# Patient Record
Sex: FEMALE | Race: WHITE | Employment: PART TIME | URBAN - METROPOLITAN AREA
[De-identification: names, ages, dates, MRNs, and addresses within clinical notes are randomized per-mention and may not be internally consistent; named-entity substitution may affect disease eponyms.]

---

## 2017-02-07 ENCOUNTER — OFFICE VISIT (OUTPATIENT)
Dept: OBGYN | Facility: CLINIC | Age: 52
End: 2017-02-07
Payer: COMMERCIAL

## 2017-02-07 VITALS
HEART RATE: 77 BPM | SYSTOLIC BLOOD PRESSURE: 140 MMHG | OXYGEN SATURATION: 98 % | WEIGHT: 158.2 LBS | BODY MASS INDEX: 23.98 KG/M2 | HEIGHT: 68 IN | DIASTOLIC BLOOD PRESSURE: 80 MMHG | TEMPERATURE: 98.3 F

## 2017-02-07 DIAGNOSIS — R87.810 CERVICAL HIGH RISK HPV (HUMAN PAPILLOMAVIRUS) TEST POSITIVE: Primary | ICD-10-CM

## 2017-02-07 DIAGNOSIS — Z98.890 STATUS POST COLPOSCOPY: ICD-10-CM

## 2017-02-07 PROCEDURE — 57454 BX/CURETT OF CERVIX W/SCOPE: CPT | Performed by: FAMILY MEDICINE

## 2017-02-07 PROCEDURE — 88305 TISSUE EXAM BY PATHOLOGIST: CPT | Performed by: FAMILY MEDICINE

## 2017-02-07 NOTE — NURSING NOTE
"Chief Complaint   Patient presents with     Colposcopy       Initial /90 mmHg  Pulse 77  Temp(Src) 98.3  F (36.8  C) (Oral)  Ht 5' 7.5\" (1.715 m)  Wt 158 lb 3.2 oz (71.759 kg)  BMI 24.40 kg/m2  SpO2 98%  LMP 05/01/2015 (Approximate)  Breastfeeding? No Estimated body mass index is 24.4 kg/(m^2) as calculated from the following:    Height as of this encounter: 5' 7.5\" (1.715 m).    Weight as of this encounter: 158 lb 3.2 oz (71.759 kg).  Medication Reconciliation: unable or not appropriate to perform   KRISTY Elizabeth      "

## 2017-02-07 NOTE — PROGRESS NOTES
Katharina Rodríguez is a 51 year old  woman who presents for colposcopy for other HR HPV.  She has a prior history of other HR HPV.    GYNECOLOGIC HISTORY   Menarche: 14  Contraception Use: none  Pap: NIL    This document serves as a record of the services and decisions personally performed and made by Samantha Medrano DO. It was created on his/her behalf by Nisa Azul, a trained medical scribe. The creation of this document is based the provider's statements to the medical scribe.  Scribe Nisa Azul 2:52 PM, 2017      Risks and benefits were explained in detail prior to procedure including bleeding, infection and possible need for further treatment.  Informed consent was obtained to proceed.    Procedure/Findings:  Patient was placed in dorsal lithotomy position. Speculum was inserted. Gross examination of the cervix revealed no path.  Acetic acid and Lugol's solution applied.    1. Colposcopy adequate:  Yes     2. Squamocolumnar junction visibility: completely visible    3. Normal colposcopic findings:      Original squamous epithelium:  mature    Other:  other: none    4. Abnormal colposcopic findings:  yes    Location of lesion:  inside transformation zone at 12, 4 and 9 o'clock    Size of lesion:  3 quadrants,  2% of cervix    Grade 1 (minor):  yes     Details:   fine mosaicism, fine punctation and thin acetowhite epithelium    Grade 2 (major):   no     Details:    none    Non-specific:         Leukoplakia: no        Erosion: no    Lugol's staining:  Non-stained    Suspicious for invasion: no     Details:  none    Miscellaneous findings: none    5.  3 biopsies were obtained at 12, 4, and 9 o'clock.     ECC was performed.     Hemostasis was achieved with monsels.        Procedure course: Patient tolerated procedure well  Assessment:  Normal exam without visible pathology, DEVYN 1, DEVYN 2 and DEVYN 3  Plan:   if no dysplasia, repeat pap in 6/12 months, if CIN1, recommend    Repeat pap in  6/12 months or HPV in 1 year and if CIN2-3     recommend LEEP, Cryo or Repeat pap/colposcopy in 6/12 months    The information in this document, created by the medical scribe for me, accurately reflects the services I personally performed and the decisions made by me. I have reviewed and approved this document for accuracy prior to leaving the patient care area.  Samantha Medrano DO  2:52 PM, 02/07/2017      Dr. Samantha Medrano, DO    OB/GYN   [facility]      2011 Colposcopic Terminology of the International Federation for Cervical Pathology and Colposcopy  Obstetrics and Gynecology, VOL. 120, NO. 1, JULY 2012

## 2017-02-07 NOTE — PATIENT INSTRUCTIONS
We will contact you with results.     Dr. Samantha Medrano, DO    Obstetrics and Gynecology  [facility] [place]

## 2017-02-09 LAB — COPATH REPORT: NORMAL

## 2017-06-13 ENCOUNTER — TELEPHONE (OUTPATIENT)
Dept: FAMILY MEDICINE | Facility: CLINIC | Age: 52
End: 2017-06-13

## 2017-06-13 DIAGNOSIS — N64.4 BREAST PAIN, LEFT: Primary | ICD-10-CM

## 2017-06-13 NOTE — TELEPHONE ENCOUNTER
Patient is calling for an order for a mammogram, she reports left breast pain as well.  This order is placed as a diagnostic mammogram, and patient is given phone number  To R radiology scheduling.   Marta Ramirez RN  Triage Nurse

## 2017-06-14 ENCOUNTER — HOSPITAL ENCOUNTER (OUTPATIENT)
Dept: ULTRASOUND IMAGING | Facility: CLINIC | Age: 52
End: 2017-06-14
Attending: INTERNAL MEDICINE
Payer: COMMERCIAL

## 2017-06-14 ENCOUNTER — HOSPITAL ENCOUNTER (OUTPATIENT)
Dept: MAMMOGRAPHY | Facility: CLINIC | Age: 52
Discharge: HOME | End: 2017-06-14
Attending: INTERNAL MEDICINE | Admitting: INTERNAL MEDICINE
Payer: COMMERCIAL

## 2017-06-14 DIAGNOSIS — N64.4 BREAST PAIN, LEFT: ICD-10-CM

## 2017-06-14 DIAGNOSIS — I10 ESSENTIAL HYPERTENSION WITH GOAL BLOOD PRESSURE LESS THAN 140/90: ICD-10-CM

## 2017-06-14 PROCEDURE — 76642 ULTRASOUND BREAST LIMITED: CPT | Mod: LT

## 2017-06-14 PROCEDURE — G0204 DX MAMMO INCL CAD BI: HCPCS

## 2017-06-15 NOTE — TELEPHONE ENCOUNTER
atenolol (TENORMIN) 25 MG      Last Written Prescription Date: 5/19/16  Last Fill Quantity: 90, # refills: 3    Last Office Visit with [facility], [facility] or [facility] prescribing provider:  11/9/16   Future Office Visit:        BP Readings from Last 3 Encounters:   02/07/17 140/80   11/09/16 128/70   09/12/16 118/72

## 2017-06-16 RX ORDER — ATENOLOL 25 MG/1
TABLET ORAL
Qty: 30 TABLET | Refills: 0 | Status: SHIPPED | OUTPATIENT
Start: 2017-06-16 | End: 2017-07-17

## 2017-06-16 NOTE — TELEPHONE ENCOUNTER
Medication is being filled for 1 time refill only due to:  Patient needs to be seen because it has been more than one year since last visit. LOV for this medication 5/19/2016    Esmer Harding RN

## 2017-07-17 DIAGNOSIS — I10 ESSENTIAL HYPERTENSION WITH GOAL BLOOD PRESSURE LESS THAN 140/90: ICD-10-CM

## 2017-07-18 NOTE — TELEPHONE ENCOUNTER
Routing refill request to provider for review/approval because:  Maria Fernanda given x1 and patient did not follow up, please advise    Giselle RODRIGUEZ RN, BSN, PHN  Lowry Flex RN

## 2017-07-19 RX ORDER — ATENOLOL 25 MG/1
25 TABLET ORAL DAILY
Qty: 14 TABLET | Refills: 0 | Status: SHIPPED | OUTPATIENT
Start: 2017-07-19 | End: 2017-08-03

## 2017-07-19 NOTE — TELEPHONE ENCOUNTER
Last addressed at 5/19/2016 appt. (OVER A YEAR AGO) and Maria Fernanda period meds provided without recommended follow up .  Please assist with scheduling pt.  Can provide #14 (2 weeks of medications) until appt.

## 2017-08-03 ENCOUNTER — OFFICE VISIT (OUTPATIENT)
Dept: FAMILY MEDICINE | Facility: CLINIC | Age: 52
End: 2017-08-03
Payer: COMMERCIAL

## 2017-08-03 VITALS
BODY MASS INDEX: 22.97 KG/M2 | OXYGEN SATURATION: 97 % | HEART RATE: 62 BPM | TEMPERATURE: 98 F | HEIGHT: 68 IN | WEIGHT: 151.6 LBS | DIASTOLIC BLOOD PRESSURE: 64 MMHG | RESPIRATION RATE: 16 BRPM | SYSTOLIC BLOOD PRESSURE: 128 MMHG

## 2017-08-03 DIAGNOSIS — I10 ESSENTIAL HYPERTENSION WITH GOAL BLOOD PRESSURE LESS THAN 140/90: ICD-10-CM

## 2017-08-03 DIAGNOSIS — Z11.59 ENCOUNTER FOR HCV SCREENING TEST FOR LOW RISK PATIENT: Primary | ICD-10-CM

## 2017-08-03 DIAGNOSIS — Z13.6 CARDIOVASCULAR SCREENING; LDL GOAL LESS THAN 130: ICD-10-CM

## 2017-08-03 PROCEDURE — 99214 OFFICE O/P EST MOD 30 MIN: CPT | Performed by: INTERNAL MEDICINE

## 2017-08-03 RX ORDER — ATENOLOL 25 MG/1
25 TABLET ORAL DAILY
Qty: 90 TABLET | Refills: 3 | Status: SHIPPED | OUTPATIENT
Start: 2017-08-03 | End: 2017-08-21

## 2017-08-03 NOTE — PROGRESS NOTES
SUBJECTIVE:                                                    Katharina Rodríguez is a 51 year old female who presents to clinic today for the following health issues:    Hypertension Follow-up    Outpatient blood pressures are not being checked.    Low Salt Diet: not monitoring salt    Patient is currently taking atenolol (Tenormin) 25 mg daily   BP Readings from Last 3 Encounters:   08/03/17 128/64   02/07/17 140/80   11/09/16 128/70     Cardiac risk assessment   The 10-year ASCVD risk score ([place] BURNS Jr, et al., 2013) is: 2.1%    Values used to calculate the score:      Age: 51 years      Sex: Female      Is Non- : No      Diabetic: No      Tobacco smoker: No      Systolic Blood Pressure: 128 mmHg      Is BP treated: Yes      HDL Cholesterol: 60 mg/dL      Total Cholesterol: 204 mg/dL   Lab Results   Component Value Date     04/09/2015       Amount of exercise or physical activity: 2-3 days/week for an average of 30-45 minutes    Problems taking medications regularly: No    Medication side effects: none    Diet: decreased dairy    Problem list and histories reviewed & adjusted, as indicated.  Additional history: as documented    BP Readings from Last 3 Encounters:   08/03/17 128/64   02/07/17 140/80   11/09/16 128/70    Wt Readings from Last 3 Encounters:   08/03/17 151 lb 9.6 oz (68.8 kg)   02/07/17 158 lb 3.2 oz (71.8 kg)   11/09/16 151 lb (68.5 kg)        Labs reviewed in EPIC    Reviewed and updated as needed this visit by clinical staffTobacco  Allergies  Meds  Problems  Med Hx  Surg Hx  Fam Hx  Soc Hx        Reviewed and updated as needed this visit by Provider  Allergies  Meds  Problems       ROS:  CONSTITUTIONAL: NEGATIVE for fever, chills, change in weight  RESP: NEGATIVE for significant cough or SOB  CV: HTN - taking atenolol; POSITIVE for intermittent PVCs; NEGATIVE for chest pain, palpitations or peripheral edema  GI: POSITIVE for dairy sensitivity;  "NEGATIVE for nausea, abdominal pain, heartburn, or change in bowel habits  : GYN exam up to date; no urinary concerns  MUSCULOSKELETAL: NEGATIVE for significant arthralgias or myalgia  Neuro: negative  ENDOCRINE: NEGATIVE for temperature intolerance, skin/hair changes    This document serves as a record of the services and decisions personally performed and made by Elenita Keane MD. It was created on her behalf by Nery Carlos, a trained medical scribe. The creation of this document is based on the provider's statements to the medical scribe.   Nery Carlos, 9:46 AM, August 3, 2017    OBJECTIVE:   /64  Pulse 62  Temp 98  F (36.7  C) (Oral)  Resp 16  Ht 5' 7.5\" (1.715 m)  Wt 151 lb 9.6 oz (68.8 kg)  LMP 05/01/2015 (Approximate)  SpO2 97%  BMI 23.39 kg/m2  Body mass index is 23.39 kg/(m^2).  GENERAL: healthy, alert and no distress  NECK: no carotid bruits  RESP: lungs clear to auscultation - no rales, rhonchi or wheezes  CV: regular rates and rhythm, normal S1 S2, peripheral pulses strong and no peripheral edema  ABDOMEN: soft, nontender, and bowel sounds normal  MS: extremities normal- no gross deformities noted  PSYCH: mentation appears normal and affect normal/bright      ASSESSMENT/PLAN:             (I10) Essential hypertension with goal blood pressure less than 140/90 (primary encounter diagnosis)  Comment: BP reviewed and well-controlled with low dose BB; will continue to monitor; no change in med/dosage   Plan: atenolol (TENORMIN) 25 MG tablet, Comprehensive        metabolic panel, Lipid panel reflex to direct         LDL          (Z13.6) CARDIOVASCULAR SCREENING; LDL GOAL LESS THAN 130  Comment: LDL at goal in the past, due for fasting labs; regular activity - walking, , massage therapist, \"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\"   Lab Results   Component Value Date     04/09/2015     07/18/2012   Plan: Lipid panel reflex to direct LDL          (Z11.59) Encounter for HCV screening " test for low risk patient   Comment: Hepatitis C screening recommended for adults born between 1945 and 1965  Plan: Hepatitis C antibody    MEDICATIONS:   Orders Placed This Encounter   Medications     atenolol (TENORMIN) 25 MG tablet     Sig: Take 1 tablet (25 mg) by mouth daily Overdue for appt to assess blood pressure     Dispense:  90 tablet     Refill:  3     Medications Discontinued During This Encounter   Medication Reason     atenolol (TENORMIN) 25 MG tablet Reorder     FUTURE LABS:       - Schedule a fasting blood draw next week; orders placed  FUTURE APPOINTMENTS:       - Follow-up visit in 1 year    Elenita Keane MD  Internal Medicine  [facility]    The information in this document, created by a medical scribe for me, accurately reflects the services I personally performed and the decisions made by me. I have reviewed and approved this document for accuracy.  Dr. Elenita Keane, 9:55 AM, August 3, 2017

## 2017-08-03 NOTE — MR AVS SNAPSHOT
After Visit Summary   8/3/2017    Katharina Rodríguez    MRN: [id number]           Patient Information     Date Of Birth          1965        Visit Information        Provider Department      8/3/2017 9:30 AM Elenita Keane MD [facility]        Today's Diagnoses     Encounter for HCV screening test for low risk patient    -  1    Essential hypertension with goal blood pressure less than 140/90        CARDIOVASCULAR SCREENING; LDL GOAL LESS THAN 130           Follow-ups after your visit        Your next 10 appointments already scheduled     Aug 07, 2017  9:00 AM CDT   LAB with  LAB   [facility]    [address] [facility] [address] [phone]           Patient must bring picture ID. Patient should be prepared to give a urine specimen  Please do not eat 10-12 hours before your appointment if you are coming in fasting for labs on lipids, cholesterol, or glucose (sugar). Pregnant women should follow their Care Team instructions. Water with medications is okay. Do not drink coffee or other fluids. If you have concerns about taking  your medications, please ask at office or if scheduling via [place], send a message by clicking on Secure Messaging, Message Your Care Team.              Future tests that were ordered for you today     Open Future Orders        Priority Expected Expires Ordered    Comprehensive metabolic panel Routine  10/3/2017 8/3/2017    Lipid panel reflex to direct LDL Routine  10/3/2017 8/3/2017    Hepatitis C antibody Routine  10/3/2017 8/3/2017            Who to contact     If you have questions or need follow up information about today's clinic visit or your schedule please contact [facility] directly at [phone].  Normal or non-critical lab and imaging results will be communicated to you by MyChart, letter or phone within 4 business days after the clinic has received the  "results. If you do not hear from us within 7 days, please contact the clinic through [employer] or phone. If you have a critical or abnormal lab result, we will notify you by phone as soon as possible.  Submit refill requests through [employer] or call your pharmacy and they will forward the refill request to us. Please allow 3 business days for your refill to be completed.          Additional Information About Your Visit        [employer] Information     [employer] gives you secure access to your electronic health record. If you see a primary care provider, you can also send messages to your care team and make appointments. If you have questions, please call your primary care clinic.  If you do not have a primary care provider, please call [phone] and they will assist you.        Care EveryWhere ID     This is your Care EveryWhere ID. This could be used by other organizations to access your [facility] medical records  [id number]        Your Vitals Were     Pulse Temperature Respirations Height Last Period Pulse Oximetry    62 98  F (36.7  C) (Oral) 16 5' 7.5\" (1.715 m) 05/01/2015 (Approximate) 97%    BMI (Body Mass Index)                   23.39 kg/m2            Blood Pressure from Last 3 Encounters:   08/03/17 128/64   02/07/17 140/80   11/09/16 128/70    Weight from Last 3 Encounters:   08/03/17 151 lb 9.6 oz (68.8 kg)   02/07/17 158 lb 3.2 oz (71.8 kg)   11/09/16 151 lb (68.5 kg)                 Where to get your medicines      These medications were sent to [address] [address] [facility]  [address] [facility] [address]    Hours:  Tech issues with their phone system Phone:  [phone]     atenolol 25 MG tablet          Primary Care Provider Office Phone # Fax #    Elenita Keane [phone]       [facility] [address] [facility] [address]        Equal Access to Services     NADIRA CHAMBERLAIN AH: Hadii pau [place], " harsha gagetahirjersey poecat mays shiraana parra. So [facility] [phone].    ATENCIÓN: Si habla ayush, tiene a angel disposición servicios gratuitos de asistencia lingüística. Regan al [phone].    We comply with applicable federal civil rights laws and [place] laws. We do not discriminate on the basis of race, color, national origin, age, disability sex, sexual orientation or gender identity.            Thank you!     Thank you for choosing [facility]  for your care. Our goal is always to provide you with excellent care. Hearing back from our patients is one way we can continue to improve our services. Please take a few minutes to complete the written survey that you may receive in the mail after your visit with us. Thank you!             Your Updated Medication List - Protect others around you: Learn how to safely use, store and throw away your medicines at www.disposemymeds.org.          This list is accurate as of: 8/3/17  9:55 AM.  Always use your most recent med list.                   Brand Name Dispense Instructions for use Diagnosis    atenolol 25 MG tablet    TENORMIN    90 tablet    Take 1 tablet (25 mg) by mouth daily Overdue for appt to assess blood pressure    Essential hypertension with goal blood pressure less than 140/90       MULTIVITAMIN PO      Take 1 capsule by mouth daily.

## 2017-08-03 NOTE — NURSING NOTE
"Chief Complaint   Patient presents with     Hypertension       Initial /64  Pulse 62  Temp 98  F (36.7  C) (Oral)  Resp 16  Ht 5' 7.5\" (1.715 m)  Wt 151 lb 9.6 oz (68.8 kg)  LMP 05/01/2015 (Approximate)  SpO2 97%  BMI 23.39 kg/m2 Estimated body mass index is 23.39 kg/(m^2) as calculated from the following:    Height as of this encounter: 5' 7.5\" (1.715 m).    Weight as of this encounter: 151 lb 9.6 oz (68.8 kg).  Medication Reconciliation: complete    "

## 2017-08-17 DIAGNOSIS — Z13.6 CARDIOVASCULAR SCREENING; LDL GOAL LESS THAN 130: ICD-10-CM

## 2017-08-17 DIAGNOSIS — Z11.59 ENCOUNTER FOR HCV SCREENING TEST FOR LOW RISK PATIENT: ICD-10-CM

## 2017-08-17 DIAGNOSIS — I10 ESSENTIAL HYPERTENSION WITH GOAL BLOOD PRESSURE LESS THAN 140/90: ICD-10-CM

## 2017-08-17 LAB
ALBUMIN SERPL-MCNC: 3.7 G/DL (ref 3.4–5)
ALP SERPL-CCNC: 77 U/L (ref 40–150)
ALT SERPL W P-5'-P-CCNC: 27 U/L (ref 0–50)
ANION GAP SERPL CALCULATED.3IONS-SCNC: 8 MMOL/L (ref 3–14)
AST SERPL W P-5'-P-CCNC: 20 U/L (ref 0–45)
BILIRUB SERPL-MCNC: 0.4 MG/DL (ref 0.2–1.3)
BUN SERPL-MCNC: 18 MG/DL (ref 7–30)
CALCIUM SERPL-MCNC: 9.1 MG/DL (ref 8.5–10.1)
CHLORIDE SERPL-SCNC: 106 MMOL/L (ref 94–109)
CHOLEST SERPL-MCNC: 195 MG/DL
CO2 SERPL-SCNC: 27 MMOL/L (ref 20–32)
CREAT SERPL-MCNC: 0.92 MG/DL (ref 0.52–1.04)
GFR SERPL CREATININE-BSD FRML MDRD: 64 ML/MIN/1.7M2
GLUCOSE SERPL-MCNC: 80 MG/DL (ref 70–99)
HDLC SERPL-MCNC: 71 MG/DL
LDLC SERPL CALC-MCNC: 108 MG/DL
NONHDLC SERPL-MCNC: 124 MG/DL
POTASSIUM SERPL-SCNC: 4.1 MMOL/L (ref 3.4–5.3)
PROT SERPL-MCNC: 7 G/DL (ref 6.8–8.8)
SODIUM SERPL-SCNC: 141 MMOL/L (ref 133–144)
TRIGL SERPL-MCNC: 80 MG/DL

## 2017-08-17 PROCEDURE — 36415 COLL VENOUS BLD VENIPUNCTURE: CPT | Performed by: INTERNAL MEDICINE

## 2017-08-17 PROCEDURE — 80061 LIPID PANEL: CPT | Performed by: INTERNAL MEDICINE

## 2017-08-17 PROCEDURE — 80053 COMPREHEN METABOLIC PANEL: CPT | Performed by: INTERNAL MEDICINE

## 2017-08-17 PROCEDURE — 86803 HEPATITIS C AB TEST: CPT | Performed by: INTERNAL MEDICINE

## 2017-08-18 LAB — HCV AB SERPL QL IA: NONREACTIVE

## 2017-08-21 ENCOUNTER — TELEPHONE (OUTPATIENT)
Dept: FAMILY MEDICINE | Facility: CLINIC | Age: 52
End: 2017-08-21

## 2017-08-21 DIAGNOSIS — I10 ESSENTIAL HYPERTENSION WITH GOAL BLOOD PRESSURE LESS THAN 140/90: ICD-10-CM

## 2017-08-21 RX ORDER — ATENOLOL 25 MG/1
25 TABLET ORAL DAILY
Qty: 14 TABLET | Refills: 0 | Status: SHIPPED | OUTPATIENT
Start: 2017-08-21 | End: 2018-01-10

## 2017-08-24 RX ORDER — ATENOLOL 25 MG/1
TABLET ORAL
Qty: 30 TABLET | Refills: 5 | Status: SHIPPED | OUTPATIENT
Start: 2017-08-24 | End: 2017-10-23

## 2017-08-24 NOTE — TELEPHONE ENCOUNTER
Atenolol:  Prescription approved per [facility] Refill Protocol.  Not sure if they will have supply due to backorder/shortage.  Marta Ramirez, RN  Triage Nurse

## 2017-10-23 DIAGNOSIS — I10 ESSENTIAL HYPERTENSION WITH GOAL BLOOD PRESSURE LESS THAN 140/90: ICD-10-CM

## 2017-10-23 NOTE — TELEPHONE ENCOUNTER
atenolol (TENORMIN) 25 MG tablet      Last Written Prescription Date: 8/24/17  Last Fill Quantity: 30, # refills: 5    Last Office Visit with [facility], [facility] or [facility] prescribing provider:  8/3/17   Future Office Visit:        BP Readings from Last 3 Encounters:   08/03/17 128/64   02/07/17 140/80   11/09/16 128/70

## 2017-10-25 RX ORDER — ATENOLOL 25 MG/1
25 TABLET ORAL DAILY
Qty: 90 TABLET | Refills: 1 | Status: SHIPPED | OUTPATIENT
Start: 2017-10-25 | End: 2018-01-10

## 2017-10-25 NOTE — TELEPHONE ENCOUNTER
This request is from a mail order pharmacy.   Sent 90 day refills.  Marta Ramirez, RN  Triage Nurse

## 2017-12-16 ENCOUNTER — HEALTH MAINTENANCE LETTER (OUTPATIENT)
Age: 52
End: 2017-12-16

## 2018-01-10 ENCOUNTER — OFFICE VISIT (OUTPATIENT)
Dept: FAMILY MEDICINE | Facility: CLINIC | Age: 53
End: 2018-01-10
Payer: COMMERCIAL

## 2018-01-10 VITALS
RESPIRATION RATE: 14 BRPM | SYSTOLIC BLOOD PRESSURE: 120 MMHG | HEIGHT: 68 IN | TEMPERATURE: 98.3 F | WEIGHT: 160 LBS | BODY MASS INDEX: 24.25 KG/M2 | DIASTOLIC BLOOD PRESSURE: 70 MMHG | HEART RATE: 86 BPM

## 2018-01-10 DIAGNOSIS — Z23 NEED FOR PROPHYLACTIC VACCINATION AND INOCULATION AGAINST INFLUENZA: ICD-10-CM

## 2018-01-10 DIAGNOSIS — Z00.00 ENCOUNTER FOR ROUTINE ADULT HEALTH EXAMINATION WITHOUT ABNORMAL FINDINGS: Primary | ICD-10-CM

## 2018-01-10 DIAGNOSIS — I10 BENIGN HYPERTENSION: ICD-10-CM

## 2018-01-10 DIAGNOSIS — M21.611 BUNION, RIGHT: ICD-10-CM

## 2018-01-10 DIAGNOSIS — R87.810 CERVICAL HIGH RISK HPV (HUMAN PAPILLOMAVIRUS) TEST POSITIVE: ICD-10-CM

## 2018-01-10 DIAGNOSIS — L98.9 SKIN LESION: ICD-10-CM

## 2018-01-10 PROCEDURE — 90686 IIV4 VACC NO PRSV 0.5 ML IM: CPT | Performed by: FAMILY MEDICINE

## 2018-01-10 PROCEDURE — 99396 PREV VISIT EST AGE 40-64: CPT | Mod: 25 | Performed by: FAMILY MEDICINE

## 2018-01-10 PROCEDURE — 90471 IMMUNIZATION ADMIN: CPT | Performed by: FAMILY MEDICINE

## 2018-01-10 PROCEDURE — 87624 HPV HI-RISK TYP POOLED RSLT: CPT | Performed by: FAMILY MEDICINE

## 2018-01-10 PROCEDURE — 88175 CYTOPATH C/V AUTO FLUID REDO: CPT | Performed by: FAMILY MEDICINE

## 2018-01-10 RX ORDER — ATENOLOL 25 MG/1
25 TABLET ORAL DAILY
Qty: 90 TABLET | Refills: 3 | Status: SHIPPED | OUTPATIENT
Start: 2018-01-10 | End: 2019-01-30

## 2018-01-10 NOTE — PROGRESS NOTES
SUBJECTIVE:   CC: Katharina Rodríguez is an 52 year old woman who presents for preventive health visit.     Physical   Annual:     Getting at least 3 servings of Calcium per day::  Yes    Bi-annual eye exam::  NO    Dental care twice a year::  Yes    Sleep apnea or symptoms of sleep apnea::  Excessive snoring    Diet::  Regular (no restrictions)    Taking medications regularly::  Yes    Medication side effects::  None    Additional concerns today::  No          Taking atenolol 25 mg for HTN, no side effects. Sometimes she feels that her BP may be up but doesn't check it at home.     Needs repeat co-test. [place] last year showed LSIL.      Today's PHQ-2 Score:   PHQ-2 ( 1999 Pfizer) 1/10/2018   Q1: Little interest or pleasure in doing things 0   Q2: Feeling down, depressed or hopeless 0   PHQ-2 Score 0   Q1: Little interest or pleasure in doing things Not at all   Q2: Feeling down, depressed or hopeless Not at all   PHQ-2 Score 0       Abuse: Current or Past(Physical, Sexual or Emotional)- No  Do you feel safe in your environment - Yes    Social History   Substance Use Topics     Smoking status: Never Smoker     Smokeless tobacco: Never Used     Alcohol use Yes      Comment: social      Alcohol Use 1/10/2018   If you drink alcohol, do you typically have greater than 3 drinks per day OR greater than 7 drinks per week?   No   No flowsheet data found.      Reviewed orders with patient.  Reviewed health maintenance and updated orders accordingly - Yes  Patient Active Problem List   Diagnosis     Benign hypertension     Osteoarthritis of thumb     Ganglion cyst     Cervical high risk HPV (human papillomavirus) test positive     Bunion, right     Past Surgical History:   Procedure Laterality Date     COLONOSCOPY N/A 11/18/2015    Procedure: COLONOSCOPY;  Surgeon: Kole Owen MD;  Location:  GI     NONE OF THE ABOVE CORE MEASURE DIAGNOSES         Social History   Substance Use Topics     Smoking status: Never  "Smoker     Smokeless tobacco: Never Used     Alcohol use Yes      Comment: social      Family History   Problem Relation Age of Onset     Hypertension Father      C.A.D. Father      HEART DISEASE Father      Psychotic Disorder Sister      bi polar     Neurologic Disorder Daughter      parkinsons             Patient under age 50, mutual decision reflected in health maintenance.        Pertinent mammograms are reviewed under the imaging tab.  History of abnormal Pap smear: YES - updated in Problem List and Health Maintenance accordingly    Reviewed and updated as needed this visit by clinical staffTobacco  Allergies  Meds  Med Hx  Surg Hx  Fam Hx  Soc Hx        Reviewed and updated as needed this visit by Provider          Review of Systems  C: NEGATIVE for fever, chills, change in weight  I: NEGATIVE for worrisome rashes, moles or lesions  E: NEGATIVE for vision changes or irritation  ENT: NEGATIVE for ear, mouth and throat problems  R: NEGATIVE for significant cough or SOB  B: NEGATIVE for masses, tenderness or discharge  CV: NEGATIVE for chest pain, palpitations or peripheral edema  GI: NEGATIVE for nausea, abdominal pain, heartburn, or change in bowel habits  : NEGATIVE for unusual urinary or vaginal symptoms. No vaginal bleeding.  M: NEGATIVE for significant arthralgias or myalgia  N: NEGATIVE for weakness, dizziness or paresthesias  P: NEGATIVE for changes in mood or affect      OBJECTIVE:   /70 (BP Location: Right arm, Patient Position: Chair, Cuff Size: Adult Regular)  Pulse 86  Temp 98.3  F (36.8  C) (Oral)  Resp 14  Ht 5' 7.5\" (1.715 m)  Wt 160 lb (72.6 kg)  LMP 05/01/2015 (Approximate)  BMI 24.69 kg/m2  Physical Exam  GENERAL APPEARANCE: healthy, alert and no distress  EYES: Eyes grossly normal to inspection, PERRL and conjunctivae and sclerae normal  HENT: ear canals and TM's normal, nose and mouth without ulcers or lesions, oropharynx clear and oral mucous membranes moist  NECK: no " adenopathy, no asymmetry, masses, or scars and thyroid normal to palpation  RESP: lungs clear to auscultation - no rales, rhonchi or wheezes  BREAST: normal without masses, tenderness or nipple discharge and no palpable axillary masses or adenopathy  CV: regular rate and rhythm, normal S1 S2, no S3 or S4, no murmur, click or rub, no peripheral edema and peripheral pulses strong  ABDOMEN: soft, nontender, no hepatosplenomegaly, no masses and bowel sounds normal   (female): normal female external genitalia, normal urethral meatus, vaginal mucosal atrophy noted, normal cervix, adnexae, and uterus without masses or abnormal discharge  MS: bunion bilateral, worse on right compared to left, no musculoskeletal defects are noted and gait is age appropriate without ataxia  SKIN: no suspicious lesions or rashes  NEURO: Normal strength and tone, sensory exam grossly normal, mentation intact and speech normal  PSYCH: mentation appears normal and affect normal/bright    ASSESSMENT/PLAN:     1. Encounter for routine adult health examination without abnormal findings  - *MA Screening Digital Bilateral; Future    2. Need for prophylactic vaccination and inoculation against influenza  - FLU VAC, SPLIT VIRUS IM > 3 YO (QUADRIVALENT) [03780]  - Vaccine Administration, Initial [05146]    3. Cervical high risk HPV (human papillomavirus) test positive - cotest today  - Pap imaged thin layer diagnostic with HPV (select HPV order below)  - HPV High Risk Types DNA Cervical    4. Benign hypertension - stable, refills, although advised she check BP when she feels it may be high to further assess.   - atenolol (TENORMIN) 25 MG tablet; Take 1 tablet (25 mg) by mouth daily  Dispense: 90 tablet; Refill: 3    5. Skin lesion - suggested derm consult for skin check  - DERMATOLOGY REFERRAL    6. Bunion, right - discussed men's shoes with larger toe box to help with bunion, at this point, she would not consider surgery      COUNSELING:  Reviewed  "preventive health counseling, as reflected in patient instructions     reports that she has never smoked. She has never used smokeless tobacco.    Estimated body mass index is 24.69 kg/(m^2) as calculated from the following:    Height as of this encounter: 5' 7.5\" (1.715 m).    Weight as of this encounter: 160 lb (72.6 kg).     Muna Martinez MD  [facility]    Answers for HPI/ROS submitted by the patient on 1/10/2018   PHQ-2 Score: 0    Injectable Influenza Immunization Documentation    1.  Is the person to be vaccinated sick today?   No    2. Does the person to be vaccinated have an allergy to a component   of the vaccine?   No  Egg Allergy Algorithm Link    3. Has the person to be vaccinated ever had a serious reaction   to influenza vaccine in the past?   No    4. Has the person to be vaccinated ever had Guillain-Barré syndrome?   No    Form completed by pt         "

## 2018-01-10 NOTE — NURSING NOTE
"Chief Complaint   Patient presents with     Physical     Flu Shot       Initial /70 (BP Location: Right arm, Patient Position: Chair, Cuff Size: Adult Regular)  Pulse 86  Temp 98.3  F (36.8  C) (Oral)  Resp 14  Ht 5' 7.5\" (1.715 m)  Wt 160 lb (72.6 kg)  LMP 05/01/2015 (Approximate)  BMI 24.69 kg/m2 Estimated body mass index is 24.69 kg/(m^2) as calculated from the following:    Height as of this encounter: 5' 7.5\" (1.715 m).    Weight as of this encounter: 160 lb (72.6 kg).  Medication Reconciliation: complete      Health Maintenance addressed:  Pap ,flu    Will complete both today  .Albertina MORALES MA  .      "

## 2018-01-10 NOTE — MR AVS SNAPSHOT
After Visit Summary   1/10/2018    Katharina Rodríguez    MRN: [id number]           Patient Information     Date Of Birth          1965        Visit Information        Provider Department      1/10/2018 1:00 PM Muna Martinez MD [facility]        Today's Diagnoses     Encounter for routine adult health examination without abnormal findings    -  1    Need for prophylactic vaccination and inoculation against influenza        Essential hypertension with goal blood pressure less than 140/90        Cervical high risk HPV (human papillomavirus) test positive        Skin lesion          Care Instructions      Preventive Health Recommendations  Female Ages 50 - 64    Yearly exam: See your health care provider every year in order to  o Review health changes.   o Discuss preventive care.    o Review your medicines if your doctor has prescribed any.      Get a Pap test every three years (unless you have an abnormal result and your provider advises testing more often).    If you get Pap tests with HPV test, you only need to test every 5 years, unless you have an abnormal result.     You do not need a Pap test if your uterus was removed (hysterectomy) and you have not had cancer.    You should be tested each year for STDs (sexually transmitted diseases) if you're at risk.     Have a mammogram every 1 to 2 years.    Have a colonoscopy at age 50, or have a yearly FIT test (stool test). These exams screen for colon cancer.      Have a cholesterol test every 5 years, or more often if advised.    Have a diabetes test (fasting glucose) every three years. If you are at risk for diabetes, you should have this test more often.     If you are at risk for osteoporosis (brittle bone disease), think about having a bone density scan (DEXA).    Shots: Get a flu shot each year. Get a tetanus shot every 10 years.    Nutrition:     Eat at least 5 servings of fruits and vegetables each day.    Eat  whole-grain bread, whole-wheat pasta and brown rice instead of white grains and rice.    Talk to your provider about Calcium and Vitamin D.     Lifestyle    Exercise at least 150 minutes a week (30 minutes a day, 5 days a week). This will help you control your weight and prevent disease.    Limit alcohol to one drink per day.    No smoking.     Wear sunscreen to prevent skin cancer.     See your dentist every six months for an exam and cleaning.    See your eye doctor every 1 to 2 years.            Follow-ups after your visit        Additional Services     DERMATOLOGY REFERRAL       Your provider has referred you to: [facility] [phone]   [email or web address]    Please be aware that coverage of these services is subject to the terms and limitations of your health insurance plan.  Call member services at your health plan with any benefit or coverage questions.      Please bring the following with you to your appointment:    (1) Any X-Rays, CTs or MRIs which have been performed.  Contact the facility where they were done to arrange for  prior to your scheduled appointment.    (2) List of current medications  (3) This referral request   (4) Any documents/labs given to you for this referral                  Who to contact     If you have questions or need follow up information about today's clinic visit or your schedule please contact [facility] directly at [phone].  Normal or non-critical lab and imaging results will be communicated to you by MyChart, letter or phone within 4 business days after the clinic has received the results. If you do not hear from us within 7 days, please contact the clinic through MyChart or phone. If you have a critical or abnormal lab result, we will notify you by phone as soon as possible.  Submit refill requests through [employer] or call your pharmacy and they will forward the refill request to us. Please allow 3  "business days for your refill to be completed.          Additional Information About Your Visit        MyChart Information     [employer] gives you secure access to your electronic health record. If you see a primary care provider, you can also send messages to your care team and make appointments. If you have questions, please call your primary care clinic.  If you do not have a primary care provider, please call [phone] and they will assist you.        Care EveryWhere ID     This is your Care EveryWhere ID. This could be used by other organizations to access your [place] medical records  [id number]        Your Vitals Were     Pulse Temperature Respirations Height Last Period BMI (Body Mass Index)    86 98.3  F (36.8  C) (Oral) 14 5' 7.5\" (1.715 m) 05/01/2015 (Approximate) 24.69 kg/m2       Blood Pressure from Last 3 Encounters:   01/10/18 120/70   08/03/17 128/64   02/07/17 140/80    Weight from Last 3 Encounters:   01/10/18 160 lb (72.6 kg)   08/03/17 151 lb 9.6 oz (68.8 kg)   02/07/17 158 lb 3.2 oz (71.8 kg)              We Performed the Following     DERMATOLOGY REFERRAL     FLU VAC, SPLIT VIRUS IM > 3 YO (QUADRIVALENT) [94179]     HPV High Risk Types DNA Cervical     Pap imaged thin layer diagnostic with HPV (select HPV order below)     Vaccine Administration, Initial [03649]          Where to get your medicines      [facility] [place] [place] - [address] AT Portal to Registered [facility] Sites  [address], [facility] [address]     Phone:  [phone]     atenolol 25 MG tablet          Primary Care Provider Office Phone # Fax #    Elenita Keane [phone]       [address]  [facility] [address]        Equal Access to Services     JOEL CHAMBERLAIN : Elizabeth Issa, pau luqfrank, qaybta kacat wheat. [facility] [phone].    ATENCIÓN: Si katia " español, tiene a angel disposición servicios gratuitos de asistencia lingüística. Regan de anda [phone].    We comply with applicable federal civil rights laws and [place] laws. We do not discriminate on the basis of race, color, national origin, age, disability, sex, sexual orientation, or gender identity.            Thank you!     Thank you for choosing [facility]  for your care. Our goal is always to provide you with excellent care. Hearing back from our patients is one way we can continue to improve our services. Please take a few minutes to complete the written survey that you may receive in the mail after your visit with us. Thank you!             Your Updated Medication List - Protect others around you: Learn how to safely use, store and throw away your medicines at www.disposemymeds.org.          This list is accurate as of: 1/10/18  1:32 PM.  Always use your most recent med list.                   Brand Name Dispense Instructions for use Diagnosis    atenolol 25 MG tablet    TENORMIN    90 tablet    Take 1 tablet (25 mg) by mouth daily    Essential hypertension with goal blood pressure less than 140/90       MULTIVITAMIN PO      Take 1 capsule by mouth daily.

## 2018-01-15 LAB
COPATH REPORT: NORMAL
PAP: NORMAL

## 2018-01-17 LAB
FINAL DIAGNOSIS: NORMAL
HPV HR 12 DNA CVX QL NAA+PROBE: NEGATIVE
HPV16 DNA SPEC QL NAA+PROBE: NEGATIVE
HPV18 DNA SPEC QL NAA+PROBE: NEGATIVE
SPECIMEN DESCRIPTION: NORMAL
SPECIMEN SOURCE CVX/VAG CYTO: NORMAL

## 2018-08-15 NOTE — TELEPHONE ENCOUNTER
After Visit Summary   8/15/2018    Eden Yanez    MRN: [id number]           Patient Information     Date Of Birth          1991        Visit Information        Provider Department      8/15/2018 11:45 AM Miryam Perez MD [facility]        Today's Diagnoses     Preop general physical exam    -  1      Care Instructions      Before Your Surgery      Call your surgeon if there is any change in your health. This includes signs of a cold or flu (such as a sore throat, runny nose, cough, rash or fever).    Do not smoke, drink alcohol or take over the counter medicine (unless your surgeon or primary care doctor tells you to) for the 24 hours before and after surgery.    If you take prescribed drugs: Follow your doctor s orders about which medicines to take and which to stop until after surgery.    Eating and drinking prior to surgery: follow the instructions from your surgeon    Take a shower or bath the night before surgery. Use the soap your surgeon gave you to gently clean your skin. If you do not have soap from your surgeon, use your regular soap. Do not shave or scrub the surgery site.  Wear clean pajamas and have clean sheets on your bed.           Follow-ups after your visit        Your next 10 appointments already scheduled     Aug 19, 2018 11:00 AM CDT   LAB with NL LAB [facility] ([facility])    [address] [place] [address] [phone]           Please do not eat 10-12 hours before your appointment if you are coming in fasting for labs on lipids, cholesterol, or glucose (sugar). This does not apply to pregnant women. Water, hot tea and black coffee (with nothing added) are okay. Do not drink other fluids, diet soda or chew gum.            Aug 20, 2018   Procedure with Raheem Batista MD   [facility]    [address] [place] [address]  [place]  atenolol (TENORMIN) 25 MG      Last Written Prescription Date: 6/16/17  Last Fill Quantity: 30, # refills: 0    Last Office Visit with [facility], [facility] or [facility] prescribing provider:  11/9/16   Future Office Visit:        BP Readings from Last 3 Encounters:   02/07/17 140/80   11/09/16 128/70   09/12/16 118/72        [id number]   [phone]           From [address]: Exit at [address] on south side of [place]. Turn right on [address]. Turn left at stoplight on [address]. [facility] will be in view two blocks ahead              Who to contact     If you have questions or need follow up information about today's clinic visit or your schedule please contact [facility] directly at [phone].  Normal or non-critical lab and imaging results will be communicated to you by [employer], letter or phone within 4 business days after the clinic has received the results. If you do not hear from us within 7 days, please contact the clinic through [facility] or phone. If you have a critical or abnormal lab result, we will notify you by phone as soon as possible.  Submit refill requests through [facility] or call your pharmacy and they will forward the refill request to us. Please allow 3 business days for your refill to be completed.          Additional Information About Your Visit        [facility] Information     [facility] gives you secure access to your electronic health record. If you see a primary care provider, you can also send messages to your care team and make appointments. If you have questions, please call your primary care clinic.  If you do not have a primary care provider, please call [phone] and they will assist you.        Care EveryWhere ID     This is your Care EveryWhere ID. This could be used by other organizations to access your [place] medical records  [id number]        Your Vitals Were     Pulse Temperature Last Period Pulse Oximetry BMI (Body Mass Index)       115 97.2  F (36.2  C) (Temporal) 11/18/2017 (Approximate) 98% 28.7 kg/m2        Blood Pressure from Last 3 Encounters:   08/15/18 (!) 88/52   08/15/18 (!) 88/52   08/10/18 102/64    Weight from Last 3 Encounters:   08/15/18 167 lb 3.2 oz (75.8 kg)   08/15/18 167 lb 3.2 oz (75.8 kg)   08/10/18 168 lb (76.2 kg)               Today, you had the following     No orders found for display       Primary Care Provider Office Phone # Fax #    Miryam Anay Perez [phone]       [address] [place] [address]        Equal Access to Services     YADI PATEL : Hadmichelle monsalve hadrosalvao Soomaali, waaxda luqadaha, qaybta kaalmada adeegyada, jocelynn pedroin hayaan dennysdavide esteves shilpa fernández. So [facility] [phone].    ATENCIÓN: Si habla español, tiene a funez disposición servicios gratuitos de asistencia lingüística. Llame al [phone].    We comply with applicable federal civil rights laws and [place] laws. We do not discriminate on the basis of race, color, national origin, age, disability, sex, sexual orientation, or gender identity.            Thank you!     Thank you for choosing [facility]  for your care. Our goal is always to provide you with excellent care. Hearing back from our patients is one way we can continue to improve our services. Please take a few minutes to complete the written survey that you may receive in the mail after your visit with us. Thank you!             Your Updated Medication List - Protect others around you: Learn how to safely use, store and throw away your medicines at www.disposemymeds.org.          This list is accurate as of 8/15/18 12:36 PM.  Always use your most recent med list.                   Brand Name Dispense Instructions for use Diagnosis    ferrous sulfate 325 (65 Fe) MG tablet    IRON    90 tablet    Take 1 tablet (325 mg) by mouth daily (with breakfast)    Iron deficiency anemia, unspecified iron deficiency anemia type       loratadine 10 MG tablet    CLARITIN     Take 10 mg by mouth daily        OMEGA DHA PO           prenatal multivitamin plus iron 27-0.8 MG Tabs per tablet      Take 1 tablet by mouth daily

## 2019-01-29 DIAGNOSIS — I10 BENIGN HYPERTENSION: ICD-10-CM

## 2019-01-29 NOTE — TELEPHONE ENCOUNTER
"Requested Prescriptions   Pending Prescriptions Disp Refills     atenolol (TENORMIN) 25 MG tablet    Last Written Prescription Date:  1/10/2018  Last Fill Quantity: 90,  # refills: 3   Last office visit: 1/10/2018 with prescribing provider:  Elenita Keane     Future Office Visit:   Next 5 appointments (look out 90 days)    Feb 25, 2019  8:30 AM CST  Office Visit with Elenita Keane MD  [address] [address] [phone]          90 tablet 3     Sig: Take 1 tablet (25 mg) by mouth daily    Beta-Blockers Protocol Failed - 1/29/2019  2:44 PM       Failed - Blood pressure under 140/90 in past 12 months    BP Readings from Last 3 Encounters:   01/10/18 120/70   08/03/17 128/64   02/07/17 140/80                Passed - Patient is age 6 or older       Passed - Recent (12 mo) or future (30 days) visit within the authorizing provider's specialty    Patient had office visit in the last 12 months or has a visit in the next 30 days with authorizing provider or within the authorizing provider's specialty.  See \"Patient Info\" tab in inbasket, or \"Choose Columns\" in Meds & Orders section of the refill encounter.             Passed - Medication is active on med list              "

## 2019-01-30 RX ORDER — ATENOLOL 25 MG/1
25 TABLET ORAL DAILY
Qty: 30 TABLET | Refills: 0 | Status: SHIPPED | OUTPATIENT
Start: 2019-01-30 | End: 2019-02-25

## 2019-01-30 NOTE — TELEPHONE ENCOUNTER
30 day refill given with scheduled appointment  Joanna MCNEILL RN - Triage  [facility]

## 2019-02-15 NOTE — TELEPHONE ENCOUNTER
Called in prescription atenolol 25 mg to [facility] pharmacy.    Attempted to leave message, but mailbox full.    Esmer Harding RN

## 2019-02-25 ENCOUNTER — OFFICE VISIT (OUTPATIENT)
Dept: FAMILY MEDICINE | Facility: CLINIC | Age: 54
End: 2019-02-25
Payer: COMMERCIAL

## 2019-02-25 VITALS
SYSTOLIC BLOOD PRESSURE: 130 MMHG | BODY MASS INDEX: 26.32 KG/M2 | OXYGEN SATURATION: 100 % | WEIGHT: 167.7 LBS | RESPIRATION RATE: 16 BRPM | HEART RATE: 65 BPM | HEIGHT: 67 IN | TEMPERATURE: 97.7 F | DIASTOLIC BLOOD PRESSURE: 74 MMHG

## 2019-02-25 DIAGNOSIS — I10 BENIGN HYPERTENSION: ICD-10-CM

## 2019-02-25 DIAGNOSIS — R06.83 SNORING: Primary | ICD-10-CM

## 2019-02-25 DIAGNOSIS — M21.611 BUNION, RIGHT: ICD-10-CM

## 2019-02-25 PROCEDURE — 99214 OFFICE O/P EST MOD 30 MIN: CPT | Performed by: INTERNAL MEDICINE

## 2019-02-25 RX ORDER — ATENOLOL 25 MG/1
25 TABLET ORAL DAILY
Qty: 90 TABLET | Refills: 3 | Status: SHIPPED | OUTPATIENT
Start: 2019-02-25 | End: 2020-02-13

## 2019-02-25 ASSESSMENT — MIFFLIN-ST. JEOR: SCORE: 1391.48

## 2019-02-25 NOTE — PROGRESS NOTES
Last saw this provider 8/3/2017  SUBJECTIVE:   Katharina Rodríguez is a 53 year old female who presents to clinic today for the following health issues:      Hypertension Follow-up      Outpatient blood pressures are not being checked.    Low Salt Diet: no added salt      Amount of exercise or physical activity: is active but no specific exercise program    Problems taking medications regularly: No    Medication side effects: none    Diet: regular (no restrictions)      Musculoskeletal problem/pain  (would like referral to podiatry has bunions and wonders if that may be affecting her gait)       Duration: about 6 months    Description  Location: right ankle    Intensity:  moderate    Accompanying signs and symptoms: none    History  Previous similar problem: no   Previous evaluation:  none    Precipitating or alleviating factors:  Trauma or overuse: no   Aggravating factors include: standing, walking and exercise    Therapies tried and outcome: rest/inactivity, heat, ice and stretching    Snoring and sleep concerns   (would like referral for sleep apnea testing)      Duration: about a year or more    Description (location/character/radiation): wakeful and wakes herself at times when snoring, startles awake, mouth breathing    Intensity:  moderate    Accompanying signs and symptoms: dry mouth,     History (similar episodes/previous evaluation): None    Precipitating or alleviating factors: None    Therapies tried and outcome: None       Problem list and histories reviewed & adjusted, as indicated.  Additional history: as documented    Patient Active Problem List   Diagnosis     Benign hypertension     Osteoarthritis of thumb     Ganglion cyst     Cervical high risk HPV (human papillomavirus) test positive     Bunion, right     Past Surgical History:   Procedure Laterality Date     COLONOSCOPY N/A 11/18/2015    Procedure: COLONOSCOPY;  Surgeon: Kole Oewn MD;  Location:  GI     NONE OF THE ABOVE CORE  MEASURE DIAGNOSES         Social History     Tobacco Use     Smoking status: Never Smoker     Smokeless tobacco: Never Used   Substance Use Topics     Alcohol use: Yes     Comment: social      Family History   Problem Relation Age of Onset     Hypertension Father      C.A.D. Father      Heart Disease Father      Psychotic Disorder Sister         bi polar     Neurologic Disorder Daughter         parkinsons         Current Outpatient Medications   Medication Sig Dispense Refill     atenolol (TENORMIN) 25 MG tablet Take 1 tablet (25 mg) by mouth daily 90 tablet 3     Multiple Vitamin (MULTIVITAMIN OR) Take 1 capsule by mouth daily.       dexamethasone (DECADRON) 4 MG/ML injection Apply 1 mL (4 mg) topically once for 1 dose For physical therapy, iontophoresis 30 mL 0     order for DME Equipment being ordered: tall aircast boot 1 Device 0     No Known Allergies  BP Readings from Last 3 Encounters:   02/27/19 118/72   02/25/19 130/74   01/10/18 120/70    Wt Readings from Last 3 Encounters:   02/27/19 75.8 kg (167 lb)   02/25/19 76.1 kg (167 lb 11.2 oz)   01/10/18 72.6 kg (160 lb)                  Labs reviewed in EPIC    Reviewed and updated as needed this visit by clinical staff  Tobacco  Allergies  Meds  Problems  Med Hx  Surg Hx  Fam Hx  Soc Hx        Reviewed and updated as needed this visit by Provider  Tobacco  Allergies  Meds  Problems  Med Hx  Surg Hx  Fam Hx         ROS:  CONSTITUTIONAL: NEGATIVE for fever, chills, change in weight  ENT/MOUTH: NEGATIVE for ear, mouth and throat problems; snoring reproted  RESP: NEGATIVE for significant cough or SOB  CV: NEGATIVE for chest pain, palpitations or peripheral edema  MUSCULOSKELETAL: right bunion bothersome;  NEURO: NEGATIVE for weakness, dizziness or paresthesias  ENDOCRINE: HTN, cardiac risk assessment - low risk <3%  PSYCHIATRIC: NEGATIVE for changes in mood or affect    OBJECTIVE:     /74   Pulse 65   Temp 97.7  F (36.5  C) (Oral)   Resp 16   " Ht 1.707 m (5' 7.2\")   Wt 76.1 kg (167 lb 11.2 oz)   LMP 05/01/2015 (Approximate)   SpO2 100%   BMI 26.11 kg/m    Body mass index is 26.11 kg/m .  GENERAL: healthy, alert and no distress  HENT: ear canals and TM's normal, nose and mouth without ulcers or lesions  NECK: no adenopathy, no asymmetry, masses, or scars and thyroid normal to palpation  RESP: lungs clear to auscultation - no rales, rhonchi or wheezes  CV: regular rates and rhythm, normal S1 S2, no S3 or S4, peripheral pulses strong and no peripheral edema  ABDOMEN: soft, nontender, no hepatosplenomegaly, no masses and bowel sounds normal  MS: right  Bunion noted; ambulatory  NEURO: Normal strength and tone, mentation intact and speech normal  PSYCH: mentation appears normal, affect normal/bright      ASSESSMENT/PLAN:     (R06.83) Snoring  (primary encounter diagnosis)  Comment: concerned about sleep apnea; BMI 26.11; reviewed healthy weight- OK  Snoring can be from nasal congestion ,   Plan: SLEEP EVALUATION & MANAGEMENT REFERRAL - ADULT         -[facility]         [phone]  (Age 18 and up)            (I10) Benign hypertension  Comment: bp well controlled; mediation well tolerated.   Plan: atenolol (TENORMIN) 25 MG tablet          (M21.611) Bunion, right  Comment: skin intact; reviewed importance of supportive shoes; arch support; she desires podiatry evaluation  Plan: PODIATRY/FOOT & ANKLE SURGERY REFERRAL            Elenita Keane MD  Internal Medicine   [facility]  "

## 2019-02-27 ENCOUNTER — OFFICE VISIT (OUTPATIENT)
Dept: PODIATRY | Facility: CLINIC | Age: 54
End: 2019-02-27
Payer: COMMERCIAL

## 2019-02-27 VITALS
SYSTOLIC BLOOD PRESSURE: 118 MMHG | BODY MASS INDEX: 26.21 KG/M2 | WEIGHT: 167 LBS | HEIGHT: 67 IN | DIASTOLIC BLOOD PRESSURE: 72 MMHG

## 2019-02-27 DIAGNOSIS — M76.821 POSTERIOR TIBIAL TENDONITIS, RIGHT: ICD-10-CM

## 2019-02-27 DIAGNOSIS — R29.898 WEAKNESS OF RIGHT FOOT: ICD-10-CM

## 2019-02-27 DIAGNOSIS — M79.671 RIGHT FOOT PAIN: Primary | ICD-10-CM

## 2019-02-27 PROCEDURE — 99243 OFF/OP CNSLTJ NEW/EST LOW 30: CPT | Performed by: PODIATRIST

## 2019-02-27 RX ORDER — DEXAMETHASONE SODIUM PHOSPHATE 4 MG/ML
4 INJECTION, SOLUTION INTRA-ARTICULAR; INTRALESIONAL; INTRAMUSCULAR; INTRAVENOUS; SOFT TISSUE ONCE
Qty: 30 ML | Refills: 0 | Status: SHIPPED | OUTPATIENT
Start: 2019-02-27 | End: 2020-02-12

## 2019-02-27 ASSESSMENT — MIFFLIN-ST. JEOR: SCORE: 1388.31

## 2019-02-27 NOTE — PROGRESS NOTES
PATIENT HISTORY:  Dr. Keane requested I see this patient for their foot issue.  Katharina Rodríguez is a 53 year old female who presents to clinic for right ankle pain. Notes that it has been going on for a few months. Started after she had a big move and was carrying a lot of heavy boxes. Has gotten a little better but pain is still 6/10. Has tried icing and new shoes. Has worn inserts for years. She is on her feet a lot at her job. More pain the longer she is on her foot.     Review of Systems:  Patient denies fever, chills, rash, wound, stiffness, numbness, weakness, heart burn, blood in stool, chest pain with activity, calf pain when walking, shortness of breath with activity, chronic cough, easy bleeding/bruising, swelling of ankles, excessive thirst, fatigue, depression, anxiety.  Patient admits to limping at times.     PAST MEDICAL HISTORY:   Past Medical History:   Diagnosis Date     Cervical high risk HPV (human papillomavirus) test positive 11/2015,11/09/16    + HPV (not 16 or 18)     History of colposcopy with cervical biopsy 02/07/2017 02/07/17: [place] Bx's LSIL DEVYN 1.      Hypertension         PAST SURGICAL HISTORY:   Past Surgical History:   Procedure Laterality Date     COLONOSCOPY N/A 11/18/2015    Procedure: COLONOSCOPY;  Surgeon: Kole Owen MD;  Location: [facility]     NONE OF THE ABOVE CORE MEASURE DIAGNOSES          MEDICATIONS:   Current Outpatient Medications:      atenolol (TENORMIN) 25 MG tablet, Take 1 tablet (25 mg) by mouth daily, Disp: 90 tablet, Rfl: 3     Multiple Vitamin (MULTIVITAMIN OR), Take 1 capsule by mouth daily., Disp: , Rfl:      ALLERGIES:  No Known Allergies     SOCIAL HISTORY:   Social History     Socioeconomic History     Marital status:      Spouse name: Not on file     Number of children: Not on file     Years of education: Not on file     Highest education level: Not on file   Occupational History     Not on file   Social Needs     Financial resource  "strain: Not on file     Food insecurity:     Worry: Not on file     Inability: Not on file     Transportation needs:     Medical: Not on file     Non-medical: Not on file   Tobacco Use     Smoking status: Never Smoker     Smokeless tobacco: Never Used   Substance and Sexual Activity     Alcohol use: Yes     Comment: social      Drug use: No     Sexual activity: Yes     Partners: Male   Lifestyle     Physical activity:     Days per week: Not on file     Minutes per session: Not on file     Stress: Not on file   Relationships     Social connections:     Talks on phone: Not on file     Gets together: Not on file     Attends Restoration service: Not on file     Active member of club or organization: Not on file     Attends meetings of clubs or organizations: Not on file     Relationship status: Not on file     Intimate partner violence:     Fear of current or ex partner: Not on file     Emotionally abused: Not on file     Physically abused: Not on file     Forced sexual activity: Not on file   Other Topics Concern     Parent/sibling w/ CABG, MI or angioplasty before 65F 55M? No   Social History Narrative     Not on file        FAMILY HISTORY:   Family History   Problem Relation Age of Onset     Hypertension Father      C.A.D. Father      Heart Disease Father      Psychotic Disorder Sister         bi polar     Neurologic Disorder Daughter         parkinsons        EXAM:Vitals: /72   Ht 1.707 m (5' 7.2\")   Wt 75.8 kg (167 lb)   LMP 05/01/2015 (Approximate)   BMI 26.00 kg/m    BMI= Body mass index is 26 kg/m .    General appearance: Patient is alert and fully cooperative with history & exam.  No sign of distress is noted during the visit.     Psychiatric: Affect is pleasant & appropriate.  Patient appears motivated to improve health.     Respiratory: Breathing is regular & unlabored while sitting.     HEENT: Hearing is intact to spoken word.  Speech is clear.  No gross evidence of visual impairment that would " impact ambulation.     Dermatologic: Skin is intact to both lower extremities without significant lesions, rash or abrasion.  No paronychia or evidence of soft tissue infection is noted.     Vascular: DP & PT pulses are intact & regular bilaterally.  No significant edema or varicosities noted.  CFT and skin temperature is normal to both lower extremities.     Neurologic: Lower extremity sensation is intact to light touch.  No evidence of weakness or contracture in the lower extremities.  No evidence of neuropathy.     Musculoskeletal: Patient is ambulatory without assistive device or brace.  Pain on palpation of the right posterior tibial tendon insertion. No pain with eversion or inversion on exam but some weakness on inversion noted.      ASSESSMENT:    Right foot pain  Posterior tibial tendonitis, right  Weakness of right foot     PLAN:  Reviewed patient's chart in [facility]. Reviewed and discussed causes of tendonitis.  We discussed treatments such as immobiliation, icing, stretching, heel lifts, orthotics, physical therapy, MRI.     At this time, recommend immobilization in boot for next month. Recommend compression, warm foot soaks. She will follow up in 1 month. If better, transition to shoes and inserts. If not, recommend MRI.        Kamilla Rios DPM, Podiatry/Foot and Ankle Surgery    Weight management plan: Patient was referred to their PCP to discuss a diet and exercise plan.

## 2019-02-27 NOTE — PATIENT INSTRUCTIONS
Thank you for choosing [facility]!    DR. BENNETT'S CLINIC SCHEDULE  MONDAY AM - [place] TUESDAY - [place]   [address] [address] [place] [address]   [phone] / [phone] / [phone]       WEDNESDAY - [place] FRIDAY AM - [facility]   [address]   [place] [address] [address] [address] [address] [address]   [phone] / [phone]       FRIDAY PM - [place] SCHEDULE SURGERY: [phone]   [address] BILLING QUESTIONS: [phone]   [address] [address] AFTER HOURS: [phone]   [phone] / [phone] APPOINTMENTS: [phone]     Consumer Price Line (CPL) [phone]       One month follow up      Body Mass Index (BMI)  Many things can cause foot and ankle problems. Foot structure, activity level, foot mechanics and injuries are common causes of pain.  One very important issue that often goes unmentioned, is body weight. Extra weight can cause increased stress on muscles, ligaments, bones and tendons.  Sometimes just a few extra pounds is all it takes to put one over her/his threshold. Without reducing that stress, it can be difficult to alleviate pain. Some people are uncomfortable addressing this issue, but we feel it is important for you to think about it. As Foot &  Ankle specialists, our job is addressing the lower extremity problem and possible causes. Regarding extra body weight, we encourage patients to discuss diet and weight management plans with their primary care doctors. It is this team approach that gives you the best opportunity for pain relief and getting you back on your feet.            TENDONITIS   Tendons are the strong fibrous portions ofmuscles that attach to bones and allow the muscle to move a joint when it contracts. Tendons are very strong because they have a lot of force exerted on them. Sometimes tendons can become painful because  they have suffered an acute injury, in which too much force was exerted at one time, or an overuse injury, in which a normal force was exerted too frequently or over a prolonged period of time. As a result, there is damage to the tendon and its surrounding soft tissue structures and they become inflammed. Because tendons do not have a great blood supply, they do not heal rapidly and the inflammation can become chronic.   Conservative treatment for tendinitis involves rest and anti-inflammatory measures. Ice is applied 15 minutes 2-3 times daily. Anti-inflammatory medications called NSAIDs (ibuprofen, example) can be taken provided they are used with caution, as they can lead to internal bleeding and increase the risk ofstroke and heart attack. Sometimes topical nitroglycerin is prescribed to help with pain. Often your doctor will use a special shoe or removable walking cast to immobilize the tendon, allowing it to heal without further damage from use. These devices are very useful in helping tendons heal, but they may slow you down or make you feel like your hip, knee, or back are out ofalignment. This is temporary and should go away once you are out ofthe immobilization. You should not use a walking cast when showering or driving. Another option is Platelet Rich Plasma injections. (Normally done with a Sports and Orthorapedic doctor.   If conservative measures fail, your physician may need to surgically repair the tendon by removing any chronic inflammatory tissue and sewing it back together. Sometimes it is sewn to an adjacent tendon with similar function for support and sometimes it is lengthened. . Sometimes the bones around the tendon need to be realigned or reshaped to better support the tendon or prevent further damage. Your foot and ankle surgeon will discuss the specifics of your surgery with you, should you need it.    Towel stretch: Sit on a hard surface with your injured leg stretched out in front of  you. Loop a towel around your toes and the ball of your foot and pull the towel toward your body keeping your leg straight. Hold this position for 15 to 30 seconds and then relax. Repeat 3 times. Then push the towel away with the ball of your foot. Repeat 3 times.  When you don't feel much of a stretch using the towel, you can start the standing calf stretch and the following exercises.  Standing calf stretch: Stand facing a wall with your hands on the wall at about eye level. Keep your injured leg back with your heel on the floor. Keep the other leg forward with the knee bent. Turn your back foot slightly inward (as if you were pigeon-toed). Slowly lean into the wall until you feel a stretch in the back of your calf. Hold the stretch for 15 to 30 seconds. Return to the starting position. Repeat 3 times. Do this exercise several times each day.   Standing soleus stretch: Stand facing a wall with your hands on the wall at about chest height. Keep your injured leg back with your heel on the floor. Keep the other leg forward with the knee bent. Turn your back foot slightly inward (as if you were pigeon-toed). Bend your back knee slightly and gently lean into the wall until you feel a stretch in the lower calf of your injured leg. Hold the stretch for 15 to 30 seconds. Return to the starting position. Repeat 3 times.   Achilles stretch: Stand with the ball of one foot on a stair. Reach for the step below with your heel until you feel a stretch in the arch of your foot. Hold this position for 15 to 30 seconds and then relax. Repeat 3 times.   Heel raise: Balance yourself while standing behind a chair or counter. Using the chair or counter as a support to help you, raise your body up onto your toes and hold for 5 seconds. Then slowly lower yourself down without holding onto the support. (It's OK to keep holding onto the support if you need to.) When this exercise becomes less painful, try lowering yourself down on the  injured leg only. Repeat 15 times. Do 2 sets of 15. Rest 30 seconds between sets.   Step-up: Stand with the foot of your injured leg on a support 3 to 5 inches high (like a small step or block of wood). Keep your other foot flat on the floor. Shift your weight onto the injured leg on the support. Straighten your injured leg as the other leg comes off the floor. Return to the starting position by bending your injured leg and slowly lowering your uninjured leg back to the floor. Do 2 sets of 15.   Resisted ankle eversion: Sit with both legs stretched out in front of you, with your feet about a shoulder's width apart. Tie a loop in one end of elastic tubing. Put the foot of your injured leg through the loop so that the tubing goes around the arch of that foot and wraps around the outside of the other foot. Hold onto the other end of the tubing with your hand to provide tension. Turn the foot of your injured leg up and out. Make sure you keep your other foot still so that it will allow the tubing to stretch as you move the foot of your injured leg. Return to the starting position. Do 2 sets of 15.   Balance and reach exercises: Stand next to a chair with your injured leg farther from the chair. The chair will provide support if you need it. Stand on the foot of your injured leg and bend your knee slightly. Try to raise the arch of this foot while keeping your big toe on the floor. Keep your foot in this position. With the hand that is farther away from the chair, reach forward in front of you by bending at the waist. Avoid bending your knee any more as you do this. Repeat this 10 times. To make the exercise more challenging, reach farther in front of you. Do 2 sets of 10.  the same position as above. While keeping your arch height, reach the hand that is farther away from the chair across your body toward the chair. The farther you reach, the more challenging the exercise. Do 2 sets of 10.     Resisted ankle  eversion: Sit with both legs stretched out in front of you, with your feet about a shoulder's width apart. Tie a loop in one end of elastic tubing. Put the foot of your injured leg through the loop so that the tubing goes around the arch of that foot and wraps around the outside of the other foot. Hold onto the other end of the tubing with your hand to provide tension. Turn the foot of your injured leg up and out. Make sure you keep your other foot still so that it will allow the tubing to stretch as you move the foot of your injured leg. Return to the starting position. Do 2 sets of 15.   If you have access to a wobble board, do the following exercises:  Wobble board exercises:   Stand on a wobble board with your feet shoulder width apart. Rock the board forwards and backwards 30 times, then side to side 30 times. Hold on to a chair if you need support.   Rotate the wobble board around so that the edge of the board is in contact with the floor at all times. Do this 30 times in a clockwise and then a counterclockwise direction.   Balance on the wobble board for as long as you can without letting the edges touch the floor. Try to do this for 2 minutes without touching the floor.   Rotate the wobble board in clockwise and counterclockwise circles, but do not let the edge of the board touch the floor.   When you have mastered exercises A through D, try repeating them while standing on just your injured leg.   After you are able to do these exercises on one leg, try to do them with your eyes closed. Make sure you have something nearby to support you in case you lose your balance.

## 2019-02-27 NOTE — LETTER
2/27/2019         RE: Katharina Rodríguez  [address] [facility] [place] [address]        Dear Colleague,    Thank you for referring your patient, Katharina Rodríguez, to the [facility]. Please see a copy of my visit note below.    PATIENT HISTORY:  Dr. Keane requested I see this patient for their foot issue.  Katharina Rodríguez is a 53 year old female who presents to clinic for right ankle pain. Notes that it has been going on for a few months. Started after she had a big move and was carrying a lot of heavy boxes. Has gotten a little better but pain is still 6/10. Has tried icing and new shoes. Has worn inserts for years. She is on her feet a lot at her job. More pain the longer she is on her foot.     Review of Systems:  Patient denies fever, chills, rash, wound, stiffness, numbness, weakness, heart burn, blood in stool, chest pain with activity, calf pain when walking, shortness of breath with activity, chronic cough, easy bleeding/bruising, swelling of ankles, excessive thirst, fatigue, depression, anxiety.  Patient admits to limping at times.     PAST MEDICAL HISTORY:   Past Medical History:   Diagnosis Date     Cervical high risk HPV (human papillomavirus) test positive 11/2015,11/09/16    + HPV (not 16 or 18)     History of colposcopy with cervical biopsy 02/07/2017 02/07/17: [place] Bx's LSIL DEVYN 1.      Hypertension         PAST SURGICAL HISTORY:   Past Surgical History:   Procedure Laterality Date     COLONOSCOPY N/A 11/18/2015    Procedure: COLONOSCOPY;  Surgeon: Kole Owen MD;  Location:  GI     NONE OF THE ABOVE CORE MEASURE DIAGNOSES          MEDICATIONS:   Current Outpatient Medications:      atenolol (TENORMIN) 25 MG tablet, Take 1 tablet (25 mg) by mouth daily, Disp: 90 tablet, Rfl: 3     Multiple Vitamin (MULTIVITAMIN OR), Take 1 capsule by mouth daily., Disp: , Rfl:      ALLERGIES:  No Known Allergies     SOCIAL HISTORY:   Social History     Socioeconomic  "History     Marital status:      Spouse name: Not on file     Number of children: Not on file     Years of education: Not on file     Highest education level: Not on file   Occupational History     Not on file   Social Needs     Financial resource strain: Not on file     Food insecurity:     Worry: Not on file     Inability: Not on file     Transportation needs:     Medical: Not on file     Non-medical: Not on file   Tobacco Use     Smoking status: Never Smoker     Smokeless tobacco: Never Used   Substance and Sexual Activity     Alcohol use: Yes     Comment: social      Drug use: No     Sexual activity: Yes     Partners: Male   Lifestyle     Physical activity:     Days per week: Not on file     Minutes per session: Not on file     Stress: Not on file   Relationships     Social connections:     Talks on phone: Not on file     Gets together: Not on file     Attends Sikh service: Not on file     Active member of club or organization: Not on file     Attends meetings of clubs or organizations: Not on file     Relationship status: Not on file     Intimate partner violence:     Fear of current or ex partner: Not on file     Emotionally abused: Not on file     Physically abused: Not on file     Forced sexual activity: Not on file   Other Topics Concern     Parent/sibling w/ CABG, MI or angioplasty before 65F 55M? No   Social History Narrative     Not on file        FAMILY HISTORY:   Family History   Problem Relation Age of Onset     Hypertension Father      C.A.D. Father      Heart Disease Father      Psychotic Disorder Sister         bi polar     Neurologic Disorder Daughter         parkinsons        EXAM:Vitals: /72   Ht 1.707 m (5' 7.2\")   Wt 75.8 kg (167 lb)   LMP 05/01/2015 (Approximate)   BMI 26.00 kg/m     BMI= Body mass index is 26 kg/m .    General appearance: Patient is alert and fully cooperative with history & exam.  No sign of distress is noted during the visit.     Psychiatric: Affect " is pleasant & appropriate.  Patient appears motivated to improve health.     Respiratory: Breathing is regular & unlabored while sitting.     HEENT: Hearing is intact to spoken word.  Speech is clear.  No gross evidence of visual impairment that would impact ambulation.     Dermatologic: Skin is intact to both lower extremities without significant lesions, rash or abrasion.  No paronychia or evidence of soft tissue infection is noted.     Vascular: DP & PT pulses are intact & regular bilaterally.  No significant edema or varicosities noted.  CFT and skin temperature is normal to both lower extremities.     Neurologic: Lower extremity sensation is intact to light touch.  No evidence of weakness or contracture in the lower extremities.  No evidence of neuropathy.     Musculoskeletal: Patient is ambulatory without assistive device or brace.  Pain on palpation of the right posterior tibial tendon insertion. No pain with eversion or inversion on exam but some weakness on inversion noted.      ASSESSMENT:    Right foot pain  Posterior tibial tendonitis, right  Weakness of right foot     PLAN:  Reviewed patient's chart in [facility]. Reviewed and discussed causes of tendonitis.  We discussed treatments such as immobiliation, icing, stretching, heel lifts, orthotics, physical therapy, MRI.     At this time, recommend immobilization in boot for next month. Recommend compression, warm foot soaks. She will follow up in 1 month. If better, transition to shoes and inserts. If not, recommend MRI.        Kamilla Rios DPM, Podiatry/Foot and Ankle Surgery    Weight management plan: Patient was referred to their PCP to discuss a diet and exercise plan.      Again, thank you for allowing me to participate in the care of your patient.        Sincerely,        Kamilla Rios DPM, Podiatry/Foot and Ankle Surgery

## 2019-02-28 ENCOUNTER — TELEPHONE (OUTPATIENT)
Dept: PODIATRY | Facility: CLINIC | Age: 54
End: 2019-02-28

## 2019-02-28 NOTE — TELEPHONE ENCOUNTER
Printer order from pt chart and faxed to number provided. Pt informed, no other concerns.        Avila Galeano on 2/28/2019 at 2:52 PM

## 2019-02-28 NOTE — TELEPHONE ENCOUNTER
Reason for call:  Order   Order or referral being requested: PT  Reason for request: Patient was seen 2/27 and received a referral for patient but wants one so she can go to [facility].  Please fax to [phone].   Date needed: as soon as possible  Has the patient been seen by the PCP for this problem? YES    Additional comments:     Phone number to reach patient:  Home number on file [phone] (home)    Best Time:  any    Can we leave a detailed message on this number?  YES

## 2019-03-04 ENCOUNTER — TRANSFERRED RECORDS (OUTPATIENT)
Dept: HEALTH INFORMATION MANAGEMENT | Facility: CLINIC | Age: 54
End: 2019-03-04

## 2019-10-16 ENCOUNTER — OFFICE VISIT (OUTPATIENT)
Dept: SLEEP MEDICINE | Facility: CLINIC | Age: 54
End: 2019-10-16
Payer: COMMERCIAL

## 2019-10-16 VITALS
BODY MASS INDEX: 25.93 KG/M2 | WEIGHT: 165.2 LBS | HEART RATE: 75 BPM | OXYGEN SATURATION: 97 % | HEIGHT: 67 IN | DIASTOLIC BLOOD PRESSURE: 75 MMHG | SYSTOLIC BLOOD PRESSURE: 119 MMHG

## 2019-10-16 DIAGNOSIS — G47.9 SLEEP DISTURBANCE: Primary | ICD-10-CM

## 2019-10-16 PROCEDURE — 99204 OFFICE O/P NEW MOD 45 MIN: CPT | Performed by: INTERNAL MEDICINE

## 2019-10-16 RX ORDER — LACTOBACILLUS RHAMNOSUS GG 10B CELL
1 CAPSULE ORAL DAILY
COMMUNITY

## 2019-10-16 RX ORDER — FAMOTIDINE 20 MG
1 TABLET ORAL DAILY
COMMUNITY

## 2019-10-16 ASSESSMENT — MIFFLIN-ST. JEOR: SCORE: 1375.22

## 2019-10-16 NOTE — PATIENT INSTRUCTIONS
Your blood pressure was checked while you were in clinic today.  Please read the guidelines below about what these numbers mean and what you should do about them.  Your systolic blood pressure is the top number.  This is the pressure when the heart is pumping.  Your diastolic blood pressure is the bottom number.  This is the pressure in between beats.  If your systolic blood pressure is less than 120 and your diastolic blood pressure is less than 80, then your blood pressure is normal. There is nothing more that you need to do about it  If your systolic blood pressure is 120-139 or your diastolic blood pressure is 80-89, your blood pressure may be higher than it should be.  You should have your blood pressure re-checked within a year by a primary care provider.  If your systolic blood pressure is 140 or greater or your diastolic blood pressure is 90 or greater, you may have high blood pressure.  High blood pressure is treatable, but if left untreated over time it can put you at risk for heart attack, stroke, or kidney failure.  You should have your blood pressure re-checked by a primary care provider within the next four weeks.  Your BMI is There is no height or weight on file to calculate BMI.  Weight management is a personal decision.  If you are interested in exploring weight loss strategies, the following discussion covers the approaches that may be successful. Body mass index (BMI) is one way to tell whether you are at a healthy weight, overweight, or obese. It measures your weight in relation to your height.  A BMI of 18.5 to 24.9 is in the healthy range. A person with a BMI of 25 to 29.9 is considered overweight, and someone with a BMI of 30 or greater is considered obese. More than two-thirds of American adults are considered overweight or obese.  Being overweight or obese increases the risk for further weight gain. Excess weight may lead to heart disease and diabetes.  Creating and following plans for  healthy eating and physical activity may help you improve your health.  Weight control is part of healthy lifestyle and includes exercise, emotional health, and healthy eating habits. Careful eating habits lifelong are the mainstay of weight control. Though there are significant health benefits from weight loss, long-term weight loss with diet alone may be very difficult to achieve- studies show long-term success with dietary management in less than 10% of people. Attaining a healthy weight may be especially difficult to achieve in those with severe obesity. In some cases, medications, devices and surgical management might be considered.  What can you do?  If you are overweight or obese and are interested in methods for weight loss, you should discuss this with your provider.     Consider reducing daily calorie intake by 500 calories.     Keep a food journal.     Avoiding skipping meals, consider cutting portions instead.    Diet combined with exercise helps maintain muscle while optimizing fat loss. Strength training is particularly important for building and maintaining muscle mass. Exercise helps reduce stress, increase energy, and improves fitness. Increasing exercise without diet control, however, may not burn enough calories to loose weight.       Start walking three days a week 10-20 minutes at a time    Work towards walking thirty minutes five days a week     Eventually, increase the speed of your walking for 1-2 minutes at time    In addition, we recommend that you review healthy lifestyles and methods for weight loss available through the National Institutes of Health patient information sites:  http://win.niddk.nih.gov/publications/index.htm    And look into health and wellness programs that may be available through your health insurance provider, employer, local community center, or olive club.    MY TREATMENT INFORMATION FOR SLEEP APNEA-  Katharina Rodríguez    DOCTOR : Jefferson Torres  "[facility] :      MY CONTACT NUMBER:     Am I having a sleep study at a sleep center?  Make sure you have an appointment for the study before you leave!    Am I having a home sleep study?  Watch this video:  https://[email or web address] Messaging.com/watch?v=CteI_[email or web address]&[email or web address]  Please verify your insurance coverage with your insurance carrier    Frequently asked questions:  1. What is Obstructive Sleep Apnea (NANY)? NANY is the most common type of sleep apnea. Apnea means, \"without breath.\"  Apnea is most often caused by narrowing or collapse of the upper airway as muscles relax during sleep.   Almost everyone has occasional apneas. Most people with sleep apnea have had brief interruptions at night frequently for many years.  The severity of sleep apnea is related to how frequent and severe the events are.   2. What are the consequences of NANY? Symptoms include: feeling sleepy during the day, snoring loudly, gasping or stopping of breathing, trouble sleeping, and occasionally morning headaches or heartburn at night.  Sleepiness can be serious and even increase the risk of falling asleep while driving. Other health consequences may include development of high blood pressure and other cardiovascular disease in persons who are susceptible. Untreated NANY  can contribute to heart disease, stroke and diabetes.   3. What are the treatment options? In most situations, sleep apnea is a lifelong disease that must be managed with daily therapy. Medications are not effective for sleep apnea and surgery is generally not considered until other therapies have been tried. Your treatment is your choice . Continuous Positive Airway (CPAP) works right away and is the therapy that is effective in nearly everyone. An oral device to hold your jaw forward is usually the next most reliable option. Other options include postioning devices (to keep you off your back), weight loss, and surgery including a " tongue pacing device. There is more detail about some of these options below.    Important tips for using CPAP and similar devices   Know your equipment:  CPAP is continuous positive airway pressure that prevents obstructive sleep apnea by keeping the throat from collapsing while you are sleeping. In most cases, the device is  smart  and can slowly self-adjusts if your throat collapses and keeps a record every day of how well you are treated-this information is available to you and your care team.  BPAP is bilevel positive airway pressure that keeps your throat open and also assists each breath with a pressure boost to maintain adequate breathing.  Special kinds of BPAP are used in patients who have inadequate breathing from lung or heart disease. In most cases, the device is  smart  and can slowly self-adjusts to assist breathing. Like CPAP, the device keeps a record of how well you are treated.  Your mask is your connection to the device. You get to choose what feels most comfortable and the staff will help to make sure if fits. Here: are some examples of the different masks that are available:       Key points to remember on your journey with sleep apnea:  1. Sleep study.  PAP devices often need to be adjusted during a sleep study to show that they are effective and adjusted right.  2. Good tips to remember: Try wearing just the mask during a quiet time during the day so your body adapts to wearing it. A humidifier is recommended for comfort in most cases to prevent drying of your nose and throat. Allergy medication from your provider may help you if you are having nasal congestion.  3. Getting settled-in. It takes more than one night for most of us to get used to wearing a mask. Try wearing just the mask during a quiet time during the day so your body adapts to wearing it. A humidifier is recommended for comfort in most cases. Our team will work with you carefully on the first day and will be in contact within 4  days and again at 2 and 4 weeks for advice and remote device adjustments. Your therapy is evaluated by the device each day.   4. Use it every night. The more you are able to sleep naturally for 7-8 hours, the more likely you will have good sleep and to prevent health risks or symptoms from sleep apnea. Even if you use it 4 hours it helps. Occasionally all of us are unable to use a medical therapy, in sleep apnea, it is not dangerous to miss one night.   5. Communicate. Call our skilled team on the number provided on the first day if your visit for problems that make it difficult to wear the device. Over 2 out of 3 patients can learn to wear the device long-term with help from our team. Remember to call our team or your sleep providers if you are unable to wear the device as we may have other solutions for those who cannot adapt to mask CPAP therapy. It is recommended that you sleep your sleep provider within the first 3 months and yearly after that if you are not having problems.   6. Use it for your health. We encourage use of CPAP masks during daytime quiet periods to allow your face and brain to adapt to the sensation of CPAP so that it will be a more natural sensation to awaken to at night or during naps. This can be very useful during the first few weeks or months of adapting to CPAP though it does not help medically to wear CPAP during wakefulness and  should not be used as a strategy just to meet guidelines.  7. Take care of your equipment. Make sure you clean your mask and tubing using directions every day and that your filter and mask are replaced as recommended or if they are not working.     BESIDES CPAP, WHAT OTHER THERAPIES ARE THERE?    Positioning Device  Positioning devices are generally used when sleep apnea is mild and only occurs on your back.This example shows a pillow that straps around the waist. It may be appropriate for those whose sleep study shows milder sleep apnea that occurs primarily  when lying flat on one's back. Preliminary studies have shown benefit but effectiveness at home may need to be verified by a home sleep test. These devices are generally not covered by medical insurance.  Examples of devices that maintain sleeping on the back to prevent snoring and mild sleep apnea.    Belt type body positioner  Http://Stonewedge.mobiManage/    Electronic reminder  Http://nightshifttherapy.com/  Http://www.Bruxie.mobiManage.au/      Oral Appliance  What is oral appliance therapy?  An oral appliance device fits on your teeth at night like a retainer used after having braces. The device is made by a specialized dentist and requires several visits over 1-2 months before a manufactured device is made to fit your teeth and is adjusted to prevent your sleep apnea. Once an oral device is working properly, snoring should be improved. A home sleep test may be recommended at that time if to determine whether the sleep apnea is adequately treated.       Some things to remember:  -Oral devices are often, but not always, covered by your medical insurance. Be sure to check with your insurance provider.   -If you are referred for oral therapy, you will be given a list of specialized dentists to consider or you may choose to visit the Web site of the American Academy of Dental Sleep Medicine  -Oral devices are less likely to work if you have severe sleep apnea or are extremely overweight.     More detailed information  An oral appliance is a small acrylic device that fits over the upper and lower teeth  (similar to a retainer or a mouth guard). This device slightly moves jaw forward, which moves the base of the tongue forward, opens the airway, improves breathing for effective treat snoring and obstructive sleep apnea in perhaps 7 out of 10 people .  The best working devices are custom-made by a dental device  after a mold is made of the teeth 1, 2, 3.  When is an oral appliance indicated?  Oral appliance therapy is  recommended as a first-line treatment for patients with primary snoring, mild sleep apnea, and for patients with moderate sleep apnea who prefer appliance therapy to use of CPAP4, 5. Severity of sleep apnea is determined by sleep testing and is based on the number of respiratory events per hour of sleep.   How successful is oral appliance therapy?  The success rate of oral appliance therapy in patients with mild sleep apnea is 75-80% while in patients with moderate sleep apnea it is 50-70%. The chance of success in patients with severe sleep apnea is 40-50%. The research also shows that oral appliances have a beneficial effect on the cardiovascular health of NANY patients at the same magnitude as CPAP therapy7.  Oral appliances should be a second-line treatment in cases of severe sleep apnea, but if not completely successful then a combination therapy utilizing CPAP plus oral appliance therapy may be effective. Oral appliances tend to be effective in a broad range of patients although studies show that the patients who have the highest success are females, younger patients, those with milder disease, and less severe obesity. 3, 6.   Finding a dentist that practices dental sleep medicine  Specific training is available through the American Academy of Dental Sleep Medicine for dentists interested in working in the field of sleep. To find a dentist who is educated in the field of sleep and the use of oral appliances, near you, visit the Web site of the American Academy of Dental Sleep Medicine.    References  1. Hipolito et al. Objectively measured vs self-reported compliance during oral appliance therapy for sleep-disordered breathing. Chest 2013; 144(5): 0297-8900.  2. Mahamed et al. Objective measurement of compliance during oral appliance therapy for sleep-disordered breathing. Thorax 2013; 68(1): 91-96.  3. Jero et al. Mandibular advancement devices in 620 men and women with NANY and snoring:  tolerability and predictors of treatment success. Chest 2004; 125: 9821-7084.  4. Alley et al. Oral appliances for snoring and NANY: a review. Sleep 2006; 29: 244-262.  5. Estuardo et al. Oral appliance treatment for NANY: an update. J Clin Sleep Med 2014; 10(2): 215-227.  6. Rocco et al. Predictors of OSAH treatment outcome. J Dent Res 2007; 86: 7736-3970.      Weight Loss:    Weight loss is a long-term strategy that may improve sleep apnea in some patients.    Weight management is a personal decision and the decision should be based on your interest and the potential benefits.  If you are interested in exploring weight loss strategies, the following discussion covers the impact on weight loss on sleep apnea and the approaches that may be successful.    Being overweight does not necessarily mean you will have health consequences.  Those who have BMI over 35 or over 27 with existing medical conditions carries greater risk.   Weight loss decreases severity of sleep apnea in most people with obesity. For those with mild obesity who have developed snoring with weight gain, even 15-30 pound weight loss can improve and occasionally eliminate sleep apnea.  Structured and life-long dietary and health habits are necessary to lose weight and keep healthier weight levels.     Though there may be significant health benefits from weight loss, long-term weight loss is very difficult to achieve- studies show success with dietary management in less than 10% of people. In addition, substantial weight loss may require years of dietary control and may be difficult if patients have severe obesity. In these cases, surgical management may be considered.  Finally, older individuals who have tolerated obesity without health complications may be less likely to benefit from weight loss strategies.        Your BMI is Body mass index is 25.72 kg/m .  Weight management is a personal decision.  If you are interested in exploring weight  loss strategies, the following discussion covers the approaches that may be successful. Body mass index (BMI) is one way to tell whether you are at a healthy weight, overweight, or obese. It measures your weight in relation to your height.  A BMI of 18.5 to 24.9 is in the healthy range. A person with a BMI of 25 to 29.9 is considered overweight, and someone with a BMI of 30 or greater is considered obese. More than two-thirds of American adults are considered overweight or obese.  Being overweight or obese increases the risk for further weight gain. Excess weight may lead to heart disease and diabetes.  Creating and following plans for healthy eating and physical activity may help you improve your health.  Weight control is part of healthy lifestyle and includes exercise, emotional health, and healthy eating habits. Careful eating habits lifelong are the mainstay of weight control. Though there are significant health benefits from weight loss, long-term weight loss with diet alone may be very difficult to achieve- studies show long-term success with dietary management in less than 10% of people. Attaining a healthy weight may be especially difficult to achieve in those with severe obesity. In some cases, medications, devices and surgical management might be considered.  What can you do?  If you are overweight or obese and are interested in methods for weight loss, you should discuss this with your provider.     Consider reducing daily calorie intake by 500 calories.     Keep a food journal.     Avoiding skipping meals, consider cutting portions instead.    Diet combined with exercise helps maintain muscle while optimizing fat loss. Strength training is particularly important for building and maintaining muscle mass. Exercise helps reduce stress, increase energy, and improves fitness. Increasing exercise without diet control, however, may not burn enough calories to loose weight.       Start walking three days a week  10-20 minutes at a time    Work towards walking thirty minutes five days a week     Eventually, increase the speed of your walking for 1-2 minutes at time    In addition, we recommend that you review healthy lifestyles and methods for weight loss available through the National Institutes of Health patient information sites:  http://win.niddk.nih.gov/publications/index.htm    And look into health and wellness programs that may be available through your health insurance provider, employer, local community center, or olive club.          Surgery:    Surgery for obstructive sleep apnea is considered generally only when other therapies fail to work. Surgery may be discussed with you if you are having a difficult time tolerating CPAP and or when there is an abnormal structure that requires surgical correction.  Nose and throat surgeries often enlarge the airway to prevent collapse.  Most of these surgeries create pain for 1-2 weeks and up to half of the most common surgeries are not effective throughout life.  You should carefully discuss the benefits and drawbacks to surgery with your sleep provider and surgeon to determine if it is the best solution for you.   More information  Surgery for NANY is directed at areas that are responsible for narrowing or complete obstruction of the airway during sleep.  There are a wide range of procedures available to enlarge and/or stabilize the airway to prevent blockage of breathing in the three major areas where it can occur: the palate, tongue, and nasal regions.  Successful surgical treatment depends on the accurate identification of the factors responsible for obstructive sleep apnea in each person.  A personalized approach is required because there is no single treatment that works well for everyone.  Because of anatomic variation, consultation with an examination by a sleep surgeon is a critical first step in determining what surgical options are best for each patient.  In some  cases, examination during sedation may be recommended in order to guide the selection of procedures.  Patients will be counseled about risks and benefits as well as the typical recovery course after surgery. Surgery is typically not a cure for a person s NANY.  However, surgery will often significantly improve one s NANY severity (termed  success rate ).  Even in the absence of a cure, surgery will decrease the cardiovascular risk associated with OSA7; improve overall quality of life8 (sleepiness, functionality, sleep quality, etc).      Palate Procedures:  Patients with NANY often have narrowing of their airway in the region of their tonsils and uvula.  The goals of palate procedures are to widen the airway in this region as well as to help the tissues resist collapse.  Modern palate procedure techniques focus on tissue conservation and soft tissue rearrangement, rather than tissue removal.  Often the uvula is preserved in this procedure. Residual sleep apnea is common in patient after pharyngoplasty with an average reduction in sleep apnea events of 33%2.      Tongue Procedures:  ExamWhile patients are awake, the muscles that surround the throat are active and keep this region open for breathing. These muscles relax during sleep, allowing the tongue and other structures to collapse and block breathing.  There are several different tongue procedures available.  Selection of a tongue base procedure depends on characteristics seen on physical exam.  Generally, procedures are aimed at removing bulky tissues in this area or preventing the back of the tongue from falling back during sleep.  Success rates for tongue surgery range from 50-62%3.    Hypoglossal Nerve Stimulation:  Hypoglossal nerve stimulation has recently received approval from the [place] Food and Drug Administration for the treatment of obstructive sleep apnea.  This is based on research showing that the system was safe and effective in treating sleep  apnea6.  Results showed that the median AHI score decreased 68%, from 29.3 to 9.0. This therapy uses an implant system that senses breathing patterns and delivers mild stimulation to airway muscles, which keeps the airway open during sleep.  The system consists of three fully implanted components: a small generator (similar in size to a pacemaker), a breathing sensor, and a stimulation lead.  Using a small handheld remote, a patient turns the therapy on before bed and off upon awakening.    Candidates for this device must be greater than 22 years of age, have moderate to severe NANY (AHI between 20-65), BMI less than 32, have tried CPAP/oral appliance without success, and have appropriate upper airway anatomy (determined by a sleep endoscopy performed by Dr. Cooper).    Hypoglossal Nerve Stimulation Pathway:    The sleep surgeon s office will work with the patient through the insurance prior-authorization process (including communications and appeals).    Nasal Procedures:  Nasal obstruction can interfere with nasal breathing during the day and night.  Studies have shown that relief of nasal obstruction can improve the ability of some patients to tolerate positive airway pressure therapy for obstructive sleep apnea1.  Treatment options include medications such as nasal saline, topical corticosteroid and antihistamine sprays, and oral medications such as antihistamines or decongestants. Non-surgical treatments can include external nasal dilators for selected patients. If these are not successful by themselves, surgery can improve the nasal airway either alone or in combination with these other options.      Combination Procedures:  Combination of surgical procedures and other treatments may be recommended, particularly if patients have more than one area of narrowing or persistent positional disease.  The success rate of combination surgery ranges from 66-80%2,3.    References  1. Manny FROST. The Role of the Nose in  Snoring and Obstructive Sleep Apnoea: An Update.  Eur Arch Otorhinolaryngol. 2011; 268: 1365-73.  2.  Calixto SM; Charissa JA; Farrah JR; Pallanch JF; Juan MB; Jean Pierre SG; Susan BEY. Surgical modifications of the upper airway for obstructive sleep apnea in adults: a systematic review and meta-analysis. SLEEP 2010;33(10):0973-3933. Elvi PARK. Hypopharyngeal surgery in obstructive sleep apnea: an evidence-based medicine review.  Arch Otolaryngol Head Neck Surg. 2006 Feb;132(2):206-13.  3. Chi YH1, Shayla Y, Anthony JUDY. The efficacy of anatomically based multilevel surgery for obstructive sleep apnea. Otolaryngol Head Neck Surg. 2003 Oct;129(4):327-35.  4. Elvi PARK, Goldberg A. Hypopharyngeal Surgery in Obstructive Sleep Apnea: An Evidence-Based Medicine Review. Arch Otolaryngol Head Neck Surg. 2006 Feb;132(2):206-13.  5. Macario SANTOS et al. Upper-Airway Stimulation for Obstructive Sleep Apnea.  N Engl J Med. 2014 Jan 9;370(2):139-49.  6. Emilie Y et al. Increased Incidence of Cardiovascular Disease in Middle-aged Men with Obstructive Sleep Apnea. Am J Respir Crit Care Med; 2002 166: 159-165  7. Cristal BETANCOURT et al. Studying Life Effects and Effectiveness of Palatopharyngoplasty (SLEEP) study: Subjective Outcomes of Isolated Uvulopalatopharyngoplasty. Otolaryngol Head Neck Surg. 2011; 144: 623-631.

## 2019-10-17 NOTE — PROGRESS NOTES
Sleep Consultation Note:    Date on this visit: 10/16/2019    Katharina Rodríguez is sent by Elenita Keane for a sleep consultation regarding evaluation fort possible sleep apnea.    Primary Physician: Elenita Keane     CHIEF COMPLAINT AND PURPOSE OF VISIT:  Wakes up multiple times during the night, sometimes coughing and choking.  This has been going on for the past 4 years.      The patient reports snoring, occasional snort arousals and occasionally waking up gasping for air and choking.  There have not been any reports of observed apneas during sleep.  She reports morning headaches at a frequency of 2 days per week.  She reports dry mouth upon awakening.  She denies chronic nasal congestion.  She reports occasional symptoms of acid reflux.  She sleeps on her back and on her sides.      Her sleep-wake schedule are variable because of her work hours, but on average her bedtime is around 9:30 p.m. and she wakes up between 4:30-5:00 a.m. She actually wakes up spontaneously before the alarm goes off.  She works 7 days a week.  She does not feel rested upon awakening from sleep.  It takes approximately 5 minutes for her to fall asleep.  Her main concern is frequent nocturnal awakenings, sometimes for snoring and sometimes for no apparent reason, though she is able to resume sleep in less than 10 minutes following the nocturnal awakening.  She looks at the clock and sometimes uses the bathroom when she wakes up in the middle of the night.  On average she reports getting 7-8 hours of sleep per night.  She naps every day for  20 minutes when she gets a chance and she feels better after she wakes up from the nap.      She uses electronics in bed.      She reports that a few times a month when she stays up too late and tired, her legs start feeling uncomfortable, it is something deep in her legs.  Once in a while the legs may twitch and for her  that is an indication for her that she has to go to bed and  when she goes to bed her symptoms resolve and she does not report any urge to move; hence, her symptoms do not really suggest RLS.      She denies  nightmares, sleepwalking or sleep eating or dream enactment behavior.  She does report clenching and possibly grinding teeth.  She has a mouthguard but does not really like it because it is not comfortable.      She has occasionally felt drowsy while driving, but denies any accidents due to drowsy driving.      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      She denies cataplexy or sleep paralysis or hallucinations.     CURRENT MEDS:  Current Outpatient Medications   Medication Sig Dispense Refill     atenolol (TENORMIN) 25 MG tablet Take 1 tablet (25 mg) by mouth daily 90 tablet 3     lactobacillus rhamnosus, GG, (CULTURELL) capsule Take 1 capsule by mouth 2 times daily       Vitamin D, Cholecalciferol, 1000 units CAPS Take 1 capsule by mouth       dexamethasone (DECADRON) 4 MG/ML injection Apply 1 mL (4 mg) topically once for 1 dose For physical therapy, iontophoresis 30 mL 0     Multiple Vitamin (MULTIVITAMIN OR) Take 1 capsule by mouth daily.       order for DME Equipment being ordered: tall aircast boot 1 Device 0     ALLERGIES:No Known Allergies    FAMILY HISTORY:  FAMILY HISTORY PERTINENT TO SLEEP DISORDERS:  Father snores.  Her sister used to sleepwalk during her childhood.   Family History   Problem Relation Age of Onset     Hypertension Father      C.A.D. Father      Heart Disease Father      Psychotic Disorder Sister         bi polar     Neurologic Disorder Daughter         parkinsons        SOCIAL HISTORY:  She is .  She lives with her daughter.  She is a , massage therapist.  She drinks 1 cup of coffee per day in the morning.  Never smoked cigarettes.  Occasionally drinks alcohol, not used as a sleep aid.  No illicit drugs.      PAST MEDICAL HISTORY:   Patient Active Problem List   Diagnosis     Benign  hypertension     Osteoarthritis of thumb     Ganglion cyst     Cervical high risk HPV (human papillomavirus) test positive     Carey right     Past Medical History:   Diagnosis Date     Cervical high risk HPV (human papillomavirus) test positive 11/2015,11/09/16    + HPV (not 16 or 18)     History of colposcopy with cervical biopsy 02/07/2017 02/07/17: [place] Bx's LSIL DEVYN 1.      Hypertension      PAST SURGICAL HISTORY:   Past Surgical History:   Procedure Laterality Date     COLONOSCOPY N/A 11/18/2015    Procedure: COLONOSCOPY;  Surgeon: Kole Owen MD;  Location:  GI     NONE OF THE ABOVE CORE MEASURE DIAGNOSES          REVIEW OF SYSTEMS: The 14 point ROS was completed. In addition to symptoms listed under HPI, and the symptoms listed below, the rest of the ROS is negative:   Weight gain, sore throat, irregular heartbeat.  He was told that she had PVCs in the past and had workup.  Hypertension, shortness of breath with activity, dry cough sometimes, and anxiety.       Social History:  Social History     Socioeconomic History     Marital status:      Spouse name: Not on file     Number of children: Not on file     Years of education: Not on file     Highest education level: Not on file   Occupational History     Not on file   Social Needs     Financial resource strain: Not on file     Food insecurity:     Worry: Not on file     Inability: Not on file     Transportation needs:     Medical: Not on file     Non-medical: Not on file   Tobacco Use     Smoking status: Never Smoker     Smokeless tobacco: Never Used   Substance and Sexual Activity     Alcohol use: Yes     Comment: social      Drug use: No     Sexual activity: Yes     Partners: Male   Lifestyle     Physical activity:     Days per week: Not on file     Minutes per session: Not on file     Stress: Not on file   Relationships     Social connections:     Talks on phone: Not on file     Gets together: Not on file     Attends [employer]  "service: Not on file     Active member of club or organization: Not on file     Attends meetings of clubs or organizations: Not on file     Relationship status: Not on file     Intimate partner violence:     Fear of current or ex partner: Not on file     Emotionally abused: Not on file     Physically abused: Not on file     Forced sexual activity: Not on file   Other Topics Concern     Parent/sibling w/ CABG, MI or angioplasty before 65F 55M? No   Social History Narrative     Not on file         Physical Examination:  Vitals: /75   Pulse 75   Ht 1.707 m (5' 7.21\")   Wt 74.9 kg (165 lb 3.2 oz)   LMP 05/01/2015 (Approximate)   SpO2 97%   BMI 25.72 kg/m    BMI= Body mass index is 25.72 kg/m .    Neck Cir (cm): 34 cm    [place] Total Score 10/16/2019   Total score - [place] 4       General: No apparent distress, appropriately groomed  Head: Normocephalic, atraumatic  Eyes: no icterus, PERRL  Nose: Nares patent. No exudate/erythema. No septal deviation noted.  Mouth: op pink and moist  Orophraynx: Opening is narrowed, uvula:Thick   Mallampati Class: IV  Neck: Supple, Circumference: 13.25 inches  Cardiac: Regular rate and rhythm  Chest: Symmetric air movement, lungs clear to auscultation bilaterally  Musculoskeletal: no edema noted  Skin: Warm, dry, intact  Psych: Mood pleasant, affect congruent  Neuro:  Mental status: Awake, alert, attentive, oriented.  Motor: Tone within normal limit  Gait: Normal width, stride length   No focal deficits    Impression/Plan:    Snoring, occasionally waking up gasping for air and choking, in the setting of HTN. Possible Obstructive sleep apnea  STOP BANG score is 4/8.  Recommend HST as patient is high risk for NANY without any comorbid conditions. If HST is negative will consider obtaining PSG.  Diagnosis and treatment for NANY have been discussed. Complications of untreated NANY have also been discussed.    HTN: Her blood pressure was noted to be normal. we discussed the " "association of NANY and HTN and  CVD.    Encouraged to follow good sleep hygiene/behavioral techniques.     Patient is a life long non-smoker and has been encouraged to continue to not smoke.    Encouraged healthy diet, and exercise.    Patient was strongly advised to avoid driving, operating any heavy machinery or other hazardous situations while drowsy or sleepy.  Patient was counseled on the importance of driving while alert, to pull over if drowsy, or nap before getting into the vehicle if sleepy.        Plan to communicate the HST results via MY CHART.        CC: Elenita Keane    The above note was dictated using voice recognition software. Although reviewed after completion, some word and grammatical error may remain . Please contact the author for any clarifications.    \"I spent a total of 45 minutes face to face with Nigel Rodríguez during today's office visit. Over 50% of this time was spent counseling the patient and  coordinating care regarding sleep apnea, HST/PSG, NANY and HTN.\"       Jefferson Wall MD   of Medicine,  Division of Pulmonary/Sleep Medicine  [facility].                           D: 10/16/2019   T: 10/16/2019   MT:       Name:     NIGEL RODRÍGUEZ   MRN:      [id number]        Account:      [id number]   :      1965           Visit Date:   10/16/2019      Document: [id number]      "

## 2019-10-22 ENCOUNTER — OFFICE VISIT (OUTPATIENT)
Dept: SLEEP MEDICINE | Facility: CLINIC | Age: 54
End: 2019-10-22
Payer: COMMERCIAL

## 2019-10-22 DIAGNOSIS — G47.9 SLEEP DISTURBANCE: ICD-10-CM

## 2019-10-23 ENCOUNTER — DOCUMENTATION ONLY (OUTPATIENT)
Dept: SLEEP MEDICINE | Facility: CLINIC | Age: 54
End: 2019-10-23

## 2019-11-04 ENCOUNTER — TELEPHONE (OUTPATIENT)
Dept: SLEEP MEDICINE | Facility: CLINIC | Age: 54
End: 2019-11-04

## 2019-11-04 NOTE — TELEPHONE ENCOUNTER
JANESSA with Katharina with [facility], and Jeni BLAND number to call to reschedule pickup and use of HST at [facility].

## 2019-11-19 ENCOUNTER — DOCUMENTATION ONLY (OUTPATIENT)
Dept: SLEEP MEDICINE | Facility: CLINIC | Age: 54
End: 2019-11-19

## 2019-11-19 ENCOUNTER — OFFICE VISIT (OUTPATIENT)
Dept: SLEEP MEDICINE | Facility: CLINIC | Age: 54
End: 2019-11-19

## 2019-11-19 DIAGNOSIS — G47.9 SLEEP DISTURBANCE: ICD-10-CM

## 2019-11-19 DIAGNOSIS — G47.33 OSA (OBSTRUCTIVE SLEEP APNEA): Primary | ICD-10-CM

## 2019-11-19 PROCEDURE — G0399 HOME SLEEP TEST/TYPE 3 PORTA: HCPCS | Performed by: INTERNAL MEDICINE

## 2019-11-19 NOTE — Clinical Note
Herbert Solis, kindly check with the patient if she would like sleep study with all-night titration or treatment with auto CPAP.  I have generated the prescription for an auto CPAP if she is interested. Kindly communicate this and coordinate with the [facility] for her to get the device . She will follow-up at the sleep clinic in 6 to 8 weeks after she obtains the device.I will be generating the orders for oximetry asap.Thank you,Gissell

## 2019-11-20 ENCOUNTER — DOCUMENTATION ONLY (OUTPATIENT)
Dept: SLEEP MEDICINE | Facility: CLINIC | Age: 54
End: 2019-11-20

## 2019-11-20 NOTE — PROGRESS NOTES
This HSAT was performed using a Noxturnal T3 device which recorded snore, sound, movement activity, body position, nasal pressure, oronasal thermal airflow, pulse, oximetry and both chest and abdominal respiratory effort. HSAT data was restricted to the time patient states they were in bed.     HSAT was scored using 1B 4% hypopnea rule.     AHI: 41.9. Snoring was reported as loud.  Time with SpO2 below 89% was 33.3 minutes.   Overall signal quality was good     Pt will follow up with sleep provider to determine appropriate therapy.     Ordering [facility] MORRELL Snigdhasmrithi Sagar, MD Charles O. BA, RPS, RST System Clinical Specialist 11/20/2019

## 2019-11-22 NOTE — PROCEDURES
"HOME SLEEP STUDY INTERPRETATION    Patient: Katharina Rodríguez  MRN: [id number]  YOB: 1965  Study Date: 2019  Referring Provider: Elenita Keane  Ordering Provider:Jefferson Wall MD     Indications for Home Study: Katharina Rodríguez is a 54 year old female with a history of hypertension who reports snoring, occasional snort arousals and occasionally waking up gasping for air and choking.  There have not been any reports of observed apneas during sleep.  Home sleep study was obtained to evaluate for possible obstructive sleep apnea.    Estimated body mass index is 25.72 kg/m  as calculated from the following:    Height as of 10/16/19: 1.707 m (5' 7.21\").    Weight as of 10/16/19: 74.9 kg (165 lb 3.2 oz).    Mobile Sleepiness Scale:   STOP-BAN/8    Data: A full night home sleep study was performed recording the standard physiologic parameters including body position, movement, sound, nasal pressure, thermal oral airflow, chest and abdominal movements with respiratory inductance plethysmography, and oxygen saturation by pulse oximetry. Pulse rate was estimated by oximetry recording. This study was considered adequate  based on > 4 hours of quality oximetry and respiratory recording. As specified by the AASM Manual for the Scoring of Sleep and Associated events, version 2.3, Rule VIII.D 1B, 4% oxygen desaturation scoring for hypopneas is used as a standard of care on all home sleep apnea testing.    Analysis Time: 456.4 minutes    Respiration:   Sleep Associated Hypoxemia: sustained hypoxemia was present. Baseline oxygen saturation was 93.5 %.  Time with saturation less than or equal to 88% was 33.3 minutes. The lowest oxygen saturation was 82 %.   Snoring: Snoring was present.  Respiratory events: The home study revealed a presence of 176 obstructive apneas and 33 mixed and central apneas. There were 110 hypopneas resulting in a combined apnea/hypopnea " index [AHI] of 41.9 events per hour.  AHI was 47.1 per hour supine, - per hour prone, 40.0 per hour on left side, and 21.8 per hour on right side.   Pattern: Excluding events noted above, respiratory rate and pattern was Normal.    Position: Percent of time spent: supine -53.9 %, prone -0 %, on left -35.9 %, on right -10.3 %.    Heart Rate: By pulse oximetry, the average rate was normal at 72 bpm. The maximum rate was 100 bpm and the minimum rate was 55 bpm.    Assessment:   Severe obstructive sleep apnea, during supine and nonsupine sleep positions.  Sleep associated hypoxemia was present.    Recommendations:  Consider A) polysomnography with all-night titration using positive airway pressure device to establish the optimum treatment to control the sleep-related breathing disorder                   or                   B) empiric treatment with auto titrating CPAP with pressures 5-15 cmH2O with clinical follow-up and review of compliance measures.  Suggest obtaining an overnight oximetry with the auto CPAP to check for resolution of the hypoxemia                   or                                    C) oral appliance through referral to dentistry  Suggest optimizing sleep hygiene and avoiding sleep deprivation.  Weight management.    Diagnosis Code(s): Obstructive Sleep Apnea G47.33, Hypoxemia G47.36, Snoring R06.83    Jefferson Wall MD, November 22, 2019   Diplomate, American Board of Internal Medicine, Sleep Medicine

## 2019-12-02 ENCOUNTER — THERAPY VISIT (OUTPATIENT)
Dept: SLEEP MEDICINE | Facility: CLINIC | Age: 54
End: 2019-12-02
Payer: COMMERCIAL

## 2019-12-02 ENCOUNTER — DOCUMENTATION ONLY (OUTPATIENT)
Dept: SLEEP MEDICINE | Facility: CLINIC | Age: 54
End: 2019-12-02

## 2019-12-02 DIAGNOSIS — G47.33 OSA (OBSTRUCTIVE SLEEP APNEA): ICD-10-CM

## 2019-12-02 DIAGNOSIS — G47.33 OSA (OBSTRUCTIVE SLEEP APNEA): Primary | ICD-10-CM

## 2019-12-02 PROCEDURE — 95811 POLYSOM 6/>YRS CPAP 4/> PARM: CPT | Performed by: INTERNAL MEDICINE

## 2019-12-02 NOTE — Clinical Note
Herbert Solis,Please call pt to schedule DME visit to obtain CPAP set up. DME orders in epic. F/u in 7-8 weeks after starting therapy.Thanks,Gissell

## 2019-12-03 NOTE — PROGRESS NOTES
Completed a all night titration PSG per provider order.    .  A final therapeutic PAP pressure was achieved.    Supine REM was seen on therapeutic pressure.    Patient reports feeling refreshed in AM.

## 2019-12-08 NOTE — PROCEDURES
" SLEEP STUDY INTERPRETATION  TITRATION STUDY      Patient: NIGEL BUTLER  YOB: 1965  Study Date: 12/2/2019  MRN: [id number]  Referring Provider: -  Ordering Provider: Gissell Wall MD    Indications for Polysomnography: The patient is a 54 y old Female who is 5' 7\" and weighs 165.0 lbs. Her BMI is 25.9, [place] sleepiness scale 4.0 and neck circumference is 34.0 cm. Relevant medical history includes history of hypertension . Patient  reports snoring, occasional snort arousals and occasionally waking up gasping for air and choking. Home sleep study obtained on 11/19/19 showed severe obstructive sleep apnea with apnea/hypopnea index [AHI] of 41.9 events per hour and sleep associated hypoxemia. A polysomnogram with PAP titration was performed to correct for the sleep related breathing disorder.    Polysomnogram Data: A full night polysomnogram recorded the standard physiologic parameters including EEG, EOG, EMG, ECG, nasal and oral airflow. Respiratory parameters of chest and abdominal movements were recorded with respiratory inductance plethysmography. Oxygen saturation was recorded by pulse oximetry. Hypopnea scoring rule used: 1B 4%.    Treatment PSG  Sleep Architecture: All sleep stages seen with  increased stages N3 and REM sleep.  The total recording time of the polysomnogram was 473.5 minutes. The total sleep time was 401.5 minutes. Sleep latency was normal at 11.0 minutes without the use of a sleep aid. REM latency was 46.5 minutes. Arousal index was normal at 10.0 arousals per hour. Sleep efficiency was 84.8%. Wake after sleep onset was 34.0 minutes. The patient spent 4.9% of total sleep time in Stage N1, 33.9% in Stage N2, 34.6% in Stage N3, and 26.7% in REM. Time in REM supine was 66.5 minutes.    Respiration: Optimum CPAP titration was achieved    The patient was titrated at pressures ranging from CPAP 5 cmH2O up to CPAP 6 cmH2O. The final pressure achieved was CPAP 6 cmH2O " with a residual AHI of 2.1 events per hour. Time in REM supine on final pressure was 66.5 minutes.     This titration was considered Optimal (residual AHI < 5 events per hour and including REM supine sleep at final pressure).     Snoring - was reported as absent on treatment.    Respiratory rate and pattern - was notable for normal respiratory rate and pattern.    Sustained Sleep Associated Hypoventilation - Transcutaneous carbon dioxide monitoring was not used, however significant hypoventilation was not suggested by oximetry.    Sleep Associated Hypoxemia - (Greater than 5 minutes O2 sat at or below 88%) was not present. Baseline oxygen saturation was 96.1%. Lowest oxygen saturation was 81.9%. Time spent less than or equal to 88% was 0 minutes. Time spent less than or equal to 89% was 0 minutes.    Movement Activity: There were no significant limb movements .  Periodic Limb Activity - There were 27 PLMs during the entire study. The PLM index was 4.0 movements per hour. The PLM Arousal Index was 0.4 per hour.    REM EMG Activity - Excessive transient/sustained muscle activity was not present.    Nocturnal Behavior - Abnormal sleep related behaviors were not noted during/arising out of NREM / REM sleep.    Bruxism - None apparent.    Cardiac Summary: Normal sinus rhythm was seen   The average pulse rate was 59.8 bpm. The minimum pulse rate was 50.7 bpm while the maximum pulse rate was 83.2 bpm. Arrhythmias were not noted.    Assessment:     Optimum CPAP titration was achieved with CPAP at 6 cm water.    All sleep stages seen with increased stages N3 and REM sleep.    There were no significant limb movements.     Normal sinus rhythm was seen.    Recommendations:    Treatment of NANY with CPAP at 6 cmH2O. Recommend clinical follow up with sleep management team, for coaching and review of effectiveness and compliance measures.    Advice regarding the risks of drowsy driving.    Suggest optimizing sleep schedule and  avoiding sleep deprivation.    Weight management (if BMI > 30).    Pharmacologic therapy should be used for management of restless legs syndrome only if present and clinically indicated and not based on the presence of periodic limb movements alone.    Diagnostic Codes:   Obstructive Sleep Apnea G47.33    12/2/2019 [place] Titration Sleep Study (165.0 lbs) - Treatment was titrated to a pressure of CPAP 6 with an AHI of 2.1. Time Spent in REM supine at this pressure was 66.5.      _____________________________________   Electronically Signed By: (Gissell Machado MD), 12/8/2019

## 2019-12-09 ENCOUNTER — HEALTH MAINTENANCE LETTER (OUTPATIENT)
Age: 54
End: 2019-12-09

## 2019-12-09 LAB — SLPCOMP: NORMAL

## 2019-12-16 ENCOUNTER — DOCUMENTATION ONLY (OUTPATIENT)
Dept: SLEEP MEDICINE | Facility: CLINIC | Age: 54
End: 2019-12-16

## 2019-12-16 NOTE — PROGRESS NOTES
Patient was offered choice of vendor and chose [facility] includes the [facility].  Patient Katharina Rodríguez was set up at [facility] on December 16, 2019. Patient received a Resmed AirSense 10 Auto. Pressures were set at 6 cm H2O.   Patient s ramp is 5 cm H2O for Auto and FLEX/EPR is EPR, 2.  Patient received a [employer] Mask name: Eson 2  Nasal mask size Small, heated tubing and heated humidifier.  Patient is enrolled in the STM Program and does need to meet compliance. Patient has a follow up on 2/12/20 with Dr. Wall.    INA REYES

## 2019-12-19 ENCOUNTER — DOCUMENTATION ONLY (OUTPATIENT)
Dept: SLEEP MEDICINE | Facility: CLINIC | Age: 54
End: 2019-12-19

## 2019-12-19 NOTE — PROGRESS NOTES
3 DAY STM VISIT    Diagnostic AHI:   41.9 HST    Patient contacted for 3 day STM visit  Message left for patient to return call    Replacement device: No  STM ordered by provider: Yes     Device type: Auto-CPAP  PAP settings from order::  CPAP min 6 cm  H20       CPAP max 6 cm  H20         Mask type:    Nasal Mask     Device settings from machine             CPAP fixed 6.0 cm  H20  Assessment: Nightly usage over four hours.  Action plan: Patient to have 14 day STM visit. Patient has a follow up visit scheduled:   yes within 31-90 days of set up.    Total time spent on accessing, reviewing and interpreting remote patient PAP therapy data:   10 minutes      Total time spent with direct patient communication :   1 minutes

## 2019-12-27 NOTE — PROGRESS NOTES
Patient called and left a message for virtual care coordinator.  Coordinator called back and left another message requesting a call back again from patient.       VM states issues with mask fit.  Pt was directed to contact.

## 2020-01-02 ENCOUNTER — DOCUMENTATION ONLY (OUTPATIENT)
Dept: SLEEP MEDICINE | Facility: CLINIC | Age: 55
End: 2020-01-02

## 2020-01-02 NOTE — PROGRESS NOTES
14  DAY STM VISIT    Diagnostic AHI:   41.9 HST    Message left for patient to return call     Assessment: Pt not meeting objective benchmarks for compliance       Action plan: waiting for patient to return call.  and pt to have 30 day STM visit.      Device type: Auto-CPAP    PAP settings:       CPAP fixed 6.0 cm  H20           Mask type:  Nasal Mask    Objective measures: 14 day rolling measures      Compliance  50 %      Leak  4.64 lpm  last  upload      AHI 2.61   last  upload      Average number of minutes 232      Objective measure goal  Compliance   Goal >70%  Leak   Goal < 24 lpm  AHI  Goal < 5  Usage  Goal >240      Total time spent on accessing, reviewing and interpreting remote patient PAP therapy data:   10 minutes      Total time spent with direct patient communication :   1 minutes

## 2020-01-17 ENCOUNTER — DOCUMENTATION ONLY (OUTPATIENT)
Dept: SLEEP MEDICINE | Facility: CLINIC | Age: 55
End: 2020-01-17

## 2020-01-17 NOTE — PROGRESS NOTES
30 DAY STM VISIT    Diagnostic AHI:   41.9 HST    Message left for patient to return call     Assessment: Pt meeting objective benchmarks.     Action plan: waiting for patient to return call.   Patient has scheduled a follow up visit with Dr. Wall.  Device type: Auto-CPAP  PAP settings:     CPAP fixed 6.0 cm  H20      Mask type:  Nasal Mask  Objective measures: 14 day rolling measures      Compliance  85 %      Leak  2.96 lpm  last  upload      AHI 2.07   last  upload      Average number of minutes 348      Objective measure goal  Compliance   Goal >70%  Leak   Goal < 24 lpm  AHI  Goal < 5  Usage  Goal >240      Total time spent on accessing, reviewing and interpreting remote patient PAP therapy data:   10 minutes      Total time spent with direct patient communication :   1 minutes    Total time spent on remote patient monitoring analysis for last 30 days:   30 min

## 2020-02-12 ENCOUNTER — OFFICE VISIT (OUTPATIENT)
Dept: SLEEP MEDICINE | Facility: CLINIC | Age: 55
End: 2020-02-12
Payer: COMMERCIAL

## 2020-02-12 VITALS
WEIGHT: 169 LBS | SYSTOLIC BLOOD PRESSURE: 141 MMHG | OXYGEN SATURATION: 96 % | RESPIRATION RATE: 14 BRPM | HEIGHT: 67 IN | HEART RATE: 66 BPM | BODY MASS INDEX: 26.53 KG/M2 | DIASTOLIC BLOOD PRESSURE: 86 MMHG

## 2020-02-12 DIAGNOSIS — G47.33 OSA ON CPAP: Primary | ICD-10-CM

## 2020-02-12 PROCEDURE — 99214 OFFICE O/P EST MOD 30 MIN: CPT | Performed by: INTERNAL MEDICINE

## 2020-02-12 ASSESSMENT — MIFFLIN-ST. JEOR: SCORE: 1392.46

## 2020-02-12 NOTE — NURSING NOTE
"Chief Complaint   Patient presents with     RECHECK     Follow up anamaria.       Initial BP (!) 141/86   Pulse 66   Resp 14   Ht 1.707 m (5' 7.21\")   Wt 76.7 kg (169 lb)   LMP 05/01/2015 (Approximate)   SpO2 96%   BMI 26.31 kg/m   Estimated body mass index is 26.31 kg/m  as calculated from the following:    Height as of this encounter: 1.707 m (5' 7.21\").    Weight as of this encounter: 76.7 kg (169 lb).    Medication Reconciliation: complete    ESS 6    Tarah Olson MA    "

## 2020-02-12 NOTE — PATIENT INSTRUCTIONS
Your BMI is Body mass index is 26.31 kg/m .  Weight management is a personal decision.  If you are interested in exploring weight loss strategies, the following discussion covers the approaches that may be successful. Body mass index (BMI) is one way to tell whether you are at a healthy weight, overweight, or obese. It measures your weight in relation to your height.  A BMI of 18.5 to 24.9 is in the healthy range. A person with a BMI of 25 to 29.9 is considered overweight, and someone with a BMI of 30 or greater is considered obese. More than two-thirds of American adults are considered overweight or obese.  Being overweight or obese increases the risk for further weight gain. Excess weight may lead to heart disease and diabetes.  Creating and following plans for healthy eating and physical activity may help you improve your health.  Weight control is part of healthy lifestyle and includes exercise, emotional health, and healthy eating habits. Careful eating habits lifelong are the mainstay of weight control. Though there are significant health benefits from weight loss, long-term weight loss with diet alone may be very difficult to achieve- studies show long-term success with dietary management in less than 10% of people. Attaining a healthy weight may be especially difficult to achieve in those with severe obesity. In some cases, medications, devices and surgical management might be considered.  What can you do?  If you are overweight or obese and are interested in methods for weight loss, you should discuss this with your provider.     Consider reducing daily calorie intake by 500 calories.     Keep a food journal.     Avoiding skipping meals, consider cutting portions instead.    Diet combined with exercise helps maintain muscle while optimizing fat loss. Strength training is particularly important for building and maintaining muscle mass. Exercise helps reduce stress, increase energy, and improves fitness.  Increasing exercise without diet control, however, may not burn enough calories to loose weight.       Start walking three days a week 10-20 minutes at a time    Work towards walking thirty minutes five days a week     Eventually, increase the speed of your walking for 1-2 minutes at time    In addition, we recommend that you review healthy lifestyles and methods for weight loss available through the National Institutes of Health patient information sites:  http://win.niddk.nih.gov/publications/index.htm    And look into health and wellness programs that may be available through your health insurance provider, employer, local community center, or olive club.    Weight management plan: Patient was referred to their PCP to discuss a diet and exercise plan.

## 2020-02-12 NOTE — PROGRESS NOTES
"SLEEP CLINIC FOLLOW UP VISIT:     Date of visit: February 12, 2020     Purpose of visit: Follow-up for NANY, review CPAP compliance measures     History of present illness: Katharina Rodríguez is a 54-year-old female who was recently diagnosed with severe obstructive sleep apnea and sleep associated hypoxemia during home sleep study obtained on 11/19/2019.  She subsequently underwent supervised polysomnography with CPAP titration on 12/2/2109 and optimal titration was achieved with CPAP at 6 cmH2O along with resolution of hypoxemia.    She  was set up at [facility] on December 16, 2019. Patient received a Resmed AirSense 10 Auto with pressures were set at 6 cm H2O.   Patient is  being followed through the [facility] and does need to meet compliance.     She is currently being treated with CPAP at fixed pressure setting at 6 cm water.  She has been using a nasal mask.  She reports occasional removal of the mask particularly when she has nasal congestion.  She has been using the heated humidity option with the CPAP.  She denies reports of snoring or awakenings due to gasping for air or choking with the CPAP.  She reports better sleep quality with the CPAP use and reports positive benefits.  She reports improvement in energy levels and denies excessive daytime sleepiness with CPAP use.  She endorses an [place] sleepiness score of 6 out of 24.  She denies any drowsiness while driving.    DOWNLOADABLE COMPLIANCE DATA:   From 12/28/2019 through 1/26/2020 reveals that she used the device for 30 out of 30 days with an averag use of 5.19 hours on the days used. 95th percentile on leak was 3.0 L/min  Residual AHI was 2.0/h.      Current meds, Past medical history, Past surgical history, Allergies, Social history, Family history: reviewed, per EMR    Exam:  BP (!) 141/86   Pulse 66   Resp 14   Ht 1.707 m (5' 7.21\")   Wt 76.7 kg (169 lb)   LMP 05/01/2015 (Approximate)   SpO2 96%   BMI 26.31 kg/m    General " "appearance:  in no apparent distress  Pt is dressed casually, cooperative with good eye contact.   NOSE: nasal turbinate hypertrophy was noted, but patent nares.  Speech is spontaneous with regular rate and volume.   Mood: euthymic; affect congruent with full range and intensity.   Sensorium: awake, alert and oriented to person, place, time, and situation.    Assessment/plan:   Severe obstructive sleep apnea: Patient reports adequate compliance with CPAP treatment and reports positive benefits with device usage.  Based on the compliance measures NANY is well controlled with CPAP at the current pressure setting.   For the symptoms of the nasal congestion I have encouraged her to continue using the heated humidity with the CPAP,  adjust the humidity settings as needed. Recommended Flonase nasal spray 1 spray in each nostril once daily for   6 weeks and  after 6 weeks if there is no improvement in the nasal congestion then she was instructed to discontinue the nasal spray and get back to me via My Chart, in which case I will consider referral to sleep ENT provider for further evaluation.     Recommended her to continue using the CPAP device regularly during sleep and use it during the entire sleep duration. She was  instructed her to get the supplies for the device replaced regularly.      Encouraged healthy diet and exercise.      She was instructed to avoid driving if drowsy or sleepy to prevent accidents.      She will follow-up with the sleep clinic in 1 to 2 years or sooner if any concerns.    The above note was dictated using voice recognition software. Although reviewed after completion, some word and grammatical error may remain . Please contact the author for any clarifications.    \"I spent a total of 25 minutes face to face with Katharina Rodríguez during today's office visit. Over 50% of this time was spent counseling the patient and  coordinating care regarding NANY, CPAP treatment, reviewing CPAP " "compliance download, nasal congestion, review of previous home sleep study and PSG.\"     Jefferson Wall MD   of Medicine,  Division of Pulmonary/Sleep Medicine  [facility].    "

## 2020-02-13 ENCOUNTER — OFFICE VISIT (OUTPATIENT)
Dept: FAMILY MEDICINE | Facility: CLINIC | Age: 55
End: 2020-02-13
Payer: COMMERCIAL

## 2020-02-13 VITALS
RESPIRATION RATE: 16 BRPM | DIASTOLIC BLOOD PRESSURE: 86 MMHG | WEIGHT: 169.1 LBS | TEMPERATURE: 98.1 F | HEART RATE: 77 BPM | SYSTOLIC BLOOD PRESSURE: 146 MMHG | BODY MASS INDEX: 26.54 KG/M2 | HEIGHT: 67 IN | OXYGEN SATURATION: 98 %

## 2020-02-13 DIAGNOSIS — I47.10 SVT (SUPRAVENTRICULAR TACHYCARDIA) (H): ICD-10-CM

## 2020-02-13 DIAGNOSIS — Z00.00 ROUTINE GENERAL MEDICAL EXAMINATION AT A HEALTH CARE FACILITY: Primary | ICD-10-CM

## 2020-02-13 DIAGNOSIS — I10 BENIGN HYPERTENSION: ICD-10-CM

## 2020-02-13 DIAGNOSIS — Z12.31 VISIT FOR SCREENING MAMMOGRAM: ICD-10-CM

## 2020-02-13 PROCEDURE — 99213 OFFICE O/P EST LOW 20 MIN: CPT | Mod: 25 | Performed by: INTERNAL MEDICINE

## 2020-02-13 PROCEDURE — 99396 PREV VISIT EST AGE 40-64: CPT | Performed by: INTERNAL MEDICINE

## 2020-02-13 RX ORDER — ATENOLOL 50 MG/1
50 TABLET ORAL DAILY
Qty: 90 TABLET | Refills: 3 | Status: SHIPPED | OUTPATIENT
Start: 2020-02-13 | End: 2021-02-16

## 2020-02-13 RX ORDER — ATENOLOL 25 MG/1
25 TABLET ORAL DAILY
Qty: 90 TABLET | Refills: 3 | Status: SHIPPED | OUTPATIENT
Start: 2020-02-13 | End: 2020-02-13 | Stop reason: DRUGHIGH

## 2020-02-13 ASSESSMENT — ENCOUNTER SYMPTOMS
FEVER: 0
CHILLS: 0
PALPITATIONS: 0
HEADACHES: 0
DIZZINESS: 0
NERVOUS/ANXIOUS: 0
HEARTBURN: 1
PARESTHESIAS: 0
DIARRHEA: 0
WEAKNESS: 0
EYE PAIN: 0
DYSURIA: 0
COUGH: 0
HEMATURIA: 0
ARTHRALGIAS: 1
SHORTNESS OF BREATH: 0
SORE THROAT: 0
MYALGIAS: 1
HEMATOCHEZIA: 0
NAUSEA: 0
CONSTIPATION: 0
FREQUENCY: 0
JOINT SWELLING: 0
BREAST MASS: 0
ABDOMINAL PAIN: 0

## 2020-02-13 ASSESSMENT — MIFFLIN-ST. JEOR: SCORE: 1393.62

## 2020-02-13 NOTE — PROGRESS NOTES
Chief Complaint   Patient presents with     Physical        SUBJECTIVE:   CC: Katharina Rodríguez is an 54 year old woman who presents for preventive health visit.     Healthy Habits:     Getting at least 3 servings of Calcium per day:  Yes    Bi-annual eye exam:  NO    Dental care twice a year:  Yes    Sleep apnea or symptoms of sleep apnea:  Sleep apnea    Diet:  Regular (no restrictions)    Frequency of exercise:  2-3 days/week    Duration of exercise:  15-30 minutes    Taking medications regularly:  Yes    Medication side effects:  None    PHQ-2 Total Score: 0    Additional concerns today:  No        Hypertension Follow-up      Do you check your blood pressure regularly outside of the clinic? No  But has felt like her BLOOD PRESSURE has been running high    Are you following a low salt diet? Yes    Are your blood pressures ever more than 140 on the top number (systolic) OR more   than 90 on the bottom number (diastolic), for example 140/90? N/A      Today's PHQ-2 Score:   PHQ-2 ( 1999 Pfizer) 2/13/2020   Q1: Little interest or pleasure in doing things 0   Q2: Feeling down, depressed or hopeless 0   PHQ-2 Score 0   Q1: Little interest or pleasure in doing things Not at all   Q2: Feeling down, depressed or hopeless Not at all   PHQ-2 Score 0       Abuse: Current or Past(Physical, Sexual or Emotional)- No  Do you feel safe in your environment? Yes        Social History     Tobacco Use     Smoking status: Never Smoker     Smokeless tobacco: Never Used   Substance Use Topics     Alcohol use: Yes     Comment: social      If you drink alcohol do you typically have >3 drinks per day or >7 drinks per week? No    Alcohol Use 2/13/2020   Prescreen: >3 drinks/day or >7 drinks/week? No   Prescreen: >3 drinks/day or >7 drinks/week? -       Reviewed orders with patient.  Reviewed health maintenance and updated orders accordingly - Yes  Labs reviewed in EPIC  BP Readings from Last 3 Encounters:   02/13/20 (!) 146/86    02/12/20 (!) 141/86   10/16/19 119/75    Wt Readings from Last 3 Encounters:   02/13/20 76.7 kg (169 lb 1.6 oz)   02/12/20 76.7 kg (169 lb)   10/16/19 74.9 kg (165 lb 3.2 oz)                  Patient Active Problem List   Diagnosis     Benign hypertension     Osteoarthritis of thumb     Ganglion cyst     Cervical high risk HPV (human papillomavirus) test positive     Bunion, right     Past Surgical History:   Procedure Laterality Date     COLONOSCOPY N/A 11/18/2015    Procedure: COLONOSCOPY;  Surgeon: Kole Owen MD;  Location:  GI     NONE OF THE ABOVE CORE MEASURE DIAGNOSES         Social History     Tobacco Use     Smoking status: Never Smoker     Smokeless tobacco: Never Used   Substance Use Topics     Alcohol use: Yes     Comment: social      Family History   Problem Relation Age of Onset     Hypertension Father      C.A.D. Father      Heart Disease Father      Psychotic Disorder Sister         bi polar     Neurologic Disorder Daughter         parkinsons         Current Outpatient Medications   Medication Sig Dispense Refill     atenolol (TENORMIN) 25 MG tablet Take 1 tablet (25 mg) by mouth daily 90 tablet 3     lactobacillus rhamnosus, GG, (CULTURELL) capsule Take 1 capsule by mouth 2 times daily       Multiple Vitamin (MULTIVITAMIN OR) Take 1 capsule by mouth daily.       Vitamin D, Cholecalciferol, 1000 units CAPS Take 1 capsule by mouth       No Known Allergies  Recent Labs   Lab Test 08/17/17  0756 04/09/15  0754 07/18/12  1217   * 122 121   HDL 71 60 54   TRIG 80 111 119   ALT 27 18 13   CR 0.92 0.94 0.81   GFRESTIMATED 64 63 75   GFRESTBLACK 77 76 >90   POTASSIUM 4.1 4.5 4.5   TSH  --   --  0.72        Mammogram Screening: Patient over age 50, mutual decision to screen reflected in health maintenance.    Pertinent mammograms are reviewed under the imaging tab.  History of abnormal Pap smear: NO - age 30- 65 PAP every 3 years recommended  PAP / HPV Latest Ref Rng & Units 1/10/2018  "11/9/2016 11/4/2015   PAP - NIL NIL -   HPV 16 DNA NEG:Negative Negative Negative Negative   HPV 18 DNA NEG:Negative Negative Negative Negative   OTHER HR HPV NEG:Negative Negative Positive(A) Positive(A)     Reviewed and updated as needed this visit by clinical staff  Tobacco  Allergies  Meds  Med Hx  Surg Hx  Fam Hx  Soc Hx        Reviewed and updated as needed this visit by Provider        Past Medical History:   Diagnosis Date     Cervical high risk HPV (human papillomavirus) test positive 11/2015,11/09/16    + HPV (not 16 or 18)     History of colposcopy with cervical biopsy 02/07/2017 02/07/17: [facility] Bx's LSIL DEVYN 1.      Hypertension       Past Surgical History:   Procedure Laterality Date     COLONOSCOPY N/A 11/18/2015    Procedure: COLONOSCOPY;  Surgeon: Kole Owen MD;  Location:  GI     NONE OF THE ABOVE CORE MEASURE DIAGNOSES         Review of Systems   Constitutional: Negative for chills and fever.   HENT: Negative for congestion, ear pain, hearing loss and sore throat.    Eyes: Negative for pain and visual disturbance.   Respiratory: Negative for cough and shortness of breath.    Cardiovascular: Negative for chest pain, palpitations and peripheral edema.   Gastrointestinal: Positive for heartburn. Negative for abdominal pain, constipation, diarrhea, hematochezia and nausea.   Breasts:  Negative for tenderness, breast mass and discharge.   Genitourinary: Negative for dysuria, frequency, genital sores, hematuria, pelvic pain, urgency, vaginal bleeding and vaginal discharge.   Musculoskeletal: Positive for arthralgias and myalgias. Negative for joint swelling.   Skin: Negative for rash.   Neurological: Negative for dizziness, weakness, headaches and paresthesias.   Psychiatric/Behavioral: Negative for mood changes. The patient is not nervous/anxious.           OBJECTIVE:   BP (!) 144/80   Pulse 77   Temp 98.1  F (36.7  C) (Oral)   Resp 16   Ht 1.708 m (5' 7.25\")   Wt 76.7 kg (169 lb " 1.6 oz)   LMP 05/01/2015 (Approximate)   SpO2 98%   BMI 26.29 kg/m    Physical Exam  GENERAL: healthy, alert and no distress  EYES: Eyes grossly normal to inspection, PERRL and conjunctivae and sclerae normal  HENT: ear canals and TM's normal, nose and mouth without ulcers or lesions  NECK: no adenopathy, no asymmetry, masses, or scars and thyroid normal to palpation  RESP: lungs clear to auscultation - no rales, rhonchi or wheezes  Breasts: normal without suspicious masses, skin changes or axillary nodes, symmetric fibrous changes in both upper outer quadrants.    CV: regular rate and rhythm, normal S1 S2, no S3 or S4, no murmur, click or rub, no peripheral edema and peripheral pulses strong  ABDOMEN: soft, nontender, no hepatosplenomegaly, no masses and bowel sounds normal  MS: normal muscle tone, normal range of motion and spine exam shows ROM is normal but right shoulder appears slightly higher than the left.   NEURO: Normal strength and tone, sensory exam grossly normal, mentation intact, gait normal including heel/toe/tandem walking and Romberg normal  PSYCH: mentation appears normal, affect normal/bright    Diagnostic Test Results:  Labs reviewed in Epic    ASSESSMENT/PLAN:   (Z00.00) Routine general medical examination at a health care facility  (primary encounter diagnosis)  Comment: HEALTH CARE MAINTENANCE reviewed;  She continues to work on posture and body mechanics.   Plan:     (I10) Benign hypertension  Comment: du to elevated BLOOD PRESSURE and intermittent ectopic beats reported by pt, recommend increasing Atenolol from 25 mg to 50 mg; sent in new Rx and recommend nurse only BLOOD PRESSURE check in 2 weeks to reassess BLOOD PRESSURE on new dose.   Plan: atenolol (TENORMIN) 50 MG tablet, DISCONTINUED:        atenolol (TENORMIN) 25 MG tablet          (I47.1) SVT (supraventricular tachycardia) (H)  Comment: intermittent; anticipate increased dose of BB should help with lessening ectopy  Plan:  "atenolol (TENORMIN) 50 MG tablet, DISCONTINUED:        atenolol (TENORMIN) 25 MG tablet          (Z12.31) Visit for screening mammogram  Comment: Clinical Breast Exam   Plan: MA Screening Digital Bilateral            COUNSELING:  Reviewed preventive health counseling, as reflected in patient instructions       Regular exercise       Healthy diet/nutrition    Estimated body mass index is 26.29 kg/m  as calculated from the following:    Height as of this encounter: 1.708 m (5' 7.25\").    Weight as of this encounter: 76.7 kg (169 lb 1.6 oz).    Weight management plan: Discussed healthy diet and exercise guidelines     reports that she has never smoked. She has never used smokeless tobacco.      Counseling Resources:  ATP IV Guidelines  Pooled Cohorts Equation Calculator  Breast Cancer Risk Calculator  FRAX Risk Assessment  ICSI Preventive Guidelines  Dietary Guidelines for Americans, 2010  USDA's MyPlate  ASA Prophylaxis  Lung CA Screening    Elenita Keane MD  Internal Medicine   [facility] [place]  15 minutes in addition to HEALTH CARE MAINTENANCE are spent with patient, over 50% of that time spent providing counselling, discussing and reviewing medical conditions/concerns, meds and potential side effects.   "

## 2020-03-15 ENCOUNTER — HEALTH MAINTENANCE LETTER (OUTPATIENT)
Age: 55
End: 2020-03-15

## 2020-07-29 ENCOUNTER — ALLIED HEALTH/NURSE VISIT (OUTPATIENT)
Dept: NURSING | Facility: CLINIC | Age: 55
End: 2020-07-29
Payer: COMMERCIAL

## 2020-07-29 VITALS — DIASTOLIC BLOOD PRESSURE: 68 MMHG | SYSTOLIC BLOOD PRESSURE: 138 MMHG

## 2020-07-29 DIAGNOSIS — I10 BENIGN HYPERTENSION: Primary | ICD-10-CM

## 2020-07-29 PROCEDURE — 99207 ZZC NO CHARGE NURSE ONLY: CPT

## 2020-07-29 NOTE — PROGRESS NOTES
Katharina Rodríguez is a 54 year old patient who comes in today for a Blood Pressure check.  Initial BP:  /68 (BP Location: Right arm, Patient Position: Chair, Cuff Size: Adult Large)   LMP 05/01/2015 (Approximate)      Data Unavailable  Disposition: results routed to provider    Patient notes she has been feeling a bit stressed with COVID-19 and moving.  Says she has taken BP a couple times at home and sometimes results were around 150-160 which she thought was high (she wonders if her machine is working well).    Notes meds were increased in February (taking 50 mg atenolol daily).  No side effects or concerns.  BP not high today in clinic, advised to make another nurse only if she feels her BP is high again and to bring home BP cuff to compare.    Routing to PCP as ALFREDO Coley CMA ([facility])

## 2020-10-26 ENCOUNTER — OFFICE VISIT (OUTPATIENT)
Dept: FAMILY MEDICINE | Facility: CLINIC | Age: 55
End: 2020-10-26
Payer: COMMERCIAL

## 2020-10-26 VITALS
HEART RATE: 85 BPM | HEIGHT: 67 IN | DIASTOLIC BLOOD PRESSURE: 92 MMHG | OXYGEN SATURATION: 96 % | SYSTOLIC BLOOD PRESSURE: 158 MMHG | WEIGHT: 180.3 LBS | TEMPERATURE: 98 F | BODY MASS INDEX: 28.3 KG/M2 | RESPIRATION RATE: 16 BRPM

## 2020-10-26 DIAGNOSIS — M62.830 BACK MUSCLE SPASM: ICD-10-CM

## 2020-10-26 DIAGNOSIS — R35.0 URINARY FREQUENCY: Primary | ICD-10-CM

## 2020-10-26 DIAGNOSIS — R10.9 FLANK PAIN: ICD-10-CM

## 2020-10-26 DIAGNOSIS — N23 RENAL COLIC: ICD-10-CM

## 2020-10-26 LAB
ALBUMIN UR-MCNC: NEGATIVE MG/DL
APPEARANCE UR: CLEAR
BILIRUB UR QL STRIP: NEGATIVE
COLOR UR AUTO: YELLOW
GLUCOSE UR STRIP-MCNC: NEGATIVE MG/DL
HGB UR QL STRIP: NEGATIVE
KETONES UR STRIP-MCNC: NEGATIVE MG/DL
LEUKOCYTE ESTERASE UR QL STRIP: NEGATIVE
NITRATE UR QL: NEGATIVE
PH UR STRIP: 6.5 PH (ref 5–7)
SOURCE: NORMAL
SP GR UR STRIP: 1.02 (ref 1–1.03)
UROBILINOGEN UR STRIP-ACNC: 0.2 EU/DL (ref 0.2–1)

## 2020-10-26 PROCEDURE — 99214 OFFICE O/P EST MOD 30 MIN: CPT | Performed by: FAMILY MEDICINE

## 2020-10-26 PROCEDURE — 81003 URINALYSIS AUTO W/O SCOPE: CPT | Performed by: FAMILY MEDICINE

## 2020-10-26 RX ORDER — BACLOFEN 10 MG/1
5-10 TABLET ORAL 3 TIMES DAILY
Qty: 21 TABLET | Refills: 0 | Status: SHIPPED | OUTPATIENT
Start: 2020-10-26 | End: 2020-11-09

## 2020-10-26 RX ORDER — HYDROCODONE BITARTRATE AND ACETAMINOPHEN 5; 325 MG/1; MG/1
1 TABLET ORAL 4 TIMES DAILY PRN
Qty: 16 TABLET | Refills: 0 | Status: SHIPPED | OUTPATIENT
Start: 2020-10-26 | End: 2020-10-30

## 2020-10-26 ASSESSMENT — MIFFLIN-ST. JEOR: SCORE: 1439.43

## 2020-10-26 NOTE — PROGRESS NOTES
Subjective     Katharina Rodríguez is a 55 year old female who presents to clinic today for the following health issues:    HPI         Genitourinary - Female  Onset/Duration: x 1 day   Description:   Painful urination (Dysuria): no           Frequency: YES  Blood in urine (Hematuria): no  Delay in urine (Hesitency): no  Intensity: severe  Progression of Symptoms:  same  Accompanying Signs & Symptoms:  Fever/chills: no  Flank pain: YES  Nausea and vomiting: YES-nausea  Vaginal symptoms: none  Abdominal/Pelvic Pain: YES-pelvic area   History:   History of frequent UTI s: no  History of kidney stones: no  Sexually Active: no  Possibility of pregnancy: No  Precipitating or alleviating factors: None  Therapies tried and outcome: OTC advil or tylenol - not effective         Developed  Fairly sudden onset severe crampy right  cva tenderness and just below that area.  Comes in waves.  Disabling.   In between little pain.  Some urinary urgency/frequency but no dysuria  No fevers, no blood in urine  Slight nausea with pain but not in between.  Not associated with eating . No vaginal discharge, itching/burning or blood.     Has had back pain ion past but not like this.        Problem list, Medication list, Allergies, and Medical/Social/Surgical histories reviewed in [facility] and updated as appropriate.  Labs reviewed in EPIC  BP Readings from Last 3 Encounters:   10/26/20 (!) 158/92   07/29/20 138/68   02/13/20 (!) 146/86    Wt Readings from Last 3 Encounters:   10/26/20 81.8 kg (180 lb 4.8 oz)   02/13/20 76.7 kg (169 lb 1.6 oz)   02/12/20 76.7 kg (169 lb)                  Patient Active Problem List   Diagnosis     Benign hypertension     Osteoarthritis of thumb     Ganglion cyst     Cervical high risk HPV (human papillomavirus) test positive     Bunion, right     Past Surgical History:   Procedure Laterality Date     COLONOSCOPY N/A 11/18/2015    Procedure: COLONOSCOPY;  Surgeon: Kole Owen MD;  Location: [facility]      "NONE OF THE ABOVE CORE MEASURE DIAGNOSES         Social History     Tobacco Use     Smoking status: Never Smoker     Smokeless tobacco: Never Used   Substance Use Topics     Alcohol use: Yes     Comment: social      Family History   Problem Relation Age of Onset     Hypertension Father      C.A.D. Father      Heart Disease Father      Psychotic Disorder Sister         bi polar     Neurologic Disorder Daughter         parkinsons         Current Outpatient Medications   Medication Sig Dispense Refill     atenolol (TENORMIN) 50 MG tablet Take 1 tablet (50 mg) by mouth daily 90 tablet 3     baclofen (LIORESAL) 10 MG tablet Take 0.5-1 tablets (5-10 mg) by mouth 3 times daily 21 tablet 0     HYDROcodone-acetaminophen (NORCO) 5-325 MG tablet Take 1 tablet by mouth 4 times daily as needed for pain 16 tablet 0     lactobacillus rhamnosus, GG, (CULTURELL) capsule Take 1 capsule by mouth 2 times daily       Multiple Vitamin (MULTIVITAMIN OR) Take 1 capsule by mouth daily.       Vitamin D, Cholecalciferol, 1000 units CAPS Take 1 capsule by mouth       No Known Allergies  Recent Labs   Lab Test 08/17/17  0756 04/09/15  0754   * 122   HDL 71 60   TRIG 80 111   ALT 27 18   CR 0.92 0.94   GFRESTIMATED 64 63   GFRESTBLACK 77 76   POTASSIUM 4.1 4.5        ROS:  Constitutional, HEENT, cardiovascular, pulmonary, GI, , musculoskeletal, neuro, skin, endocrine and psych systems are negative, except as otherwise noted.        OBJECTIVE:  BP (!) 158/92 (BP Location: Right arm, Cuff Size: Adult Regular)   Pulse 85   Temp 98  F (36.7  C) (Oral)   Resp 16   Ht 1.708 m (5' 7.25\")   Wt 81.8 kg (180 lb 4.8 oz)   LMP 05/01/2015 (Approximate)   SpO2 96%   BMI 28.03 kg/m      EXAM:  GENERAL APPEARANCE: healthy, alert and no distress  RESP: lungs clear to auscultation - no rales, rhonchi or wheezes  CV: regular rates and rhythm, normal S1 S2, no S3 or S4 and no murmur, click or rub -  ABDOMEN:  soft, nontender, no HSM or masses and " bowel sounds normal    ua  Normal, no blood     No spinal pain with palpation.     No pain with palpation of bilateral paralumbar muscles.     Results for orders placed or performed in visit on 10/26/20   *UA reflex to Microscopic and Culture ([place] [facility] (except [place] and [place])     Status: None    Specimen: Midstream Urine   Result Value Ref Range    Color Urine Yellow     Appearance Urine Clear     Glucose Urine Negative NEG^Negative mg/dL    Bilirubin Urine Negative NEG^Negative    Ketones Urine Negative NEG^Negative mg/dL    Specific Gravity Urine 1.025 1.003 - 1.035    Blood Urine Negative NEG^Negative    pH Urine 6.5 5.0 - 7.0 pH    Protein Albumin Urine Negative NEG^Negative mg/dL    Urobilinogen Urine 0.2 0.2 - 1.0 EU/dL    Nitrite Urine Negative NEG^Negative    Leukocyte Esterase Urine Negative NEG^Negative    Source Midstream Urine        ASSESSMENT AND PLAN  Patient Instructions   Very likely renal colic based on symptoms, almost nothing else it could be.     Discussed possible mimicking conditions and what to look for (uti, kidney infection, appendicitis, ovarian cyst, gallstones).     Plan:    Use vicodin up to four times daily.     If this is not helpful then can try baclofen (be aware both can be sedating so spread out as much as possible.)     Obtain ct eval for kidney stone to help guide therapy/predict future.     Strainer given for urine to try to obtain stone for analysis.     Follow up for nurse blood pressue check in 2-3 weeks when better.     Work note given (works as massage therapist and ).       ASSESSMENT AND PLAN  1. Urinary frequency    - *UA reflex to Microscopic and Culture ([facility] (except [place] and [place])    2. Flank pain    - *UA reflex to Microscopic and Culture ([facility] (except [place] and [place])  - CT Chest Abdomen Pelvis w/o Contrast; Future    3. Renal colic    -  "HYDROcodone-acetaminophen (NORCO) 5-325 MG tablet; Take 1 tablet by mouth 4 times daily as needed for pain  Dispense: 16 tablet; Refill: 0  - CT Chest Abdomen Pelvis w/o Contrast; Future    4. Back muscle spasm    - HYDROcodone-acetaminophen (NORCO) 5-325 MG tablet; Take 1 tablet by mouth 4 times daily as needed for pain  Dispense: 16 tablet; Refill: 0  - baclofen (LIORESAL) 10 MG tablet; Take 0.5-1 tablets (5-10 mg) by mouth 3 times daily  Dispense: 21 tablet; Refill: 0        MYCHART SIGN UP: [email or web address] , 1-450.662.3494    E-VISITS CAN BE DONE FOR CARE/PRESCRIPTIONS WHICH MAY NOT NEED AN IN-PERSON ASSESSMENT - click \"on-line care, then request e-visit\".      ONCARE VISIT/PRESCRIPTIONS: [email or web address]  - we treat nearly 50 common conditions with one hour response time     RADIOLOGY SCHEDULING  [facility]:  [phone]   [facility]: [phone]  [facility]: [phone]    Mammogram and Colonoscopy [phone]    Smoking Cessation: www.quitplan.org, 6-395-504-PLAN (3659)    Pharmacy savings card application: www.[email or web address]    CONSUMER PRICE LINE for estimates of test costs:  [phone]             Joel Wegener, MD        "

## 2020-10-26 NOTE — LETTER
[facility]  [address] [facility] [id number] [phone]          October 26, 2020    RE:  Katharina Rodríguez                                                                                                                                                       [address] [place] [id number]            To whom it may concern:    Katharina Rodríguez is under my professional care for an acute illness.     Please excuse her from work today, tomorrow and Wednesday.  She may return to work on Thursday October 29. 2020.     Sincerely,        Joel Daniel Irwin Wegener MD

## 2020-10-26 NOTE — PATIENT INSTRUCTIONS
"Very likely renal colic based on symptoms, almost nothing else it could be.     Discussed possible mimicking conditions and what to look for (uti, kidney infection, appendicitis, ovarian cyst, gallstones).     Plan:    Use vicodin up to four times daily.     If this is not helpful then can try baclofen (be aware both can be sedating so spread out as much as possible.)     Obtain ct eval for kidney stone to help guide therapy/predict future.     Strainer given for urine to try to obtain stone for analysis.     Follow up for nurse blood pressue check in 2-3 weeks when better.     Work note given (works as massage therapist and ).         CITYBIZLIST SIGN UP: [email or web address] , 1-687.181.5689    E-VISITS CAN BE DONE FOR CARE/PRESCRIPTIONS WHICH MAY NOT NEED AN IN-PERSON ASSESSMENT - click \"on-line care, then request e-visit\".      ONCARE VISIT/PRESCRIPTIONS: [email or web address]://[email or web address].[email or web address]  - we treat nearly 50 common conditions with one hour response time     RADIOLOGY SCHEDULING  [facility]:  [phone]   [facility]: [phone]  [facility]: [phone]    Mammogram and Colonoscopy [phone]    Smoking Cessation: www.quitplan.org, 9-939-960-PLAN (5361)    Pharmacy savings card application: www.ConnectAndSell    CONSUMER PRICE LINE for estimates of test costs:  [phone]       "

## 2020-10-28 ENCOUNTER — TELEPHONE (OUTPATIENT)
Dept: FAMILY MEDICINE | Facility: CLINIC | Age: 55
End: 2020-10-28

## 2020-10-28 NOTE — TELEPHONE ENCOUNTER
Routing to Dr. Wegener, patient requesting work note to be off through tomorrow (10/29/20).  -Akila Torres

## 2020-10-28 NOTE — TELEPHONE ENCOUNTER
Reason for Call:  Other note for work    Detailed comments: Patient saw Dr Wegener on Oct 26 and has possible kidney stones. She got a note to be off work  Until today Oct 28.  She has a CT scan scheduled for this Friday.  She  Is requesting another work note to be off until this coming Friday.  She can pick it up.    Phone Number Patient can be reached at: Cell number on file:    Telephone Information:   Mobile [phone]       Best Time: any    Can we leave a detailed message on this number? YES    Call taken on 10/28/2020 at 9:50 AM by FRANCA CARUSO

## 2020-10-28 NOTE — LETTER
[facility]  [address] [facility] [address]  [phone]          October 30, 2020    RE:  Katharina Rodríguez                                                                                                                                                       [address] [place] [phone]            To whom it may concern:    I saw Ms. Katharina Rodríguez on 10/26/20 for an acute illness.      She should be off work until Nov 2, 2020.      She may return to work prior to that per her discretion.     Sincerely,   '        Joel Wegener,MD

## 2020-10-30 ENCOUNTER — HOSPITAL ENCOUNTER (OUTPATIENT)
Dept: CT IMAGING | Facility: CLINIC | Age: 55
Discharge: HOME | End: 2020-10-30
Attending: FAMILY MEDICINE | Admitting: FAMILY MEDICINE
Payer: COMMERCIAL

## 2020-10-30 DIAGNOSIS — N23 RENAL COLIC: ICD-10-CM

## 2020-10-30 DIAGNOSIS — R10.9 FLANK PAIN: ICD-10-CM

## 2020-10-30 PROCEDURE — 74176 CT ABD & PELVIS W/O CONTRAST: CPT

## 2020-10-30 NOTE — TELEPHONE ENCOUNTER
In nurse basket [facility], may need to be faxed since this is a rosemount pt.     Joel Wegener,MD

## 2020-10-30 NOTE — TELEPHONE ENCOUNTER
Faxed to Jose per Dr. Wegener's request.    Nohemi KERR  [facility]    Routing back to Akila Torres

## 2020-11-02 NOTE — TELEPHONE ENCOUNTER
LM advising that we have the letter for her, if she wants us to mail it we can or she can pick it up at the . Letter is at [facility] desk.  -Akila Torres

## 2020-11-09 ENCOUNTER — VIRTUAL VISIT (OUTPATIENT)
Dept: FAMILY MEDICINE | Facility: CLINIC | Age: 55
End: 2020-11-09
Payer: COMMERCIAL

## 2020-11-09 ENCOUNTER — ALLIED HEALTH/NURSE VISIT (OUTPATIENT)
Dept: NURSING | Facility: CLINIC | Age: 55
End: 2020-11-09
Payer: COMMERCIAL

## 2020-11-09 VITALS — HEART RATE: 66 BPM | DIASTOLIC BLOOD PRESSURE: 90 MMHG | SYSTOLIC BLOOD PRESSURE: 160 MMHG

## 2020-11-09 DIAGNOSIS — I10 BENIGN HYPERTENSION: Primary | ICD-10-CM

## 2020-11-09 DIAGNOSIS — I10 HTN (HYPERTENSION): Primary | ICD-10-CM

## 2020-11-09 PROCEDURE — 99207 PR NO CHARGE NURSE ONLY: CPT

## 2020-11-09 PROCEDURE — 99213 OFFICE O/P EST LOW 20 MIN: CPT | Mod: 95 | Performed by: INTERNAL MEDICINE

## 2020-11-09 RX ORDER — LISINOPRIL 10 MG/1
10 TABLET ORAL DAILY
Qty: 30 TABLET | Refills: 1 | Status: SHIPPED | OUTPATIENT
Start: 2020-11-09 | End: 2020-11-23

## 2020-11-09 NOTE — PROGRESS NOTES
Asked to triage pt for NURSE only BP.     Pt reports headaches at some times, but believes most of it is from shoulder/neck/upper back pain/tightness.     Looks like 2/2020 atenolol was increased. BP in July reading normal. Saw JW in Oct for poss UTI and he wanted her to recheck BP.     Pt is willing to make a med change and follow up. [facility] has huddled with Dr. Keane.     Evelyn VOSS RN       DN would like a phone visit- sent MC asking pt is she was available at 3:30pm.     Evelyn VOSS RN

## 2020-11-09 NOTE — PROGRESS NOTES
Katharina Rodríguez is a 55 year old patient who comes in today for a Blood Pressure check.  Initial BP:  BP (!) 154/88 (BP Location: Right arm, Cuff Size: Adult Regular)   Pulse 66   LMP 05/01/2015 (Approximate)      66  Disposition: BP elevated.  Triage RN notified, patient asked to wait

## 2020-11-09 NOTE — PROGRESS NOTES
"Chief Complaint   Patient presents with     Hypertension       Katharina Rodríguez is a 55 year old female who is being evaluated via a billable video visit.      [employer] [phone]      The patient has been notified of following:     \"This video visit will be conducted via a call between you and your physician/provider. We have found that certain health care needs can be provided without the need for an in-person physical exam.  This service lets us provide the care you need with a video conversation.  If a prescription is necessary we can send it directly to your pharmacy.  If lab work is needed we can place an order for that and you can then stop by our lab to have the test done at a later time.    Video visits are billed at different rates depending on your insurance coverage.  Please reach out to your insurance provider with any questions.    If during the course of the call the physician/provider feels a video visit is not appropriate, you will not be charged for this service.\"    Patient has given verbal consent for Video visit? Yes  How would you like to obtain your AVS? MyChart  If you are dropped from the video visit, the video invite should be resent to: Text to cell phone: [phone]  Will anyone else be joining your video visit? No    Subjective     Katharina Rodríguez is a 55 year old female who presents today via video visit for the following health issues:    HPI     Hypertension Follow-up  ( Blood pressure was elevated at nurse only visit)       Do you check your blood pressure regularly outside of the clinic? No     Are you following a low salt diet? Yes    Are your blood pressures ever more than 140 on the top number (systolic) OR more   than 90 on the bottom number (diastolic), for example 140/90? Not checking       How many servings of fruits and vegetables do you eat daily?  2-3    On average, how many sweetened beverages do you drink each day (Examples: soda, juice, sweet tea, etc.  " Do NOT count diet or artificially sweetened beverages)?   0    How many days per week do you exercise enough to make your heart beat faster? 5    How many minutes a day do you exercise enough to make your heart beat faster? 60 or more    How many days per week do you miss taking your medication? 0    Wt Readings from Last 3 Encounters:   10/26/20 81.8 kg (180 lb 4.8 oz)   02/13/20 76.7 kg (169 lb 1.6 oz)   02/12/20 76.7 kg (169 lb)      Hiking 2 miles per day 5-7 days per week      Review of Systems   CONSTITUTIONAL: NEGATIVE for fever, chills, change in weight  ENT/MOUTH: NEGATIVE for ear, mouth and throat problems  RESP: NEGATIVE for significant cough or SOB  CV: NEGATIVE for chest pain, palpitations or peripheral edema  GI: NEGATIVE for nausea, abdominal pain, heartburn, or change in bowel habits  MUSCULOSKELETAL: NEGATIVE for significant arthralgias or myalgia  NEURO: NEGATIVE for weakness, dizziness or paresthesias  PSYCHIATRIC: NEGATIVE for changes in mood or affect      Objective       Vitals:  No vitals were obtained today due to virtual visit.    Physical Exam     GENERAL: Healthy, alert and no distress  EYES: Eyes grossly normal to inspection.  No discharge or erythema, or obvious scleral/conjunctival abnormalities.  NECK: No asymmetry, visible masses or scars  RESP: No audible wheeze, cough, or visible cyanosis.  No visible retractions or increased work of breathing.    CV: no edema, regular carotid pulse  MS: No gross musculoskeletal defects noted.  Normal range of motion.  No visible edema.  SKIN: Visible skin clear. No significant rash, abnormal pigmentation or lesions.  NEURO: Cranial nerves grossly intact.  Mentation and speech appropriate for age.  PSYCH: Mentation appears normal, affect normal/bright, judgement and insight intact, normal speech and appearance well-groomed.          Assessment & Plan     (I10) Benign hypertension  (primary encounter diagnosis)  Comment: BLOOD PRESSURE elevated  "despite Atenolol  Nurse BLOOD PRESSURE check in 1-2 weeks. Once reach stable BLOOD PRESSURE, will then provide 90 day supply.   Plan:  lisinopril (ZESTRIL) 10 MG tablet; continue Atenolol  Cardiac risk assessment        BMI:   Estimated body mass index is 28.03 kg/m  as calculated from the following:    Height as of 10/26/20: 1.708 m (5' 7.25\").    Weight as of 10/26/20: 81.8 kg (180 lb 4.8 oz).   Weight management plan: Discussed healthy diet and exercise guidelines         Regular exercise  Decreased sodium intake    No follow-ups on file.    Elenita Keane MD  Internal Medicine   [facility]      Video-Visit Details    Type of service:  Video Visit    Video Start Time: 3:43 PM    Video End Time: 4:10 PM    Originating Location (pt. Location): Home    Distant Location (provider location):  [facility]     Platform used for Video Visit: Keya            "

## 2020-11-23 ENCOUNTER — ALLIED HEALTH/NURSE VISIT (OUTPATIENT)
Dept: NURSING | Facility: CLINIC | Age: 55
End: 2020-11-23
Payer: COMMERCIAL

## 2020-11-23 VITALS — DIASTOLIC BLOOD PRESSURE: 70 MMHG | SYSTOLIC BLOOD PRESSURE: 130 MMHG

## 2020-11-23 DIAGNOSIS — I10 BENIGN HYPERTENSION: ICD-10-CM

## 2020-11-23 DIAGNOSIS — Z01.30 BP CHECK: Primary | ICD-10-CM

## 2020-11-23 PROCEDURE — 99207 PR NO CHARGE NURSE ONLY: CPT

## 2020-11-23 RX ORDER — LISINOPRIL 10 MG/1
10 TABLET ORAL DAILY
Qty: 90 TABLET | Refills: 1 | Status: SHIPPED | OUTPATIENT
Start: 2020-11-23 | End: 2021-07-19

## 2020-11-23 NOTE — PROGRESS NOTES
Current Outpatient Medications   Medication Sig Dispense Refill     atenolol (TENORMIN) 50 MG tablet Take 1 tablet (50 mg) by mouth daily 90 tablet 3     lactobacillus rhamnosus, GG, (CULTURELL) capsule Take 1 capsule by mouth 2 times daily       lisinopril (ZESTRIL) 10 MG tablet Take 1 tablet (10 mg) by mouth daily Take daily along with Atenolol 30 tablet 1     Multiple Vitamin (MULTIVITAMIN OR) Take 1 capsule by mouth daily.       Vitamin D, Cholecalciferol, 1000 units CAPS Take 1 capsule by mouth          Doing well on current BLOOD PRESSURE   Will send 90 day supply of Lisinopril to pharmacy.     Elenita Keane MD  Internal Medicine  electronically signed

## 2020-11-23 NOTE — NURSING NOTE
Katharina Rodríguez is a 55 year old patient who comes in today for a Blood Pressure check.  Initial BP:  /70 (BP Location: Right arm, Patient Position: Chair, Cuff Size: Adult Large)   LMP 05/01/2015 (Approximate)      Data Unavailable  Disposition: follow-up as previously indicated by provider, provider notified while patient in the clinic and results routed to provider

## 2020-12-03 DIAGNOSIS — G47.33 OBSTRUCTIVE SLEEP APNEA (ADULT) (PEDIATRIC): Primary | ICD-10-CM

## 2021-01-18 DIAGNOSIS — I10 BENIGN HYPERTENSION: ICD-10-CM

## 2021-01-19 RX ORDER — LISINOPRIL 10 MG/1
10 TABLET ORAL DAILY
Qty: 90 TABLET | Refills: 1 | OUTPATIENT
Start: 2021-01-19

## 2021-01-27 DIAGNOSIS — I10 BENIGN HYPERTENSION: ICD-10-CM

## 2021-01-28 RX ORDER — LISINOPRIL 10 MG/1
TABLET ORAL
Qty: 30 TABLET | Refills: 1
Start: 2021-01-28

## 2021-01-28 NOTE — TELEPHONE ENCOUNTER
Prescription set to [facility] 11/23/2020 with refills.    Esmer Harding RN

## 2021-02-13 DIAGNOSIS — I10 BENIGN HYPERTENSION: ICD-10-CM

## 2021-02-13 DIAGNOSIS — I47.10 SVT (SUPRAVENTRICULAR TACHYCARDIA) (H): ICD-10-CM

## 2021-02-15 NOTE — TELEPHONE ENCOUNTER
"Requested Prescriptions   Pending Prescriptions Disp Refills     atenolol (TENORMIN) 50 MG tablet [Pharmacy Med Name: ATENOLOL 50 MG TABLET]    Last Written Prescription Date:  02/13/2020  Last Fill Quantity: 90 tablet,  # refills: 3   Last office visit: 02/13/2020 with prescribing provider: Diya  Future Office Visit:       90 tablet 3     Sig: TAKE 1 TABLET BY MOUTH EVERY DAY       Beta-Blockers Protocol Passed - 2/13/2021  1:06 PM        Passed - Blood pressure under 140/90 in past 12 months     BP Readings from Last 3 Encounters:   11/23/20 130/70   11/09/20 (!) 160/90   10/26/20 (!) 158/92                 Passed - Patient is age 6 or older        Passed - Recent (12 mo) or future (30 days) visit within the authorizing provider's specialty     Patient has had an office visit with the authorizing provider or a provider within the authorizing providers department within the previous 12 mos or has a future within next 30 days. See \"Patient Info\" tab in inbasket, or \"Choose Columns\" in Meds & Orders section of the refill encounter.              Passed - Medication is active on med list              "

## 2021-02-16 RX ORDER — ATENOLOL 50 MG/1
TABLET ORAL
Qty: 90 TABLET | Refills: 0 | Status: SHIPPED | OUTPATIENT
Start: 2021-02-16 | End: 2021-05-07

## 2021-03-14 ENCOUNTER — HEALTH MAINTENANCE LETTER (OUTPATIENT)
Age: 56
End: 2021-03-14

## 2021-04-12 DIAGNOSIS — I47.10 SVT (SUPRAVENTRICULAR TACHYCARDIA) (H): ICD-10-CM

## 2021-04-12 DIAGNOSIS — I10 BENIGN HYPERTENSION: ICD-10-CM

## 2021-04-12 RX ORDER — ATENOLOL 50 MG/1
50 TABLET ORAL DAILY
Qty: 90 TABLET | Refills: 0 | OUTPATIENT
Start: 2021-04-12

## 2021-05-07 DIAGNOSIS — I47.10 SVT (SUPRAVENTRICULAR TACHYCARDIA) (H): ICD-10-CM

## 2021-05-07 DIAGNOSIS — I10 BENIGN HYPERTENSION: ICD-10-CM

## 2021-05-07 RX ORDER — ATENOLOL 50 MG/1
50 TABLET ORAL DAILY
Qty: 90 TABLET | Refills: 0 | Status: SHIPPED | OUTPATIENT
Start: 2021-05-07 | End: 2021-08-18

## 2021-05-27 ENCOUNTER — TELEPHONE (OUTPATIENT)
Dept: FAMILY MEDICINE | Facility: CLINIC | Age: 56
End: 2021-05-27

## 2021-05-27 NOTE — TELEPHONE ENCOUNTER
Patient Quality Outreach      Summary:    Patient has the following on her problem list/HM:     Hypertension   Last three blood pressure readings:  BP Readings from Last 3 Encounters:   11/23/20 130/70   11/09/20 (!) 160/90   10/26/20 (!) 158/92     Blood pressure: Passed    HTN Guidelines:  ? 139/89     Patient is due/failing the following:   Breast Cancer Screening - Mammogram, Cervical Cancer Screening - PAP Needed and Annual wellness, date due: 01/10/21    Type of outreach:    Sent reKode Education message.    Questions for provider review:    None                                                                                                                                     Lisa Magill, CMA       Chart routed to Care Team.

## 2021-06-03 NOTE — TELEPHONE ENCOUNTER
Patient Quality Outreach 2nd Attempt      Summary:    Type of outreach:    NONE.  Patient has read the MY CHART message regarding the health maintenance that is due.      Next Steps:  Reach out within 90 days via not needed. .    Max number of attempts reached: No. Will try again in 90 days if patient still on fail list.    Questions for provider review:    None                                                                                                                    Lisa Magill, CMA       Chart routed to Provider.

## 2021-07-16 DIAGNOSIS — I10 BENIGN HYPERTENSION: ICD-10-CM

## 2021-07-19 NOTE — TELEPHONE ENCOUNTER
Routing refill request to provider for review/approval because:  Failed protocol for lisinopril - due for creatinine and potassium    Will forward to the station, please try to help the pt schedule an appt for a lab only appt.  Thanks!

## 2021-07-21 ENCOUNTER — MYC MEDICAL ADVICE (OUTPATIENT)
Dept: FAMILY MEDICINE | Facility: CLINIC | Age: 56
End: 2021-07-21

## 2021-07-22 RX ORDER — LISINOPRIL 10 MG/1
TABLET ORAL
Qty: 30 TABLET | Refills: 0 | Status: SHIPPED | OUTPATIENT
Start: 2021-07-22 | End: 2021-08-16

## 2021-07-22 NOTE — TELEPHONE ENCOUNTER
Patient has her lab appointment scheduled for 8/5, she has been out of this medication for 4 days now and would like a refill sent ASAP.  Georgiana Cho   [facility]  Central Scheduler

## 2021-07-29 ENCOUNTER — DOCUMENTATION ONLY (OUTPATIENT)
Dept: LAB | Facility: CLINIC | Age: 56
End: 2021-07-29

## 2021-07-29 DIAGNOSIS — I10 BENIGN HYPERTENSION: Primary | ICD-10-CM

## 2021-08-05 ENCOUNTER — ALLIED HEALTH/NURSE VISIT (OUTPATIENT)
Dept: NURSING | Facility: CLINIC | Age: 56
End: 2021-08-05
Payer: COMMERCIAL

## 2021-08-05 ENCOUNTER — LAB (OUTPATIENT)
Dept: LAB | Facility: CLINIC | Age: 56
End: 2021-08-05
Payer: COMMERCIAL

## 2021-08-05 VITALS — SYSTOLIC BLOOD PRESSURE: 112 MMHG | DIASTOLIC BLOOD PRESSURE: 74 MMHG

## 2021-08-05 DIAGNOSIS — I10 BENIGN HYPERTENSION: Primary | ICD-10-CM

## 2021-08-05 DIAGNOSIS — I10 BENIGN HYPERTENSION: ICD-10-CM

## 2021-08-05 PROCEDURE — 36415 COLL VENOUS BLD VENIPUNCTURE: CPT

## 2021-08-05 PROCEDURE — 99207 PR NO CHARGE NURSE ONLY: CPT

## 2021-08-05 PROCEDURE — 80053 COMPREHEN METABOLIC PANEL: CPT

## 2021-08-05 PROCEDURE — 80061 LIPID PANEL: CPT

## 2021-08-05 ASSESSMENT — PAIN SCALES - GENERAL: PAINLEVEL: NO PAIN (0)

## 2021-08-05 NOTE — PROGRESS NOTES
Katharina Rodríguez is a 55 year old patient who comes in today for a Blood Pressure check.  Initial BP:  /74 (BP Location: Left leg, Patient Position: Sitting, Cuff Size: Adult Regular)   LMP 05/01/2015 (Approximate)      Data Unavailable  Disposition: follow-up as previously indicated by provider    BP Readings from Last 6 Encounters:   08/05/21 112/74   11/23/20 130/70   11/09/20 (!) 160/90   10/26/20 (!) 158/92   07/29/20 138/68   02/13/20 (!) 146/86     Any Anderson MA

## 2021-08-06 LAB
ALBUMIN SERPL-MCNC: 3.8 G/DL (ref 3.4–5)
ALP SERPL-CCNC: 71 U/L (ref 40–150)
ALT SERPL W P-5'-P-CCNC: 26 U/L (ref 0–50)
ANION GAP SERPL CALCULATED.3IONS-SCNC: 7 MMOL/L (ref 3–14)
AST SERPL W P-5'-P-CCNC: 19 U/L (ref 0–45)
BILIRUB SERPL-MCNC: 0.4 MG/DL (ref 0.2–1.3)
BUN SERPL-MCNC: 16 MG/DL (ref 7–30)
CALCIUM SERPL-MCNC: 9 MG/DL (ref 8.5–10.1)
CHLORIDE BLD-SCNC: 108 MMOL/L (ref 94–109)
CHOLEST SERPL-MCNC: 250 MG/DL
CO2 SERPL-SCNC: 25 MMOL/L (ref 20–32)
CREAT SERPL-MCNC: 1.1 MG/DL (ref 0.52–1.04)
FASTING STATUS PATIENT QL REPORTED: NO
GFR SERPL CREATININE-BSD FRML MDRD: 56 ML/MIN/1.73M2
GLUCOSE BLD-MCNC: 88 MG/DL (ref 70–99)
HDLC SERPL-MCNC: 52 MG/DL
LDLC SERPL CALC-MCNC: 159 MG/DL
NONHDLC SERPL-MCNC: 198 MG/DL
POTASSIUM BLD-SCNC: 4.2 MMOL/L (ref 3.4–5.3)
PROT SERPL-MCNC: 6.8 G/DL (ref 6.8–8.8)
SODIUM SERPL-SCNC: 140 MMOL/L (ref 133–144)
TRIGL SERPL-MCNC: 195 MG/DL

## 2021-08-14 DIAGNOSIS — I47.10 SVT (SUPRAVENTRICULAR TACHYCARDIA) (H): ICD-10-CM

## 2021-08-14 DIAGNOSIS — I10 BENIGN HYPERTENSION: ICD-10-CM

## 2021-08-16 DIAGNOSIS — I10 BENIGN HYPERTENSION: ICD-10-CM

## 2021-08-16 DIAGNOSIS — I47.10 SVT (SUPRAVENTRICULAR TACHYCARDIA) (H): ICD-10-CM

## 2021-08-16 NOTE — TELEPHONE ENCOUNTER
Routing refill request to provider for review/approval because:  Failed protocol for lisinopril - creatinine is off

## 2021-08-18 RX ORDER — LISINOPRIL 10 MG/1
TABLET ORAL
Qty: 90 TABLET | Refills: 0 | Status: SHIPPED | OUTPATIENT
Start: 2021-08-18 | End: 2021-11-13

## 2021-08-18 RX ORDER — ATENOLOL 50 MG/1
50 TABLET ORAL DAILY
Qty: 90 TABLET | Refills: 0 | Status: SHIPPED | OUTPATIENT
Start: 2021-08-18 | End: 2021-08-24

## 2021-08-18 RX ORDER — ATENOLOL 50 MG/1
TABLET ORAL
Qty: 90 TABLET | Refills: 0 | Status: SHIPPED | OUTPATIENT
Start: 2021-08-18 | End: 2021-11-29

## 2021-08-18 NOTE — TELEPHONE ENCOUNTER
.   Lab Results   Component Value Date    CR 1.10 08/05/2021    CR 0.92 08/17/2017    CR 0.94 04/09/2015        Lab Results   Component Value Date    POTASSIUM 4.2 08/05/2021    POTASSIUM 4.1 08/17/2017    POTASSIUM 4.5 04/09/2015

## 2021-08-18 NOTE — TELEPHONE ENCOUNTER
Prescription approved per [facility] Refill Protocol.  Sarah Barboza RN

## 2021-08-23 ENCOUNTER — TELEPHONE (OUTPATIENT)
Dept: FAMILY MEDICINE | Facility: CLINIC | Age: 56
End: 2021-08-23

## 2021-08-23 NOTE — TELEPHONE ENCOUNTER
Pt had suspected kidney stones in October 2020. Patient is currently having the same symptoms as October 2020. Would like to know next steps.     Katharina- [phone], ok to leave detailed message.     Jimena Ruiz-

## 2021-08-24 ENCOUNTER — OFFICE VISIT (OUTPATIENT)
Dept: FAMILY MEDICINE | Facility: CLINIC | Age: 56
End: 2021-08-24
Payer: COMMERCIAL

## 2021-08-24 VITALS
WEIGHT: 174.6 LBS | TEMPERATURE: 97.9 F | HEART RATE: 56 BPM | OXYGEN SATURATION: 96 % | BODY MASS INDEX: 27.14 KG/M2 | SYSTOLIC BLOOD PRESSURE: 100 MMHG | RESPIRATION RATE: 16 BRPM | DIASTOLIC BLOOD PRESSURE: 64 MMHG

## 2021-08-24 DIAGNOSIS — R10.9 FLANK PAIN: ICD-10-CM

## 2021-08-24 DIAGNOSIS — I47.10 SVT (SUPRAVENTRICULAR TACHYCARDIA) (H): ICD-10-CM

## 2021-08-24 DIAGNOSIS — R30.0 DYSURIA: Primary | ICD-10-CM

## 2021-08-24 DIAGNOSIS — I10 BENIGN HYPERTENSION: ICD-10-CM

## 2021-08-24 LAB
ALBUMIN UR-MCNC: 30 MG/DL
APPEARANCE UR: CLEAR
BACTERIA #/AREA URNS HPF: ABNORMAL /HPF
BILIRUB UR QL STRIP: ABNORMAL
COLOR UR AUTO: YELLOW
GLUCOSE UR STRIP-MCNC: NEGATIVE MG/DL
HGB UR QL STRIP: NEGATIVE
HYALINE CASTS #/AREA URNS LPF: ABNORMAL /LPF
KETONES UR STRIP-MCNC: NEGATIVE MG/DL
LEUKOCYTE ESTERASE UR QL STRIP: NEGATIVE
NITRATE UR QL: NEGATIVE
PH UR STRIP: 5.5 [PH] (ref 5–7)
RBC #/AREA URNS AUTO: ABNORMAL /HPF
SP GR UR STRIP: >=1.03 (ref 1–1.03)
SQUAMOUS #/AREA URNS AUTO: ABNORMAL /LPF
UROBILINOGEN UR STRIP-ACNC: 0.2 E.U./DL
WBC #/AREA URNS AUTO: ABNORMAL /HPF

## 2021-08-24 PROCEDURE — 81001 URINALYSIS AUTO W/SCOPE: CPT | Performed by: INTERNAL MEDICINE

## 2021-08-24 PROCEDURE — 99215 OFFICE O/P EST HI 40 MIN: CPT | Performed by: INTERNAL MEDICINE

## 2021-08-24 PROCEDURE — 87086 URINE CULTURE/COLONY COUNT: CPT | Performed by: INTERNAL MEDICINE

## 2021-08-24 RX ORDER — METHYLPREDNISOLONE 4 MG
TABLET, DOSE PACK ORAL
Qty: 21 TABLET | Refills: 0 | Status: SHIPPED | OUTPATIENT
Start: 2021-08-24 | End: 2021-11-29

## 2021-08-24 RX ORDER — TAMSULOSIN HYDROCHLORIDE 0.4 MG/1
0.4 CAPSULE ORAL 2 TIMES DAILY
Qty: 20 CAPSULE | Refills: 0 | Status: SHIPPED | OUTPATIENT
Start: 2021-08-24 | End: 2021-11-29

## 2021-08-24 ASSESSMENT — PAIN SCALES - GENERAL: PAINLEVEL: MODERATE PAIN (4)

## 2021-08-24 NOTE — PROGRESS NOTES
Assessment & Plan     (R30.0) Dysuria  (primary encounter diagnosis)  Comment: reviewed UA, no blood noted; no evidence to suggest infection; no costovertebral angle tenderness    Reviewed findings and concerns about stone vs other possibilities; discussed  Plan: **UA reflex to Microscopic FUTURE 2mo, Urine         Microscopic Exam, Urine Culture Aerobic         Bacterial - lab collect          (R10.9) Flank pain  Comment:  reviewed UA, no blood noted; no evidence to suggest infection; no costovertebral angle tenderness    Reviewed findings and concerns about stone vs other possibilities; discussed findings that would be more consistent with a kidney stone and due to pain, could add Flomax and steroid to help lessen presumed inflammation in ureter to help a stone pass if present.   CT scan ordered. If large sone present, may need to consider other treatment options including : urology evaluation, lithotripsy,cytosopy, etc or consider Muskuloskeletal origin for pain. 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    Treatment and further evaluation is warranted as this is now the 2nd episode of similar like pain and warrants search for cause.   Plan: Urine Culture Aerobic Bacterial - lab collect,         CT Abdomen Pelvis w/o Contrast, tamsulosin         (FLOMAX) 0.4 MG capsule, methylPREDNISolone         (MEDROL DOSEPAK) 4 MG tablet therapy pack          (I10) Benign hypertension  Comment: BLOOD PRESSURE OK; running a bit on the lower side; Atemolol also used to help limit recurrences of tachycardia;  Overall, feels well on current medications.   Plan: no change in BLOOD PRESSURE related medications    (I47.1) SVT (supraventricular tachycardia) (H)  Comment: well controlled by BB  Plan: no change in heart rate/rhythm related medications.     Review of external notes as documented elsewhere in note  Ordering of each unique test  Prescription drug management  40 minutes spent on the date of the  "encounter doing chart review, history and exam, documentation and further activities per the note       BMI:   Estimated body mass index is 27.14 kg/m  as calculated from the following:    Height as of 10/26/20: 1.708 m (5' 7.25\").    Weight as of this encounter: 79.2 kg (174 lb 9.6 oz).   Weight management plan: Discussed healthy diet and exercise guidelines    MEDICATIONS:   Orders Placed This Encounter   Medications     tamsulosin (FLOMAX) 0.4 MG capsule     Sig: Take 1 capsule (0.4 mg) by mouth 2 times daily     Dispense:  20 capsule     Refill:  0     methylPREDNISolone (MEDROL DOSEPAK) 4 MG tablet therapy pack     Sig: Follow Package Directions     Dispense:  21 tablet     Refill:  0          - Continue other medications without change  FURTHER TESTING:       - CT to assess for possible kidney stone is ordered.   FUTURE APPOINTMENTS:       - Follow-up visit after scan; further evaluation depends on findings( Urology if stone, reassess if GI related or M/S if back  Found to be the culprit  She was visibly upset at end of visit as she was hoping for a new Rx for narcotics. She heidy from chair and stated \" need to look for a new clinic\".  Extensive discussion about finding out the cause of her pain, especially since this is the second episode in a year without clear etiology; no formal request was made for narcotics until she became upset and heidy to leave.  Attempted to discuss possible causes and ways to help lessen symptoms;       Return in within 1 week  if symptoms fail to improve- unclear etiology of her pain.    Elenita Keane MD  Internal Medicine   [facility] EDGAR Posadas is a 55 year old who presents for the following health issues     HPI     Genitourinary - Female   Right lower back pain   Onset/Duration: Sunday afternoon    Description:   Painful urination (Dysuria): no           Frequency: no  Blood in urine (Hematuria): no  Delay in urine " (Hesitency): no  Intensity: moderate  Progression of Symptoms:  same  Accompanying Signs & Symptoms:  Fever/chills: no  Flank pain: YES  Nausea and vomiting: no  Vaginal symptoms: none  Abdominal/Pelvic Pain: some pelvic discomfort  History:   History of frequent UTI s: no  History of kidney stones: States believes she had a kidney stone in the past. chart reviewed.  Sexually Active: no  Possibility of pregnancy: No  Precipitating or alleviating factors: uncertain  Therapies tried and outcome:  States has taken Baclofen and Norco  that she had left at home from previous prescriptions due to significant pain..         Review of Systems   CONSTITUTIONAL: NEGATIVE for fever, chills, change in weight  ENT/MOUTH: NEGATIVE for ear, mouth and throat problems  RESP: NEGATIVE for significant cough or SOB  CV: NEGATIVE for chest pain, palpitations or peripheral edema  GI: NEGATIVE for nausea, abdominal pain, heartburn, or change in bowel habits and no history of diverticulitis  : she is postmenopausal. Last year, she had initial episode of pain and was seen in clinic. Due to concerns about pain, it was felt she could have kidney stone and a CT scan was ordered. CT was negative for stone, hydronephosis, etc.  She was treated with Baclofen and narcotics for pain and it subsided.   MUSCULOSKELETAL: no history of back pain or injury  NEURO: NEGATIVE for weakness, dizziness or paresthesias  ENDOCRINE: NEGATIVE for temperature intolerance, skin/hair changes  PSYCHIATRIC: NEGATIVE for changes in mood or affect      Objective    /64   Pulse 56   Temp 97.9  F (36.6  C) (Oral)   Resp 16   Wt 79.2 kg (174 lb 9.6 oz)   LMP 05/01/2015 (Approximate)   SpO2 96%   BMI 27.14 kg/m    Body mass index is 27.14 kg/m .  Physical Exam   GENERAL: healthy, alert and no distress  EYES: Eyes grossly normal to inspection  NECK: no adenopathy, no asymmetry, masses, or scars and thyroid normal to palpation  RESP: lungs clear to auscultation  "- no rales, rhonchi or wheezes  CV: regular rate and rhythm, normal S1 S2, no S3 or S4, no murmur, click or rub, no peripheral edema and peripheral pulses strong  ABDOMEN: soft, nontender, without hepatosplenomegaly or masses, liver span normal to percussion, bowel sounds normal, no bruits heard and no palpable renal abnormalities    (female): no costovertebral angle tenderness; no suprapubic tenderness.   MS: no gross musculoskeletal defects noted, no edema  NEURO: Normal strength and tone, mentation intact and speech normal  PSYCH: visibly upset at end of visit as she was hoping for a new Rx for narcotics. She heidy from chair and stated \" need to look for a new clinic\"            "

## 2021-08-25 ENCOUNTER — HOSPITAL ENCOUNTER (OUTPATIENT)
Dept: CT IMAGING | Facility: CLINIC | Age: 56
Discharge: HOME | End: 2021-08-25
Attending: INTERNAL MEDICINE | Admitting: INTERNAL MEDICINE
Payer: COMMERCIAL

## 2021-08-25 DIAGNOSIS — R10.9 FLANK PAIN: ICD-10-CM

## 2021-08-25 LAB — BACTERIA UR CULT: NORMAL

## 2021-08-25 PROCEDURE — 74176 CT ABD & PELVIS W/O CONTRAST: CPT

## 2021-10-24 ENCOUNTER — HEALTH MAINTENANCE LETTER (OUTPATIENT)
Age: 56
End: 2021-10-24

## 2021-11-12 DIAGNOSIS — I10 BENIGN HYPERTENSION: ICD-10-CM

## 2021-11-12 DIAGNOSIS — R79.89 ELEVATED SERUM CREATININE: Primary | ICD-10-CM

## 2021-11-12 NOTE — TELEPHONE ENCOUNTER
Routing refill request to provider for review/approval because:  Labs out of range:  creatinine    Creatinine   Date Value Ref Range Status   08/05/2021 1.10 (H) 0.52 - 1.04 mg/dL Final   08/17/2017 0.92 0.52 - 1.04 mg/dL Final     Nicholas CM RN

## 2021-11-13 RX ORDER — LISINOPRIL 10 MG/1
TABLET ORAL
Qty: 30 TABLET | Refills: 0 | Status: SHIPPED | OUTPATIENT
Start: 2021-11-13 | End: 2021-11-29

## 2021-11-13 NOTE — TELEPHONE ENCOUNTER
Chart reviewed. At 8/24/2021 appt, pt indicated plans to transfer care to another clinic/provider.  Unclear if she has done so.  Lab Results   Component Value Date    CR 1.10 08/05/2021    CR 0.92 08/17/2017    CR 0.94 04/09/2015      Creatinine not in normal range and due to be rechecked.   Pt due for labs. Clarification of plan of care and would need appt; could be virtual or in clinic.  Will provide #30 until clarified.

## 2021-11-29 ENCOUNTER — OFFICE VISIT (OUTPATIENT)
Dept: FAMILY MEDICINE | Facility: CLINIC | Age: 56
End: 2021-11-29
Payer: COMMERCIAL

## 2021-11-29 VITALS
HEIGHT: 67 IN | OXYGEN SATURATION: 98 % | TEMPERATURE: 98.1 F | SYSTOLIC BLOOD PRESSURE: 112 MMHG | BODY MASS INDEX: 26.68 KG/M2 | WEIGHT: 170 LBS | DIASTOLIC BLOOD PRESSURE: 80 MMHG | HEART RATE: 63 BPM

## 2021-11-29 DIAGNOSIS — M76.821 POSTERIOR TIBIAL TENDINITIS OF RIGHT LOWER EXTREMITY: ICD-10-CM

## 2021-11-29 DIAGNOSIS — I47.10 SVT (SUPRAVENTRICULAR TACHYCARDIA) (H): ICD-10-CM

## 2021-11-29 DIAGNOSIS — Z82.49 FAMILY HISTORY OF ISCHEMIC HEART DISEASE: ICD-10-CM

## 2021-11-29 DIAGNOSIS — R87.810 CERVICAL HIGH RISK HPV (HUMAN PAPILLOMAVIRUS) TEST POSITIVE: ICD-10-CM

## 2021-11-29 DIAGNOSIS — Z00.00 ROUTINE HISTORY AND PHYSICAL EXAMINATION OF ADULT: ICD-10-CM

## 2021-11-29 DIAGNOSIS — I10 BENIGN HYPERTENSION: ICD-10-CM

## 2021-11-29 LAB
CHOLEST SERPL-MCNC: 279 MG/DL
FASTING STATUS PATIENT QL REPORTED: YES
HDLC SERPL-MCNC: 50 MG/DL
LDLC SERPL CALC-MCNC: 184 MG/DL
NONHDLC SERPL-MCNC: 229 MG/DL
TRIGL SERPL-MCNC: 223 MG/DL

## 2021-11-29 PROCEDURE — 80048 BASIC METABOLIC PNL TOTAL CA: CPT | Performed by: FAMILY MEDICINE

## 2021-11-29 PROCEDURE — 87624 HPV HI-RISK TYP POOLED RSLT: CPT | Performed by: FAMILY MEDICINE

## 2021-11-29 PROCEDURE — 99396 PREV VISIT EST AGE 40-64: CPT | Performed by: FAMILY MEDICINE

## 2021-11-29 PROCEDURE — 80061 LIPID PANEL: CPT | Performed by: FAMILY MEDICINE

## 2021-11-29 PROCEDURE — 82043 UR ALBUMIN QUANTITATIVE: CPT | Performed by: FAMILY MEDICINE

## 2021-11-29 PROCEDURE — 36415 COLL VENOUS BLD VENIPUNCTURE: CPT | Performed by: FAMILY MEDICINE

## 2021-11-29 PROCEDURE — G0145 SCR C/V CYTO,THINLAYER,RESCR: HCPCS | Performed by: FAMILY MEDICINE

## 2021-11-29 RX ORDER — LISINOPRIL 10 MG/1
TABLET ORAL
Qty: 90 TABLET | Refills: 4 | Status: SHIPPED | OUTPATIENT
Start: 2021-11-29 | End: 2022-12-05

## 2021-11-29 RX ORDER — ATENOLOL 50 MG/1
50 TABLET ORAL DAILY
Qty: 90 TABLET | Refills: 4 | Status: SHIPPED | OUTPATIENT
Start: 2021-11-29 | End: 2022-12-13

## 2021-11-29 SDOH — ECONOMIC STABILITY: FOOD INSECURITY: WITHIN THE PAST 12 MONTHS, THE FOOD YOU BOUGHT JUST DIDN'T LAST AND YOU DIDN'T HAVE MONEY TO GET MORE.: NEVER TRUE

## 2021-11-29 SDOH — ECONOMIC STABILITY: INCOME INSECURITY: IN THE LAST 12 MONTHS, WAS THERE A TIME WHEN YOU WERE NOT ABLE TO PAY THE MORTGAGE OR RENT ON TIME?: NO

## 2021-11-29 SDOH — ECONOMIC STABILITY: INCOME INSECURITY: HOW HARD IS IT FOR YOU TO PAY FOR THE VERY BASICS LIKE FOOD, HOUSING, MEDICAL CARE, AND HEATING?: SOMEWHAT HARD

## 2021-11-29 SDOH — ECONOMIC STABILITY: TRANSPORTATION INSECURITY
IN THE PAST 12 MONTHS, HAS THE LACK OF TRANSPORTATION KEPT YOU FROM MEDICAL APPOINTMENTS OR FROM GETTING MEDICATIONS?: NO

## 2021-11-29 SDOH — HEALTH STABILITY: PHYSICAL HEALTH: ON AVERAGE, HOW MANY MINUTES DO YOU ENGAGE IN EXERCISE AT THIS LEVEL?: 20 MIN

## 2021-11-29 SDOH — ECONOMIC STABILITY: FOOD INSECURITY: WITHIN THE PAST 12 MONTHS, YOU WORRIED THAT YOUR FOOD WOULD RUN OUT BEFORE YOU GOT MONEY TO BUY MORE.: NEVER TRUE

## 2021-11-29 SDOH — ECONOMIC STABILITY: TRANSPORTATION INSECURITY
IN THE PAST 12 MONTHS, HAS LACK OF TRANSPORTATION KEPT YOU FROM MEETINGS, WORK, OR FROM GETTING THINGS NEEDED FOR DAILY LIVING?: NO

## 2021-11-29 SDOH — HEALTH STABILITY: PHYSICAL HEALTH: ON AVERAGE, HOW MANY DAYS PER WEEK DO YOU ENGAGE IN MODERATE TO STRENUOUS EXERCISE (LIKE A BRISK WALK)?: 5 DAYS

## 2021-11-29 ASSESSMENT — SOCIAL DETERMINANTS OF HEALTH (SDOH)
HOW OFTEN DO YOU GET TOGETHER WITH FRIENDS OR RELATIVES?: ONCE A WEEK
IN A TYPICAL WEEK, HOW MANY TIMES DO YOU TALK ON THE PHONE WITH FAMILY, FRIENDS, OR NEIGHBORS?: ONCE A WEEK
HOW OFTEN DO YOU ATTEND CHURCH OR RELIGIOUS SERVICES?: NEVER
DO YOU BELONG TO ANY CLUBS OR ORGANIZATIONS SUCH AS CHURCH GROUPS UNIONS, FRATERNAL OR ATHLETIC GROUPS, OR SCHOOL GROUPS?: NO

## 2021-11-29 ASSESSMENT — ENCOUNTER SYMPTOMS
NAUSEA: 0
BREAST MASS: 0
HEARTBURN: 0
SHORTNESS OF BREATH: 0
MYALGIAS: 1
WEAKNESS: 0
FEVER: 0
PALPITATIONS: 0
DYSURIA: 0
NERVOUS/ANXIOUS: 1
ARTHRALGIAS: 1
HEMATOCHEZIA: 0
COUGH: 0
EYE PAIN: 0
JOINT SWELLING: 0
SORE THROAT: 0
HEADACHES: 0
PARESTHESIAS: 0
HEMATURIA: 0
ABDOMINAL PAIN: 0
DIARRHEA: 0
FREQUENCY: 0
DIZZINESS: 0
CONSTIPATION: 0
CHILLS: 0

## 2021-11-29 ASSESSMENT — LIFESTYLE VARIABLES
HOW MANY STANDARD DRINKS CONTAINING ALCOHOL DO YOU HAVE ON A TYPICAL DAY: 1 OR 2
HOW OFTEN DO YOU HAVE A DRINK CONTAINING ALCOHOL: 2-4 TIMES A MONTH
HOW OFTEN DO YOU HAVE SIX OR MORE DRINKS ON ONE OCCASION: NEVER

## 2021-11-29 ASSESSMENT — MIFFLIN-ST. JEOR: SCORE: 1383.74

## 2021-11-29 NOTE — PROGRESS NOTES
SUBJECTIVE:   CC: Katharina Rodríguez is an 56 year old woman who presents for preventive health visit.     She mentions low back pain last summer.   It had happened one other time in her life.   She said it came quick and she was out of it for a few days and then it got better.       She also has had right leg pain and was in boot.   See PMH.    If this occurs again would like PT.      Patient has been advised of split billing requirements and indicates understanding: Yes  Healthy Habits:     Getting at least 3 servings of Calcium per day:  NO    Bi-annual eye exam:  NO    Dental care twice a year:  NO    Sleep apnea or symptoms of sleep apnea:  Sleep apnea    Diet:  Regular (no restrictions)    Frequency of exercise:  None    Taking medications regularly:  Yes    PHQ-2 Total Score: 0    Additional concerns today:  Yes          Right ankel   Lower back       Hypertension Follow-up      Do you check your blood pressure regularly outside of the clinic? No     Are you following a low salt diet? No    Are your blood pressures ever more than 140 on the top number (systolic) OR more   than 90 on the bottom number (diastolic), for example 140/90? Yes      Today's PHQ-2 Score:   PHQ-2 ( 1999 Pf[employer]) 11/29/2021   Q1: Little interest or pleasure in doing things 0   Q2: Feeling down, depressed or hopeless 0   PHQ-2 Score 0   PHQ-2 Total Score (12-17 Years)- Positive if 3 or more points; Administer PHQ-A if positive -   Q1: Little interest or pleasure in doing things Not at all   Q2: Feeling down, depressed or hopeless Not at all   PHQ-2 Score 0       Abuse: Current or Past (Physical, Sexual or Emotional) - No  Do you feel safe in your environment? Yes        Social History     Tobacco Use     Smoking status: Never Smoker     Smokeless tobacco: Never Used   Substance Use Topics     Alcohol use: Yes     Comment: social      If you drink alcohol do you typically have >3 drinks per day or >7 drinks per week? No    Alcohol  Use 11/29/2021   Prescreen: >3 drinks/day or >7 drinks/week? No   Prescreen: >3 drinks/day or >7 drinks/week? -       Reviewed orders with patient.  Reviewed health maintenance and updated orders accordingly - Yes  Labs reviewed in EPIC    Breast Cancer Screening:    Breast CA Risk Assessment (FHS-7) 11/29/2021   Do you have a family history of breast, colon, or ovarian cancer? No / Unknown         Mammogram Screening: Recommended mammography every 1-2 years with patient discussion and risk factor consideration  Pertinent mammograms are reviewed under the imaging tab.    History of abnormal Pap smear: NO - age 30-65 PAP every 5 years with negative HPV co-testing recommended  PAP / HPV Latest Ref Rng & Units 1/10/2018 11/9/2016 11/4/2015   PAP (Historical) - NIL NIL -   HPV16 NEG:Negative Negative Negative Negative   HPV18 NEG:Negative Negative Negative Negative   HRHPV NEG:Negative Negative Positive(A) Positive(A)     Past Medical History:   Diagnosis Date     Cervical high risk HPV (human papillomavirus) test positive 11/2015,11/09/16    + HPV (not 16 or 18)     History of colposcopy with cervical biopsy 02/07/2017 02/07/17: [facility] LSIL DEVYN 1.      Hypertension      Posterior tibial tendinitis of right lower extremity 2019       Past Surgical History:   Procedure Laterality Date     COLONOSCOPY N/A 11/18/2015    RPT 10 years,  Kole Owen MD;  Location: [facility]     NONE OF THE ABOVE CORE MEASURE DIAGNOSES         MEDICATIONS:  Current Outpatient Medications   Medication     atenolol (TENORMIN) 50 MG tablet     lisinopril (ZESTRIL) 10 MG tablet     lactobacillus rhamnosus, GG, (CULTURELL) capsule     Multiple Vitamin (MULTIVITAMIN OR)     Vitamin D, Cholecalciferol, 1000 units CAPS     No current facility-administered medications for this visit.       SOCIAL HISTORY:  Social History     Tobacco Use     Smoking status: Never Smoker     Smokeless tobacco: Never Used   Substance Use Topics     Alcohol use:  "Yes     Comment: social        Family History   Problem Relation Age of Onset     Hypertension Father      C.A.D. Father      Heart Disease Father      Psychotic Disorder Sister         bi polar     Neurologic Disorder Daughter         parkinsons       Review of Systems   Constitutional: Negative for chills and fever.   HENT: Negative for congestion, ear pain, hearing loss and sore throat.    Eyes: Negative for pain and visual disturbance.   Respiratory: Negative for cough and shortness of breath.    Cardiovascular: Negative for chest pain, palpitations and peripheral edema.   Gastrointestinal: Negative for abdominal pain, constipation, diarrhea, heartburn, hematochezia and nausea.   Breasts:  Negative for tenderness, breast mass and discharge.   Genitourinary: Negative for dysuria, frequency, genital sores, hematuria, pelvic pain, urgency, vaginal bleeding and vaginal discharge.   Musculoskeletal: Positive for arthralgias and myalgias. Negative for joint swelling.   Skin: Negative for rash.   Neurological: Negative for dizziness, weakness, headaches and paresthesias.   Psychiatric/Behavioral: Negative for mood changes. The patient is nervous/anxious.           OBJECTIVE:   /80   Pulse 63   Temp 98.1  F (36.7  C) (Oral)   Ht 1.702 m (5' 7\")   Wt 77.1 kg (170 lb)   LMP 05/01/2015 (Approximate)   SpO2 98%   BMI 26.63 kg/m    Physical Exam  GENERAL APPEARANCE: healthy, alert and no distress  EYES: Eyes grossly normal to inspection, PERRL and conjunctivae and sclerae normal  HENT: ear canals and TM's normal, nose and mouth without ulcers or lesions, oropharynx clear and oral mucous membranes moist  NECK: no adenopathy, no asymmetry, masses, or scars and thyroid normal to palpation  RESP: lungs clear to auscultation - no rales, rhonchi or wheezes  BREAST: normal without masses, tenderness or nipple discharge and no palpable axillary masses or adenopathy  CV: regular rate and rhythm, normal S1 S2, no S3 or " S4, no murmur, click or rub, no peripheral edema and peripheral pulses strong  ABDOMEN: soft, nontender, no hepatosplenomegaly, no masses and bowel sounds normal   (female): normal female external genitalia, normal urethral meatus, vaginal mucosal atrophy noted, normal cervix, adnexae, and uterus without masses or abnormal discharge  MS: no musculoskeletal defects are noted and gait is age appropriate without ataxia  SKIN: no suspicious lesions or rashes  NEURO: Normal strength and tone, sensory exam grossly normal, mentation intact and speech normal  PSYCH: mentation appears normal and affect normal/bright        Diagnostic Test Results:  Labs reviewed in Epic    ASSESSMENT/PLAN:   (Z00.00) Routine history and physical examination of adult  (primary encounter diagnosis)  Comment:   Plan: *MA Screening Digital Bilateral, Pap screen         with HPV - recommended age 30 - 65 years, Lipid        panel reflex to direct LDL Fasting, Basic         metabolic panel  (Ca, Cl, CO2, Creat, Gluc, K,         Na, BUN)            (I10) Benign hypertension  Comment: under good control   Plan: atenolol (TENORMIN) 50 MG tablet, lisinopril         (ZESTRIL) 10 MG tablet, Albumin Random Urine         Quantitative with Creat Ratio, Basic metabolic         panel  (Ca, Cl, CO2, Creat, Gluc, K, Na, BUN)            (R87.810) Cervical high risk HPV (human papillomavirus) test positive  Comment:   Plan: Pap screen with HPV - recommended age 30 - 65         years            (I47.1) SVT (supraventricular tachycardia) (H)  Comment: discussed med diet  Plan: atenolol (TENORMIN) 50 MG tablet            (M76.821) Posterior tibial tendinitis of right lower extremity  Comment: not bothering her now  Plan: can give PT order in future if this flares    (Z82.49) Family history of ischemic heart disease  Comment:   Plan: Lipid panel reflex to direct LDL Fasting, CRP         cardiac risk        Handout on the above diagnosis was given to the patient  "and discussed        Patient has been advised of split billing requirements and indicates understanding: Yes  COUNSELING:  Reviewed preventive health counseling, as reflected in patient instructions  Special attention given to:        Regular exercise       Immunizations    Vaccinated for: Influenza          Estimated body mass index is 26.63 kg/m  as calculated from the following:    Height as of this encounter: 1.702 m (5' 7\").    Weight as of this encounter: 77.1 kg (170 lb).        She reports that she has never smoked. She has never used smokeless tobacco.      Counseling Resources:  ATP IV Guidelines  Pooled Cohorts Equation Calculator  Breast Cancer Risk Calculator  BRCA-Related Cancer Risk Assessment: FHS-7 Tool  FRAX Risk Assessment  ICSI Preventive Guidelines  Dietary Guidelines for Americans, 2010  USDA's MyPlate  ASA Prophylaxis  Lung CA Screening    Becca Paula MD  [facility]"

## 2021-11-29 NOTE — PATIENT INSTRUCTIONS
You may schedule your mammogram at [facility] by calling [phone] and asking to schedule your mammogram or you may schedule with [facility] in [place] by calling [phone].    Patient Education     Mediterranean Diet  A heart healthy eating plan  The Mediterranean Diet is based on the eating habits of people in countries near the [place]. People living in this part of the world have long lives and low rates of chronic diseases. They have lower rates of death from heart disease, cancer and other illnesses.  When you follow this eating plan you'll have better control of your blood sugar and weight. The plan requires simple changes in your habits of eating, and the whole family can take part.  Mediterranean lifestyle   Enjoy eating with others. Sit at the table with family and friends and enjoy your meal. When you eat slowly, you are able to tune in to your body's hunger and fullness signals. You're less likely to overeat.  Be physically active: Being active every day is important for overall good health. Run, walk, dance and do lighter activities such as house and yard work. Move more and sit less!  Drink plenty of water during the day.  Drink red wine with meals in modest amounts (optional).  The Mediterranean Pyramid   The pyramid shows the food groups and the amounts eaten in relation to the whole diet. It is more than a diet; it is also a life-style plan.  The largest food group at bottom contains plant foods: vegetables, fruits, grains, nuts, legumes, seeds, olives and olive oil. These foods make up the largest part of your meals.   The next groups above: fish and seafood, then poultry, cheese, eggs and yogurt are eaten less often and in smaller servings.   The top group, meats and sweets, are eaten the least often and in the smallest amounts.    Tips for adding plant foods to your meals   Vegetables and fruits:     Aim for 3 to 8 servings each day. A  "serving is   to 2 cups, depending on the food.    Choose a variety of colors. Big green salads are a great way to include several vegetable servings.    Try fresh fruit as a dessert: oranges, grapes, apples pomegranates or fresh figs.    At home keep fresh fruit in a bowl to tempt family.    Bring fruit and vegetables to work for a snack.  Oil     Replace butter and margarine with healthy fats such as olive oil. Other plant-based oils are canola, walnut and peanut oil. These are high in good fats. Use them in cooking, salad dressings and baking.    Drizzle your bread with olive oil instead of butter or margarine. Use herbs and spices to add flavor and aroma and to reduce fat and salt when cooking.  Whole grains    Look for the term \"whole\" or \"whole grain\" on the package. Processing grains removes vitamins, minerals and fiber. Whole grains may include: corn, wheat, oats, rye, rice and barley.    Examples of Mediterranean grains include: barley, farro, buckwheat, bulgur, couscous, and wheatberries.    Slowly switch to a whole grain by using whole-grain blends of pastas and rice. Or mix whole grains with refined; for example, mix whole-wheat pasta with white pasta.  Beans and legumes     Beans are a good source of protein and fiber, adding flavor and texture to dishes. Examples include cannellini beans, chickpea, yulissa beans, green beans, kidney beans, lentils and split peas.    Cook a vegetarian meal one night a week: use beans or legumes with vegetables and grains.    Nuts are high in healthy fats. Try walnuts, almonds, pine nuts, hazelnuts and cashews. Avoid candied, honey-roasted and heavily salted nuts.    Limit your intake of nuts to a small handful each day. Explore ways to add to nuts to salads and other dishes.  Tips for using fish and seafood in your meals    Aim for meals with fish or shellfish at least twice a week.    Tuna, herring, salmon and sardines are rich in heart-healthy omega-3. Shellfish, such as " mussels, oysters and clams, have similar benefits for brain and heart health.  Tips for using poultry, eggs and cheese and yogurt in your meals    Eat poultry or eggs at least twice a week.    Roast, broil or grill your poultry. Season with fresh or dried herbs.    Enjoy low-fat cheese or yogurt every day.  Tips for using red meat and sweets in your meals     Limit lean red meat to one time a week or 4 times a month.    Red meat has more saturated fats. Choose a lean cut, like top loin, sirloin, flank steak, strip steak or 90% lean ground beef.    Limit portions to 3 to 4 ounces.    Make whole grains and vegetables the main focus of a meal. Add meat in small amounts for flavor.    Limit sweets such as ice cream or cookies for a special times or holidays.  Snacks    Snack on a handful of almonds, walnuts or sunflower seeds in place of chips, cookies or other processed snack foods.    Calcium-rich, low-fat cheese or low- and nonfat plain yogurt with fresh fruit are healthy snacks that are easy to take with you.  Like to know more?  For tips on shoppping, cooking and eating well the Mediterranean way, go to the [employer] website at www.Good People.Heroku.  For informational purposes only. Not to replace the advice of your health care provider.   Copyright   2014 [facility]. All rights reserved.   Mediterranean pyramrid used with permission of the [employer]. [employer] [id number] - 09/15.

## 2021-11-30 DIAGNOSIS — G47.33 OBSTRUCTIVE SLEEP APNEA (ADULT) (PEDIATRIC): Primary | ICD-10-CM

## 2021-11-30 LAB
ANION GAP SERPL CALCULATED.3IONS-SCNC: 6 MMOL/L (ref 3–14)
BUN SERPL-MCNC: 14 MG/DL (ref 7–30)
CALCIUM SERPL-MCNC: 9.2 MG/DL (ref 8.5–10.1)
CHLORIDE BLD-SCNC: 106 MMOL/L (ref 94–109)
CO2 SERPL-SCNC: 27 MMOL/L (ref 20–32)
CREAT SERPL-MCNC: 0.83 MG/DL (ref 0.52–1.04)
GFR SERPL CREATININE-BSD FRML MDRD: 78 ML/MIN/1.73M2
GLUCOSE BLD-MCNC: 112 MG/DL (ref 70–99)
POTASSIUM BLD-SCNC: 4.3 MMOL/L (ref 3.4–5.3)
SODIUM SERPL-SCNC: 139 MMOL/L (ref 133–144)

## 2021-12-01 LAB
BKR LAB AP GYN ADEQUACY: NORMAL
BKR LAB AP GYN INTERPRETATION: NORMAL
BKR LAB AP HPV REFLEX: NORMAL
BKR LAB AP LMP: NORMAL
BKR LAB AP PREVIOUS ABNORMAL: NORMAL
PATH REPORT.COMMENTS IMP SPEC: NORMAL
PATH REPORT.COMMENTS IMP SPEC: NORMAL
PATH REPORT.RELEVANT HX SPEC: NORMAL

## 2021-12-02 LAB
CREAT UR-MCNC: 140 MG/DL
MICROALBUMIN UR-MCNC: 8 MG/L
MICROALBUMIN/CREAT UR: 5.71 MG/G CR (ref 0–25)

## 2021-12-06 LAB
HUMAN PAPILLOMA VIRUS 16 DNA: NEGATIVE
HUMAN PAPILLOMA VIRUS 18 DNA: NEGATIVE
HUMAN PAPILLOMA VIRUS FINAL DIAGNOSIS: NORMAL
HUMAN PAPILLOMA VIRUS OTHER HR: NEGATIVE

## 2022-01-31 NOTE — Clinical Note
Chief Complaint  Cough (had covid, last day was Dec 25.  Coughing so much its making her back hert, and keeping her awake.) and Shortness of Breath    Subjective         Shanthi Akers presents to [facility] for Follow-up post COVID-19, cough, shortness of breath, inability to sleep.    Patient is here today, she was diagnosed with Covid in mid December, she was recently seen 12/14/2021 in the ER, they diagnosed with shortness of breath and panic attack, they did do a Covid test, the next day it did come back positive.  Patient was managing her symptoms at home, they were getting worse around 1228, she was seen in the office then, there was acute concern for possible pneumonia and PE.  She was sent by her PCP to the emergency room, work-up was performed in the ER, PE ruled out, she did have viral pneumonia.  She did go ahead and take cefdinir and Augmentin as previously prescribed.  She was given steroids.  She has been prescribed Bromfed and Tessalon Perles for cough, she reports that Bromfed does give her relief from the cough however it does make her jittery, so she does not like using it during the day.  She has tried Tessalon, she states that they have not worked at all.  She has tried several over-the-counter remedies, none of which are working.  She is still having persistent dry cough, she reports some shortness of breath when she is having a bad coughing spell, and she is not resting well at night due to the cough.  Otherwise, she is not having any other symptoms that are lingering post Covid.  She has no other significant concerns or complaints today.         Review of Systems   Constitutional: Negative for chills, fatigue and fever.   HENT: Negative for rhinorrhea and sore throat.    Respiratory: Positive for cough and shortness of breath. Negative for wheezing.    Cardiovascular: Negative for chest pain and palpitations.   Gastrointestinal: Negative for diarrhea, nausea and  HST is scored and ready for interpretation. AHI: 41.9 Thank Troy "vomiting.   Musculoskeletal: Negative for myalgias.   Skin: Negative for rash.   Neurological: Negative for headaches.       Objective   Vital Signs:   /70   Pulse 107   Temp 96.1 °F (35.6 °C) (Temporal)   Resp 14   Ht 170.2 cm (67\")   Wt 83.5 kg (184 lb)   SpO2 99%   BMI 28.82 kg/m²     Physical Exam  Vitals and nursing note reviewed.   Constitutional:       Appearance: Normal appearance. She is normal weight.   HENT:      Head: Normocephalic and atraumatic.      Nose: Nose normal.      Mouth/Throat:      Mouth: Mucous membranes are moist.      Pharynx: Oropharynx is clear.   Eyes:      Conjunctiva/sclera: Conjunctivae normal.      Pupils: Pupils are equal, round, and reactive to light.   Cardiovascular:      Rate and Rhythm: Normal rate.   Pulmonary:      Effort: Pulmonary effort is normal.      Breath sounds: Normal breath sounds.   Neurological:      Mental Status: She is alert.   Psychiatric:         Mood and Affect: Mood normal.         Thought Content: Thought content normal.        Result Review :                   Diagnoses and all orders for this visit:    1. COVID-19 (Primary)  Assessment & Plan:  Patient still has lingering cough, she has no other significant symptoms, physical exam reassuring.  I would not recommend repeating x-ray yet, if symptoms are not resolving in 2 weeks, I did recommend we do this at that time.  I will send in Promethazine DM, see if this provides the patient with any additional relief without the jitteriness.  Patient agrees to this plan for now.  If not doing better in the next few days with medication, did advise her to call and let us know.      2. Cough    Other orders  -     promethazine-dextromethorphan (PROMETHAZINE-DM) 6.25-15 MG/5ML syrup; Take 5 mL by mouth Every 6 (Six) Hours As Needed for Cough.  Dispense: 240 mL; Refill: 0        Follow Up   Return if symptoms worsen or fail to improve.  Patient was given instructions and counseling regarding her " condition or for health maintenance advice. Please see specific information pulled into the AVS if appropriate.     Joshua Hernandez, AMANDA  1/31/2022  This note was electronically signed.

## 2022-02-03 ENCOUNTER — ANCILLARY PROCEDURE (OUTPATIENT)
Dept: MAMMOGRAPHY | Facility: CLINIC | Age: 57
End: 2022-02-03
Attending: FAMILY MEDICINE
Payer: COMMERCIAL

## 2022-02-03 DIAGNOSIS — Z00.00 ROUTINE HISTORY AND PHYSICAL EXAMINATION OF ADULT: ICD-10-CM

## 2022-02-03 PROCEDURE — 77067 SCR MAMMO BI INCL CAD: CPT | Mod: TC | Performed by: RADIOLOGY

## 2022-02-03 PROCEDURE — 77063 BREAST TOMOSYNTHESIS BI: CPT | Mod: TC | Performed by: RADIOLOGY

## 2022-02-23 ENCOUNTER — MYC MEDICAL ADVICE (OUTPATIENT)
Dept: FAMILY MEDICINE | Facility: CLINIC | Age: 57
End: 2022-02-23
Payer: COMMERCIAL

## 2022-02-23 DIAGNOSIS — M76.821 POSTERIOR TIBIAL TENDINITIS OF RIGHT LOWER EXTREMITY: Primary | ICD-10-CM

## 2022-03-25 ENCOUNTER — THERAPY VISIT (OUTPATIENT)
Dept: PHYSICAL THERAPY | Facility: CLINIC | Age: 57
End: 2022-03-25
Attending: FAMILY MEDICINE
Payer: COMMERCIAL

## 2022-03-25 DIAGNOSIS — M25.571 PAIN IN JOINT INVOLVING ANKLE AND FOOT, RIGHT: ICD-10-CM

## 2022-03-25 DIAGNOSIS — M76.821 POSTERIOR TIBIAL TENDINITIS OF RIGHT LOWER EXTREMITY: ICD-10-CM

## 2022-03-25 PROCEDURE — 97140 MANUAL THERAPY 1/> REGIONS: CPT | Mod: GP | Performed by: PHYSICAL THERAPIST

## 2022-03-25 PROCEDURE — 97161 PT EVAL LOW COMPLEX 20 MIN: CPT | Mod: GP | Performed by: PHYSICAL THERAPIST

## 2022-03-25 NOTE — PROGRESS NOTES
[facility]    OUTPATIENT Physical Therapy ORTHOPEDIC EVALUATION  PLAN OF TREATMENT FOR OUTPATIENT REHABILITATION  (COMPLETE FOR INITIAL CLAIMS ONLY)  Patient's Last Name, First Name, M.I.  YOB: 1965  Katharina Rodríguez    Provider s Name:  [facility]   Medical Record No.  [id number]   Start of Care Date:  03/25/22   Onset Date:   02/28/22 (MD order)   Type:     _X__PT   ___OT Medical Diagnosis:    Encounter Diagnosis   Name Primary?     Posterior tibial tendinitis of right lower extremity         Treatment Diagnosis:  Right Posterior Tib Tendinitis         Goals:     03/25/22 0500   Body Part   Goals listed below are for Ankle    Goal #1   Goal #1 ambulation   Previous Functional Level No restrictions   Current Functional Level Miles patient can walk   Performance Level 1/2 mile with pain in ankle   STG Target Performance Miles patient will be able to  walk   Performance Level 1 mile pain </310   Rationale for safe household ambulation;for safe outdoor household ambulation;for safe community ambulation;for safe work place ambulation;to promote a healthy and active lifestyle   Due Date 04/22/22    LTG [employer] Performance Miles patient will be able to  walk   Performance Level 2 miles pain <3/10   Rationale for safe household ambulation;for safe outdoor household ambulation;for safe community ambulation;for safe work place ambulation;to promote a healthy and active lifestyle   Due Date 05/20/22       Therapy Frequency:  1x/week   Predicted Duration of Therapy Intervention:  8 weeks    GRANT PARIKH, PT                 I CERTIFY THE NEED FOR THESE SERVICES FURNISHED UNDER        THIS PLAN OF TREATMENT AND WHILE UNDER MY CARE     (Physician attestation of this document indicates review and certification of the therapy plan).                        Certification Date From:  03/25/22   Certification Date To:  05/20/22    Referring Provider:  Becca Paula    Initial Assessment        See Epic Evaluation SOC Date: 03/25/22

## 2022-03-25 NOTE — PROGRESS NOTES
Physical Therapy Initial Evaluation  Subjective:  The history is provided by the patient. No  was used.   Patient Health History  Katharina Rodríguez being seen for R ankle .     Problem began: 10/18/2021.   Problem occurred: increased activity up/down steps    Pain is reported as 6/10 on pain scale.  General health as reported by patient is fair.  Pertinent medical history includes: high blood pressure, migraines/headaches, overweight and sleep disorder/apnea. Other medical history details: bunions .   Red flags:  None as reported by patient.  Medical allergies: none.   Surgeries include:  None.    Current medications:  High blood pressure medication.    Current occupation is Massage Therapist .   Primary job tasks include:  Driving, prolonged standing, lifting/carrying, pushing/pulling and repetitive tasks.                  Therapist Generated HPI Evaluation  Problem details: The patient presents to therapy with a chief complaint of R ankle pain. Was diagnosed with pain in 2019 and this improved. She reinjured it in October and it hasn't really improved. 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 The patient reports that walking is slightly better than standing in one position. Walking <1 mile at a time. .         Type of problem:  Right ankle.    This is a chronic condition.  Condition occurred with:  Repetition/overuse.    Patient reports pain:  Medial.  Pain is described as aching and sharp and is intermittent.  Pain is the same all the time.  Since onset symptoms are gradually worsening.  Associated symptoms:  Loss of strength and loss of motion/stiffness. Symptoms are exacerbated by walking, descending stairs, ascending stairs and standing  and relieved by rest, ice and bracing/immobilizing.    Previous treatment includes physical therapy. There was mild improvement following previous treatment.  Restrictions due to  condition include:  Working in normal job without restrictions.  Barriers include:  None as reported by patient.                        Objective:  System    Ankle/Foot Evaluation  ROM:  Arom ankle eval: left ankle WNL.  AROM:    Dorsiflexion: Left:    Right:   Reaches 0 eg with tightness  Plantarflexion: Left:     Right:  WNL paifnree  Inversion: Left:      Right:  20 deg with P+  Eversion:     Right:  10 deg P+  Great toe flexion: Left:      Right:  WNL  Great Toe Extension: Left:      Right: WNL     Strength:    Dorsiflexion:  Left: 5/5     Pain:   Right: 4/5   Pain:  Plantarflexion: Left: 4+/5   Pain:   Right: 3+/5   Pain:+  Inversion:Left: 5/5  Pain:     Right: 3+/5   Pain:+  Eversion:Left: 5/5  Pain:  Right: 4/5   Pain:+  Flexion Great Toe:Left: 5/5  Pain:  Right: 4/5   Pain:  Extension Great Toe:Left: 5/5  Pain:  Right: 4/5  Pain:                  PALPATION:     Right ankle tenderness present at:   gastroc/soleus; achilles tendon and plantar fascia                                                        General     ROS    Assessment/Plan:    Patient is a 56 year old female with right side ankle complaints.    Patient has the following significant findings with corresponding treatment plan.                Diagnosis 1:  Right Tib Post Tendinitis  Pain -  hot/cold therapy, US, electric stimulation, manual therapy, splint/taping/bracing/orthotics and home program  Decreased ROM/flexibility - manual therapy and therapeutic exercise  Decreased joint mobility - manual therapy and therapeutic exercise  Decreased strength - therapeutic exercise and therapeutic activities  Impaired balance - neuro re-education and therapeutic activities  Impaired gait - gait training  Decreased function - therapeutic activities    Cumulative Therapy Evaluation is: Low complexity.    Previous and current functional limitations:  (See Goal Flow Sheet for this information)    Short term and Long term goals: (See Goal Flow Sheet for this  information)     Communication ability:  Patient appears to be able to clearly communicate and understand verbal and written communication and follow directions correctly.  Treatment Explanation - The following has been discussed with the patient:   RX ordered/plan of care  Anticipated outcomes  Possible risks and side effects  This patient would benefit from PT intervention to resume normal activities.   Rehab potential is good.    Frequency:  1 X week, once daily  Duration:  for 8 weeks  Discharge Plan:  Achieve all LTG.  Independent in home treatment program.  Reach maximal therapeutic benefit.    Please refer to the daily flowsheet for treatment today, total treatment time and time spent performing 1:1 timed codes.

## 2022-04-05 ENCOUNTER — THERAPY VISIT (OUTPATIENT)
Dept: PHYSICAL THERAPY | Facility: CLINIC | Age: 57
End: 2022-04-05
Payer: COMMERCIAL

## 2022-04-05 DIAGNOSIS — M25.571 PAIN IN JOINT INVOLVING ANKLE AND FOOT, RIGHT: ICD-10-CM

## 2022-04-05 PROCEDURE — 97140 MANUAL THERAPY 1/> REGIONS: CPT | Mod: GP | Performed by: PHYSICAL THERAPIST

## 2022-04-05 PROCEDURE — 97110 THERAPEUTIC EXERCISES: CPT | Mod: GP | Performed by: PHYSICAL THERAPIST

## 2022-04-05 PROCEDURE — 97112 NEUROMUSCULAR REEDUCATION: CPT | Mod: GP | Performed by: PHYSICAL THERAPIST

## 2022-05-13 NOTE — PROGRESS NOTES
Discharge Note    Progress reporting period is from initial evaluation date (please see noted date below) to Apr 5, 2022.  Linked Episodes   Type: Episode: Status: Noted: Resolved: Last update: Updated by:   PHYSICAL THERAPY Right Ankle Active 3/25/2022  4/5/2022  9:26 AM Tsering Locke, PT      Comments:       Katharina failed to follow up and current status is unknown.  Please see information below for last relevant information on current status.  Patient seen for 2 visits.    SUBJECTIVE  Subjective changes noted by patient:  Feels it is a little better.  When patient is working standing 2-4 hours has improved.  Patient still gettin arch pain and some pain around the top of the foot. Patient feels the toe spacer has really helped.   .  Current pain level is  .     Previous pain level was   .   Changes in function:  Yes (See Goal flowsheet attached for changes in current functional level)  Adverse reaction to treatment or activity: None    OBJECTIVE  Changes noted in objective findings: tightness and pain to palpation, posterior tib and anterior tib.      ASSESSMENT/PLAN  Diagnosis: Right Posterior Tib Tendinitis    Updated problem list and treatment pPain - HEP  Decreased function - HEP  Inflammation - HEPlan:     STG/LTGs have been met or progress has been made towards goals:  Yes, please see goal flowsheet for most current information  Assessment of Progress: current status is unknown.    Last current status: Pt is progressing as expected   Self Management Plans:  HEP  I have re-evaluated this patient and find that the nature, scope, duration and intensity of the therapy is appropriate for the medical condition of the patient.  Katharina continues to require the following intervention to meet STG and LTG's:  HEP.    Recommendations:  Discharge with current home program.  Patient to follow up with MD as needed.    Please refer to the daily flowsheet for treatment today, total treatment time and time  spent performing 1:1 timed codes.

## 2022-06-10 ENCOUNTER — OFFICE VISIT (OUTPATIENT)
Dept: URGENT CARE | Facility: URGENT CARE | Age: 57
End: 2022-06-10
Payer: COMMERCIAL

## 2022-06-10 VITALS
SYSTOLIC BLOOD PRESSURE: 120 MMHG | OXYGEN SATURATION: 98 % | DIASTOLIC BLOOD PRESSURE: 78 MMHG | HEART RATE: 78 BPM | RESPIRATION RATE: 20 BRPM | TEMPERATURE: 98 F

## 2022-06-10 DIAGNOSIS — N76.0 VAGINITIS AND VULVOVAGINITIS: Primary | ICD-10-CM

## 2022-06-10 LAB
CLUE CELLS: ABNORMAL
TRICHOMONAS, WET PREP: ABNORMAL
WBC'S/HIGH POWER FIELD, WET PREP: ABNORMAL
YEAST, WET PREP: ABNORMAL

## 2022-06-10 PROCEDURE — 87210 SMEAR WET MOUNT SALINE/INK: CPT | Performed by: FAMILY MEDICINE

## 2022-06-10 PROCEDURE — 99213 OFFICE O/P EST LOW 20 MIN: CPT | Performed by: FAMILY MEDICINE

## 2022-06-10 NOTE — PATIENT INSTRUCTIONS
Apply 1% Hydrocortisone cream onto the lesions of the genitalia twice a day.  Do not exceed 14 days of use.      Follow up with your primary care provider if not better in 7-10 days.

## 2022-06-10 NOTE — PROGRESS NOTES
SUBJECTIVE:   Katharina Rodríguez is a 56 year old female presenting with a chief complaint of one day of fevers (up to 101.4 F) 9 days ago.     She has had tender bumps at the vaginal region for the past three days.  No discharge/pus.  She has tried Monistat-3 for the past five days without any relief.      Patient was exposed to other family members with COVID-19 in late May 2022.  Patient's latest COVID-19 at-home test from five days ago was negative.  She had had four negative COVID-19 tests before that test,      No cough  No shortness of breath  No loss of smell/taste.    Patient did have four days of headaches (at the posterior and lateral head).    No bluish lips/toes/fingers  No chest pain  No vomiting  No diarrhea  No abdominal pain  No kidney pain  No neck stiffness  No rashes  No nasal symptoms  No severe sore throat  No pain with urination  No urinary urgency.    No blood in the urine  No cloudy urine.    .    Past Medical History:   Diagnosis Date     Acute midline low back pain without sciatica 09/2021    this has occurred one other time - would like PT in future if this occurs again     Cervical high risk HPV (human papillomavirus) test positive 11/2015,11/09/16    + HPV (not 16 or 18)     History of colposcopy with cervical biopsy 02/07/2017 02/07/17: [place] Bx's LSIL DEVYN 1.      Hypertension      Posterior tibial tendinitis of right lower extremity 2019     Current Outpatient Medications   Medication Sig Dispense Refill     atenolol (TENORMIN) 50 MG tablet Take 1 tablet (50 mg) by mouth daily 90 tablet 4     lactobacillus rhamnosus, GG, (CULTURELL) capsule Take 1 capsule by mouth 2 times daily       lisinopril (ZESTRIL) 10 MG tablet TAKE 1 TABLET BY MOUTH DAILY TAKE DAILY (ALONG WITH ATENOLOL) 90 tablet 4     Multiple Vitamin (MULTIVITAMIN OR) Take 1 capsule by mouth daily.       Vitamin D, Cholecalciferol, 1000 units CAPS Take 1 capsule by mouth       Social History     Tobacco Use     Smoking  status: Never Smoker     Smokeless tobacco: Never Used   Substance Use Topics     Alcohol use: Yes     Comment: social        ROS:  CONSTITUTIONAL:positive for one day of fever 9 days ago.    :  Positive for soreness at the genital region.      OBJECTIVE:  /78   Pulse 78   Temp 98  F (36.7  C) (Tympanic)   Resp 20   LMP 05/01/2015 (Approximate)   SpO2 98%   GENERAL APPEARANCE: healthy, alert and no distress  HENT: ear canals and TM's normal.  Nose and mouth without ulcers, erythema or lesions  NECK: supple, nontender, no lymphadenopathy..    RESP: lungs clear to auscultation - no rales, rhonchi or wheezes  CV: regular rates and rhythm, normal S1 S2, no murmur noted  ABDOMEN:  soft, nontender, no HSM or masses and bowel sounds normal .  No rebound/guarding/distension.    BACK:  No costovertebral angle pain with percussion.    GU_female: No abnormal lesions/erythema/nodules/pustules/vesicles were seen over the labia majora and labia minora (bilateral).    LAB:    Results for orders placed or performed in visit on 06/10/22   Wet prep - Clinic Collect     Status: Abnormal    Specimen: Vagina; Swab   Result Value Ref Range    Trichomonas Absent Absent    Yeast Absent Absent    Clue Cells Absent Absent    WBCs/high power field 2+ (A) None           ASSESSMENT:  Vaginitis and vulvovaginitis.  I was unable to find obvious abscess/cellulitis/pustules/vesicles.  The Wet prep was mostly negative.      PLAN:  Apply 1% Hydrocortisone cream onto the lesions of the genitalia twice a day.  Do not exceed 14 days of use.      Follow up with your primary care provider if not better in 7-10 days.     Hugo Mora MD

## 2022-10-15 ENCOUNTER — HEALTH MAINTENANCE LETTER (OUTPATIENT)
Age: 57
End: 2022-10-15

## 2022-12-02 DIAGNOSIS — I10 BENIGN HYPERTENSION: ICD-10-CM

## 2022-12-02 DIAGNOSIS — I47.10 SVT (SUPRAVENTRICULAR TACHYCARDIA) (H): ICD-10-CM

## 2022-12-03 DIAGNOSIS — I10 BENIGN HYPERTENSION: ICD-10-CM

## 2022-12-05 RX ORDER — LISINOPRIL 10 MG/1
TABLET ORAL
Qty: 90 TABLET | Refills: 2 | OUTPATIENT
Start: 2022-12-05

## 2022-12-05 RX ORDER — ATENOLOL 50 MG/1
TABLET ORAL
Qty: 90 TABLET | Refills: 4 | OUTPATIENT
Start: 2022-12-05

## 2022-12-05 NOTE — TELEPHONE ENCOUNTER
Routing refill request to provider for review/approval because:  Labs not current:  BMP  Patient needs to be seen because it has been more than 1 year since last office visit.    Urvashi Coombs RN

## 2022-12-05 NOTE — TELEPHONE ENCOUNTER
Too soon - lisinopril (ZESTRIL) 10 MG tablet 90 tablet 4 11/29/2021     Too soon - atenolol 50 MG tablet 90 tablet 4 11/29/2021    Julia Franco RN

## 2022-12-06 RX ORDER — LISINOPRIL 10 MG/1
TABLET ORAL
Qty: 90 TABLET | Refills: 0 | Status: SHIPPED | OUTPATIENT
Start: 2022-12-06 | End: 2023-02-01

## 2022-12-06 NOTE — TELEPHONE ENCOUNTER
Pt calls, scheduled appointment, needs BP refill  [facility]  Minerva Saxena RN, BSN  [facility]

## 2022-12-07 DIAGNOSIS — I10 BENIGN HYPERTENSION: ICD-10-CM

## 2022-12-07 DIAGNOSIS — I47.10 SVT (SUPRAVENTRICULAR TACHYCARDIA) (H): ICD-10-CM

## 2022-12-12 ENCOUNTER — MYC MEDICAL ADVICE (OUTPATIENT)
Dept: FAMILY MEDICINE | Facility: CLINIC | Age: 57
End: 2022-12-12

## 2022-12-12 RX ORDER — ATENOLOL 50 MG/1
TABLET ORAL
Qty: 90 TABLET | Refills: 4 | OUTPATIENT
Start: 2022-12-12

## 2022-12-13 DIAGNOSIS — I47.10 SVT (SUPRAVENTRICULAR TACHYCARDIA) (H): ICD-10-CM

## 2022-12-13 DIAGNOSIS — I10 BENIGN HYPERTENSION: ICD-10-CM

## 2022-12-13 RX ORDER — ATENOLOL 50 MG/1
50 TABLET ORAL DAILY
Qty: 90 TABLET | Refills: 0 | Status: SHIPPED | OUTPATIENT
Start: 2022-12-13 | End: 2023-02-01

## 2022-12-13 NOTE — TELEPHONE ENCOUNTER
Patient scheduled appointment with Dr. Paula in earliest available on 2/1/22.     Maria Fenranda refill to get patient through to appointment.     Valeria ODONNELL RN, BSN, PHN  [facility]

## 2022-12-13 NOTE — TELEPHONE ENCOUNTER
Maria Fernanda allen sent. Informed patient via ZAP.     Valeria ODONNELL RN, BSN, PHN  [facility]

## 2022-12-15 DIAGNOSIS — G47.33 OBSTRUCTIVE SLEEP APNEA (ADULT) (PEDIATRIC): Primary | ICD-10-CM

## 2023-02-01 ENCOUNTER — OFFICE VISIT (OUTPATIENT)
Dept: FAMILY MEDICINE | Facility: CLINIC | Age: 58
End: 2023-02-01
Payer: COMMERCIAL

## 2023-02-01 VITALS
BODY MASS INDEX: 27.15 KG/M2 | HEART RATE: 70 BPM | RESPIRATION RATE: 14 BRPM | TEMPERATURE: 97.9 F | DIASTOLIC BLOOD PRESSURE: 80 MMHG | WEIGHT: 173 LBS | OXYGEN SATURATION: 98 % | SYSTOLIC BLOOD PRESSURE: 144 MMHG | HEIGHT: 67 IN

## 2023-02-01 DIAGNOSIS — I47.10 SVT (SUPRAVENTRICULAR TACHYCARDIA) (H): ICD-10-CM

## 2023-02-01 DIAGNOSIS — Z00.00 ROUTINE GENERAL MEDICAL EXAMINATION AT A HEALTH CARE FACILITY: Primary | ICD-10-CM

## 2023-02-01 DIAGNOSIS — G47.33 OSA (OBSTRUCTIVE SLEEP APNEA): ICD-10-CM

## 2023-02-01 DIAGNOSIS — R73.9 ELEVATED BLOOD SUGAR: ICD-10-CM

## 2023-02-01 DIAGNOSIS — I10 BENIGN HYPERTENSION: ICD-10-CM

## 2023-02-01 DIAGNOSIS — E78.5 HYPERLIPIDEMIA LDL GOAL <130: ICD-10-CM

## 2023-02-01 LAB — HBA1C MFR BLD: 5.8 % (ref 0–5.6)

## 2023-02-01 PROCEDURE — 86141 C-REACTIVE PROTEIN HS: CPT | Performed by: FAMILY MEDICINE

## 2023-02-01 PROCEDURE — 36415 COLL VENOUS BLD VENIPUNCTURE: CPT | Performed by: FAMILY MEDICINE

## 2023-02-01 PROCEDURE — 90677 PCV20 VACCINE IM: CPT | Performed by: FAMILY MEDICINE

## 2023-02-01 PROCEDURE — 99396 PREV VISIT EST AGE 40-64: CPT | Mod: 25 | Performed by: FAMILY MEDICINE

## 2023-02-01 PROCEDURE — 99214 OFFICE O/P EST MOD 30 MIN: CPT | Mod: 25 | Performed by: FAMILY MEDICINE

## 2023-02-01 PROCEDURE — 83036 HEMOGLOBIN GLYCOSYLATED A1C: CPT | Performed by: FAMILY MEDICINE

## 2023-02-01 PROCEDURE — 80061 LIPID PANEL: CPT | Performed by: FAMILY MEDICINE

## 2023-02-01 PROCEDURE — 90471 IMMUNIZATION ADMIN: CPT | Performed by: FAMILY MEDICINE

## 2023-02-01 PROCEDURE — 80053 COMPREHEN METABOLIC PANEL: CPT | Performed by: FAMILY MEDICINE

## 2023-02-01 PROCEDURE — 84443 ASSAY THYROID STIM HORMONE: CPT | Performed by: FAMILY MEDICINE

## 2023-02-01 RX ORDER — ATENOLOL 50 MG/1
50 TABLET ORAL DAILY
Qty: 90 TABLET | Refills: 4 | Status: SHIPPED | OUTPATIENT
Start: 2023-02-01 | End: 2023-02-01

## 2023-02-01 RX ORDER — ATENOLOL 100 MG/1
100 TABLET ORAL DAILY
Qty: 90 TABLET | Refills: 2 | Status: SHIPPED | OUTPATIENT
Start: 2023-02-01 | End: 2023-12-04

## 2023-02-01 RX ORDER — LISINOPRIL 10 MG/1
TABLET ORAL
Qty: 90 TABLET | Refills: 2 | Status: SHIPPED | OUTPATIENT
Start: 2023-02-01 | End: 2023-12-04

## 2023-02-01 SDOH — ECONOMIC STABILITY: INCOME INSECURITY: HOW HARD IS IT FOR YOU TO PAY FOR THE VERY BASICS LIKE FOOD, HOUSING, MEDICAL CARE, AND HEATING?: NOT VERY HARD

## 2023-02-01 SDOH — HEALTH STABILITY: PHYSICAL HEALTH: ON AVERAGE, HOW MANY DAYS PER WEEK DO YOU ENGAGE IN MODERATE TO STRENUOUS EXERCISE (LIKE A BRISK WALK)?: 1 DAY

## 2023-02-01 SDOH — ECONOMIC STABILITY: FOOD INSECURITY: WITHIN THE PAST 12 MONTHS, THE FOOD YOU BOUGHT JUST DIDN'T LAST AND YOU DIDN'T HAVE MONEY TO GET MORE.: NEVER TRUE

## 2023-02-01 SDOH — ECONOMIC STABILITY: FOOD INSECURITY: WITHIN THE PAST 12 MONTHS, YOU WORRIED THAT YOUR FOOD WOULD RUN OUT BEFORE YOU GOT MONEY TO BUY MORE.: NEVER TRUE

## 2023-02-01 SDOH — ECONOMIC STABILITY: INCOME INSECURITY: IN THE LAST 12 MONTHS, WAS THERE A TIME WHEN YOU WERE NOT ABLE TO PAY THE MORTGAGE OR RENT ON TIME?: NO

## 2023-02-01 SDOH — HEALTH STABILITY: PHYSICAL HEALTH: ON AVERAGE, HOW MANY MINUTES DO YOU ENGAGE IN EXERCISE AT THIS LEVEL?: 20 MIN

## 2023-02-01 ASSESSMENT — ENCOUNTER SYMPTOMS: BREAST MASS: 0

## 2023-02-01 ASSESSMENT — SOCIAL DETERMINANTS OF HEALTH (SDOH)
HOW OFTEN DO YOU GET TOGETHER WITH FRIENDS OR RELATIVES?: MORE THAN THREE TIMES A WEEK
IN A TYPICAL WEEK, HOW MANY TIMES DO YOU TALK ON THE PHONE WITH FAMILY, FRIENDS, OR NEIGHBORS?: MORE THAN THREE TIMES A WEEK
DO YOU BELONG TO ANY CLUBS OR ORGANIZATIONS SUCH AS CHURCH GROUPS UNIONS, FRATERNAL OR ATHLETIC GROUPS, OR SCHOOL GROUPS?: NO
HOW OFTEN DO YOU ATTEND CHURCH OR RELIGIOUS SERVICES?: NEVER

## 2023-02-01 ASSESSMENT — LIFESTYLE VARIABLES
HOW OFTEN DO YOU HAVE A DRINK CONTAINING ALCOHOL: 2-4 TIMES A MONTH
HOW MANY STANDARD DRINKS CONTAINING ALCOHOL DO YOU HAVE ON A TYPICAL DAY: 1 OR 2
SKIP TO QUESTIONS 9-10: 1
AUDIT-C TOTAL SCORE: 2
HOW OFTEN DO YOU HAVE SIX OR MORE DRINKS ON ONE OCCASION: NEVER

## 2023-02-01 NOTE — PATIENT INSTRUCTIONS
You may schedule your mammogram at [facility] by calling [phone] and asking to schedule your mammogram or you may schedule with [facility] in [place] by calling [phone].     Preventive Health Recommendations  Female Ages 50 - 64    Yearly exam: See your health care provider every year in order to  Review health changes.   Discuss preventive care.    Review your medicines if your doctor has prescribed any.    Get a Pap test every three years (unless you have an abnormal result and your provider advises testing more often).  If you get Pap tests with HPV test, you only need to test every 5 years, unless you have an abnormal result.   You do not need a Pap test if your uterus was removed (hysterectomy) and you have not had cancer.  You should be tested each year for STDs (sexually transmitted diseases) if you're at risk.   Have a mammogram every 1 to 2 years.  Have a colonoscopy at age 50, or have a yearly FIT test (stool test). These exams screen for colon cancer.    Have a cholesterol test every 5 years, or more often if advised.  Have a diabetes test (fasting glucose) every three years. If you are at risk for diabetes, you should have this test more often.   If you are at risk for osteoporosis (brittle bone disease), think about having a bone density scan (DEXA).    Shots: Get a flu shot each year. Get a tetanus shot every 10 years.    Nutrition:   Eat at least 5 servings of fruits and vegetables each day.  Eat whole-grain bread, whole-wheat pasta and brown rice instead of white grains and rice.  Get adequate Calcium and Vitamin D.     Lifestyle  Exercise at least 150 minutes a week (30 minutes a day, 5 days a week). This will help you control your weight and prevent disease.  Limit alcohol to one drink per day.  No smoking.   Wear sunscreen to prevent skin cancer.   See your dentist every six months for an exam and cleaning.  See your eye doctor every 1 to 2  years.

## 2023-02-01 NOTE — PROGRESS NOTES
SUBJECTIVE:   CC: Katharina is an 57 year old who presents for preventive health visit.     She is here for well visit.   She has high blood pressure.   It has been good for some time now.   She was on beta blocker first for hypertension and SVT.    It worked well but lately she is having a little more of the heart palpitations.   They are the same as before.   She does not have chest pain.       Patient has been advised of split billing requirements and indicates understanding: Yes  Healthy Habits:     Getting at least 3 servings of Calcium per day:  NO    Bi-annual eye exam:  Yes    Dental care twice a year:  Yes    Sleep apnea or symptoms of sleep apnea:  Sleep apnea    Diet:  Regular (no restrictions)    Frequency of exercise:  None    Taking medications regularly:  Yes    PHQ-2 Total Score: 0    Additional concerns today:  No      Today's PHQ-2 Score:   PHQ-2 ( 1999 [employer]) 2/1/2023   Q1: Little interest or pleasure in doing things 0   Q2: Feeling down, depressed or hopeless 0   PHQ-2 Score 0   PHQ-2 Total Score (12-17 Years)- Positive if 3 or more points; Administer PHQ-A if positive -   Q1: Little interest or pleasure in doing things Not at all   Q2: Feeling down, depressed or hopeless Not at all   PHQ-2 Score 0       Social History     Tobacco Use     Smoking status: Never     Smokeless tobacco: Never   Substance Use Topics     Alcohol use: Yes     Comment: social        Alcohol Use 2/1/2023   Prescreen: >3 drinks/day or >7 drinks/week? No   Prescreen: >3 drinks/day or >7 drinks/week? -       Reviewed orders with patient.  Reviewed health maintenance and updated orders accordingly - Yes  Labs reviewed in EPIC    Breast Cancer Screening:    Breast CA Risk Assessment (FHS-7) 11/29/2021   Do you have a family history of breast, colon, or ovarian cancer? No / Unknown         Mammogram Screening: Recommended annual mammography  Pertinent mammograms are reviewed under the imaging tab.    History of abnormal Pap  smear: YES - updated in Problem List and Health Maintenance accordingly  PAP / HPV Latest Ref Rng & Units 11/29/2021 1/10/2018 11/9/2016   PAP   Negative for Intraepithelial Lesion or Malignancy (NILM) - -   PAP (Historical) - - NIL NIL   HPV16 Negative Negative Negative Negative   HPV18 Negative Negative Negative Negative   HRHPV Negative Negative Negative Positive(A)     Reviewed and updated as needed this visit by clinical staff                  Past Medical History:   Diagnosis Date     Acute midline low back pain without sciatica 09/2021    this has occurred one other time - would like PT in future if this occurs again     Cervical high risk HPV (human papillomavirus) test positive 11/2015,11/09/16    + HPV (not 16 or 18)     History of colposcopy with cervical biopsy 02/07/2017 02/07/17: [place] Bx's LSIL DEVYN 1.      Hypertension      NANY (obstructive sleep apnea) 2019    severe - uses CPAP     Posterior tibial tendinitis of right lower extremity 2019       Past Surgical History:   Procedure Laterality Date     COLONOSCOPY N/A 11/18/2015    RPT 10 years,  Kole Owen MD;  Location:  GI     NONE OF THE ABOVE CORE MEASURE DIAGNOSES         MEDICATIONS:  Current Outpatient Medications   Medication     atenolol (TENORMIN) 100 MG tablet     lactobacillus rhamnosus, GG, (CULTURELL) capsule     lisinopril (ZESTRIL) 10 MG tablet     Multiple Vitamin (MULTIVITAMIN OR)     Vitamin D, Cholecalciferol, 1000 units CAPS     No current facility-administered medications for this visit.       SOCIAL HISTORY:  Social History     Tobacco Use     Smoking status: Never     Smokeless tobacco: Never   Substance Use Topics     Alcohol use: Yes     Comment: social        Family History   Problem Relation Age of Onset     Hypertension Father      C.A.D. Father      Heart Disease Father      Psychotic Disorder Sister         bi polar     Movement disorder Sister         could be meds for mood           Review of Systems  "  Breasts:  Negative for tenderness, breast mass and discharge.   Genitourinary: Negative for pelvic pain, vaginal bleeding and vaginal discharge.          OBJECTIVE:   BP (!) 144/80   Pulse 70   Temp 97.9  F (36.6  C) (Oral)   Resp 14   Ht 1.702 m (5' 7\")   Wt 78.5 kg (173 lb)   LMP 05/01/2015 (Approximate)   SpO2 98%   BMI 27.10 kg/m    Physical Exam  GENERAL APPEARANCE: alert, no distress and over weight  EYES: Eyes grossly normal to inspection, PERRL and conjunctivae and sclerae normal  HENT: ear canals and TM's normal, nose and mouth without ulcers or lesions, oropharynx clear and oral mucous membranes moist  NECK: no adenopathy, no asymmetry, masses, or scars and thyroid normal to palpation  RESP: lungs clear to auscultation - no rales, rhonchi or wheezes  BREAST: normal without masses, tenderness or nipple discharge and no palpable axillary masses or adenopathy  CV: regular rate and rhythm, normal S1 S2, no S3 or S4, no murmur, click or rub, no peripheral edema and peripheral pulses strong  ABDOMEN: soft, nontender, no hepatosplenomegaly, no masses and bowel sounds normal  MS: no musculoskeletal defects are noted and gait is age appropriate without ataxia  SKIN: no suspicious lesions or rashes  NEURO: Normal strength and tone, sensory exam grossly normal, mentation intact and speech normal  PSYCH: mentation appears normal and affect normal/bright    Diagnostic Test Results:  Labs reviewed in Epic    ASSESSMENT/PLAN:   (Z00.00) Routine general medical examination at a health care facility  (primary encounter diagnosis)  Comment:   Plan: Lipid panel reflex to direct LDL Non-fasting,         Albumin Random Urine Quantitative with Creat         Ratio            (I10) Benign hypertension  Comment: not under ideal control  Plan: lisinopril (ZESTRIL) 10 MG tablet, TSH with         free T4 reflex, Comprehensive metabolic panel         (BMP + Alb, Alk Phos, ALT, AST, Total. Bili,         TP), atenolol " (TENORMIN) 100 MG tablet,         DISCONTINUED: atenolol (TENORMIN) 50 MG tablet,        CANCELED: Albumin Random Urine Quantitative         with Creat Ratio        Will increase the beta blocker since it may help with heart rhythm too  The potential side effects of the prescription medication were discussed with the patient.     (I47.1) SVT (supraventricular tachycardia) (H)  Comment:   Plan: atenolol (TENORMIN) 100 MG tablet, Albumin         Random Urine Quantitative with Creat Ratio,         DISCONTINUED: atenolol (TENORMIN) 50 MG tablet            (G47.33) NANY (obstructive sleep apnea)  Comment: stable but uses mouth/dental appliance and CPAP and cannot seem to do either alone.   Dentist said she was not to use them together   Plan: Adult Sleep Eval & Management          Referral, Albumin Random Urine Quantitative         with Creat Ratio            (E78.5) Hyperlipidemia LDL goal <130  Comment: has been running high - will also check CRP and if medium/high or high, will consider statin  Plan: CRP cardiac risk, Comprehensive metabolic panel        (BMP + Alb, Alk Phos, ALT, AST, Total. Bili,         TP), Lipid panel reflex to direct LDL         Non-fasting, Albumin Random Urine Quantitative         with Creat Ratio            (R73.9) Elevated blood sugar  Comment:   Plan: Hemoglobin A1c, Comprehensive metabolic panel         (BMP + Alb, Alk Phos, ALT, AST, Total. Bili,         TP), Albumin Random Urine Quantitative with         Creat Ratio              Patient has been advised of split billing requirements and indicates understanding: Yes      COUNSELING:  Reviewed preventive health counseling, as reflected in patient instructions  Special attention given to:        Regular exercise       Healthy diet/nutrition       Immunizations    Vaccinated for: Pneumococcal             Colorectal Cancer Screening        She reports that she has never smoked. She has never used smokeless tobacco.      Becca PHAN  MD Chai  [facility]

## 2023-02-02 LAB
ALBUMIN SERPL BCG-MCNC: 4.4 G/DL (ref 3.5–5.2)
ALP SERPL-CCNC: 94 U/L (ref 35–104)
ALT SERPL W P-5'-P-CCNC: 15 U/L (ref 10–35)
ANION GAP SERPL CALCULATED.3IONS-SCNC: 11 MMOL/L (ref 7–15)
AST SERPL W P-5'-P-CCNC: 21 U/L (ref 10–35)
BILIRUB SERPL-MCNC: <0.2 MG/DL
BUN SERPL-MCNC: 17.5 MG/DL (ref 8–23)
CALCIUM SERPL-MCNC: 9.3 MG/DL (ref 8.6–10)
CHLORIDE SERPL-SCNC: 102 MMOL/L (ref 98–107)
CHOLEST SERPL-MCNC: 248 MG/DL
CREAT SERPL-MCNC: 0.9 MG/DL (ref 0.51–0.95)
CRP SERPL HS-MCNC: 6.38 MG/L
DEPRECATED HCO3 PLAS-SCNC: 26 MMOL/L (ref 22–29)
GFR SERPL CREATININE-BSD FRML MDRD: 73 ML/MIN/1.73M2
GLUCOSE SERPL-MCNC: 86 MG/DL (ref 70–99)
HDLC SERPL-MCNC: 50 MG/DL
LDLC SERPL CALC-MCNC: 151 MG/DL
NONHDLC SERPL-MCNC: 198 MG/DL
POTASSIUM SERPL-SCNC: 4.4 MMOL/L (ref 3.4–5.3)
PROT SERPL-MCNC: 7.1 G/DL (ref 6.4–8.3)
SODIUM SERPL-SCNC: 139 MMOL/L (ref 136–145)
TRIGL SERPL-MCNC: 237 MG/DL
TSH SERPL DL<=0.005 MIU/L-ACNC: 0.89 UIU/ML (ref 0.3–4.2)

## 2023-02-07 ENCOUNTER — LAB (OUTPATIENT)
Dept: LAB | Facility: CLINIC | Age: 58
End: 2023-02-07
Payer: COMMERCIAL

## 2023-02-07 ENCOUNTER — TELEPHONE (OUTPATIENT)
Dept: FAMILY MEDICINE | Facility: CLINIC | Age: 58
End: 2023-02-07

## 2023-02-07 DIAGNOSIS — R73.9 ELEVATED BLOOD SUGAR: ICD-10-CM

## 2023-02-07 DIAGNOSIS — Z00.00 ROUTINE GENERAL MEDICAL EXAMINATION AT A HEALTH CARE FACILITY: ICD-10-CM

## 2023-02-07 DIAGNOSIS — G47.33 OSA (OBSTRUCTIVE SLEEP APNEA): ICD-10-CM

## 2023-02-07 DIAGNOSIS — E78.5 HYPERLIPIDEMIA LDL GOAL <130: ICD-10-CM

## 2023-02-07 DIAGNOSIS — I47.10 SVT (SUPRAVENTRICULAR TACHYCARDIA) (H): ICD-10-CM

## 2023-02-07 LAB
CREAT UR-MCNC: 30.5 MG/DL
MICROALBUMIN UR-MCNC: <12 MG/L
MICROALBUMIN/CREAT UR: NORMAL MG/G{CREAT}

## 2023-02-07 PROCEDURE — 82043 UR ALBUMIN QUANTITATIVE: CPT

## 2023-02-07 PROCEDURE — 82570 ASSAY OF URINE CREATININE: CPT

## 2023-02-07 NOTE — TELEPHONE ENCOUNTER
Called patient and left voicemail to call back and ask to speak to any triage nurse.    Julia Franco RN

## 2023-02-07 NOTE — TELEPHONE ENCOUNTER
----- Message from Becca Paula MD sent at 2/6/2023  6:36 PM CST -----  The labs are all good except the lipids continue to be high and the CRP shows you are in higher risk range for heart attack and stroke.   I would advise trying a statin medication to lower your risk.   Let me know with [employer] message if you are onboard and what pharmacy you would like to use

## 2023-02-08 NOTE — TELEPHONE ENCOUNTER
See Teodora response, pt wants to work on diet and exercise, routed FYI to ALDO, see Teodora message  Minerva Saxena RN, BSN  [facility]

## 2023-03-01 ENCOUNTER — ALLIED HEALTH/NURSE VISIT (OUTPATIENT)
Dept: FAMILY MEDICINE | Facility: CLINIC | Age: 58
End: 2023-03-01
Payer: COMMERCIAL

## 2023-03-01 VITALS — DIASTOLIC BLOOD PRESSURE: 70 MMHG | SYSTOLIC BLOOD PRESSURE: 128 MMHG

## 2023-03-01 DIAGNOSIS — I10 BENIGN HYPERTENSION: Primary | ICD-10-CM

## 2023-03-01 PROCEDURE — 99207 PR NO CHARGE NURSE ONLY: CPT

## 2023-03-01 NOTE — PROGRESS NOTES
Katharina Rodríguez is a 57 year old patient who comes in today for a Blood Pressure check.  Initial BP:  /70 (BP Location: Left arm, Patient Position: Sitting, Cuff Size: Adult Regular)   LMP 05/01/2015 (Approximate)      Data Unavailable  Disposition: follow-up as previously indicated by provider  Minna Ortez CMA

## 2023-12-02 DIAGNOSIS — I10 BENIGN HYPERTENSION: ICD-10-CM

## 2023-12-02 DIAGNOSIS — I47.10 SVT (SUPRAVENTRICULAR TACHYCARDIA) (H): ICD-10-CM

## 2023-12-04 RX ORDER — LISINOPRIL 10 MG/1
TABLET ORAL
Qty: 90 TABLET | Refills: 0 | Status: SHIPPED | OUTPATIENT
Start: 2023-12-04 | End: 2023-12-13

## 2023-12-04 RX ORDER — ATENOLOL 100 MG/1
100 TABLET ORAL DAILY
Qty: 90 TABLET | Refills: 0 | Status: SHIPPED | OUTPATIENT
Start: 2023-12-04 | End: 2023-12-13 | Stop reason: ALTCHOICE

## 2023-12-13 ENCOUNTER — OFFICE VISIT (OUTPATIENT)
Dept: FAMILY MEDICINE | Facility: CLINIC | Age: 58
End: 2023-12-13
Attending: FAMILY MEDICINE
Payer: COMMERCIAL

## 2023-12-13 VITALS
HEART RATE: 62 BPM | DIASTOLIC BLOOD PRESSURE: 74 MMHG | SYSTOLIC BLOOD PRESSURE: 125 MMHG | BODY MASS INDEX: 27 KG/M2 | OXYGEN SATURATION: 100 % | WEIGHT: 172 LBS | RESPIRATION RATE: 17 BRPM | HEIGHT: 67 IN | TEMPERATURE: 97.7 F

## 2023-12-13 DIAGNOSIS — I10 BENIGN HYPERTENSION: ICD-10-CM

## 2023-12-13 DIAGNOSIS — E78.5 HYPERLIPIDEMIA LDL GOAL <130: ICD-10-CM

## 2023-12-13 DIAGNOSIS — G47.33 OSA (OBSTRUCTIVE SLEEP APNEA): ICD-10-CM

## 2023-12-13 DIAGNOSIS — I47.10 SVT (SUPRAVENTRICULAR TACHYCARDIA) (H): ICD-10-CM

## 2023-12-13 DIAGNOSIS — Z23 NEED FOR HEPATITIS A VACCINATION: ICD-10-CM

## 2023-12-13 DIAGNOSIS — Z82.49 FAMILY HISTORY OF CORONARY ARTERY DISEASE: Primary | ICD-10-CM

## 2023-12-13 PROCEDURE — 90471 IMMUNIZATION ADMIN: CPT | Performed by: FAMILY MEDICINE

## 2023-12-13 PROCEDURE — 99214 OFFICE O/P EST MOD 30 MIN: CPT | Mod: 25 | Performed by: FAMILY MEDICINE

## 2023-12-13 PROCEDURE — 90472 IMMUNIZATION ADMIN EACH ADD: CPT | Performed by: FAMILY MEDICINE

## 2023-12-13 PROCEDURE — 90715 TDAP VACCINE 7 YRS/> IM: CPT | Performed by: FAMILY MEDICINE

## 2023-12-13 PROCEDURE — 90632 HEPA VACCINE ADULT IM: CPT | Performed by: FAMILY MEDICINE

## 2023-12-13 RX ORDER — CARVEDILOL 12.5 MG/1
12.5 TABLET ORAL 2 TIMES DAILY WITH MEALS
Qty: 60 TABLET | Refills: 5 | Status: SHIPPED | OUTPATIENT
Start: 2023-12-13 | End: 2024-05-08

## 2023-12-13 RX ORDER — ATENOLOL 100 MG/1
100 TABLET ORAL DAILY
Qty: 90 TABLET | Refills: 0 | Status: CANCELLED | OUTPATIENT
Start: 2023-12-13

## 2023-12-13 RX ORDER — LISINOPRIL 10 MG/1
TABLET ORAL
Qty: 90 TABLET | Refills: 3 | Status: SHIPPED | OUTPATIENT
Start: 2023-12-13

## 2023-12-13 ASSESSMENT — PAIN SCALES - GENERAL: PAINLEVEL: NO PAIN (0)

## 2023-12-13 NOTE — PROGRESS NOTES
"  Assessment & Plan     Benign hypertension  Under good control   - PRIMARY CARE FOLLOW-UP SCHEDULING  - lisinopril (ZESTRIL) 10 MG tablet  Dispense: 90 tablet; Refill: 3  - CT Coronary Calcium Scan  - Home Blood Pressure Monitor Order for DME - ONLY FOR DME  - Adult Leadless EKG Monitor 3 to 7 Days    SVT (supraventricular tachycardia)  She never knew she had this and it is not on problem list but was linked  to her atenolol - having some heart racing spells.   It has been years since any monitor has been done - wonder about a fib also     - PRIMARY CARE FOLLOW-UP SCHEDULING  - CT Coronary Calcium Scan  - carvedilol (COREG) 12.5 MG tablet  Dispense: 60 tablet; Refill: 5  - Home Blood Pressure Monitor Order for DME - ONLY FOR DME  - Adult Leadless EKG Monitor 3 to 7 Days    Family history of coronary artery disease  Discussed her risk and perhaps CAC score would help her decide on a statin     - CT Coronary Calcium Scan  - carvedilol (COREG) 12.5 MG tablet  Dispense: 60 tablet; Refill: 5  - Adult Leadless EKG Monitor 3 to 7 Days    Need for hepatitis A vaccination  Second one today   - TDAP 10-64Y (ADACEL,BOOSTRIX)  - HEPATITIS A 19+ (HAVRIX/VAQTA)    NANY (obstructive sleep apnea)  Stable  Uses CPAP and sees sleep clinic     Hyperlipidemia LDL goal <130  Not under ideal control         30 minutes spent by me on the date of the encounter doing chart review, history and exam, documentation and further activities per the note       BMI:   Estimated body mass index is 26.94 kg/m  as calculated from the following:    Height as of this encounter: 1.702 m (5' 7\").    Weight as of this encounter: 78 kg (172 lb).   Weight management plan: Discussed healthy diet and exercise guidelines    See Patient Instructions    Becca Paula MD  [facility]    Richard Posadas is a 58 year old, presenting for the following health issues:  RECHECK (Labs)  She is here to recheck her lipids and her heart " risk.   She is also here for BP recheck.   She has been on atenolol for many many years rx by previous doc.    She is not having any side effects.    She does mention that she gets 1-2 times per week a fast heart rate that can last for many hours.   She almost called 911 the other day.    She did not have chest pain.   It happens a lot at night or when on her CPAP .            12/13/2023     8:22 AM   Additional Questions   Roomed by Diamante Castro       History of Present Illness       Hyperlipidemia:  She presents for follow up of hyperlipidemia.   She is not taking medication to lower cholesterol. She is not having myalgia or other side effects to statin medications.    Hypertension: She presents for follow up of hypertension.  She does not check blood pressure  regularly outside of the clinic. Outside blood pressures have been over 140/90. She follows a low salt diet.     Vascular Disease:  She presents for follow up of vascular disease.     She never takes nitroglycerin. She is not taking daily aspirin.    She eats 4 or more servings of fruits and vegetables daily.She consumes 0 sweetened beverage(s) daily.She exercises with enough effort to increase her heart rate 20 to 29 minutes per day.  She exercises with enough effort to increase her heart rate 5 days per week.            Medication Followup of Lisinopril 10 MG    Taking Medication as prescribed: yes  Side Effects:  None  Medication Helping Symptoms:  yes      Past Medical History:   Diagnosis Date    Acute midline low back pain without sciatica 09/2021    this has occurred one other time - would like PT in future if this occurs again    Cervical high risk HPV (human papillomavirus) test positive 11/2015,11/09/16    + HPV (not 16 or 18)    History of colposcopy with cervical biopsy 02/07/2017 02/07/17: [facility] Bx's LSIL DEVYN 1.     Hypertension     NANY (obstructive sleep apnea) 2019    severe - uses CPAP    Posterior tibial tendinitis of right lower  "extremity 2019       Past Surgical History:   Procedure Laterality Date    COLONOSCOPY N/A 11/18/2015    RPT 10 years,  Kole Owen MD;  Location:  GI    NONE OF THE ABOVE CORE MEASURE DIAGNOSES         MEDICATIONS:  Current Outpatient Medications   Medication    atenolol (TENORMIN) 100 MG tablet    lactobacillus rhamnosus, GG, (CULTURELL) capsule    lisinopril (ZESTRIL) 10 MG tablet    Multiple Vitamin (MULTIVITAMIN OR)    Vitamin D, Cholecalciferol, 1000 units CAPS     No current facility-administered medications for this visit.       SOCIAL HISTORY:  Social History     Tobacco Use    Smoking status: Never    Smokeless tobacco: Never   Substance Use Topics    Alcohol use: Yes     Comment: social        Family History   Problem Relation Age of Onset    Hypertension Father     C.A.D. Father     Heart Disease Father     Psychotic Disorder Sister         bi polar    Movement disorder Sister         could be meds for mood           Review of Systems   Constitutional, HEENT, cardiovascular, pulmonary, gi and gu systems are negative, except as otherwise noted.      Objective    /74 (BP Location: Right arm, Patient Position: Sitting, Cuff Size: Adult Regular)   Pulse 62   Temp 97.7  F (36.5  C) (Oral)   Resp 17   Ht 1.702 m (5' 7\")   Wt 78 kg (172 lb)   LMP 05/01/2015 (Approximate)   SpO2 100%   Breastfeeding No   BMI 26.94 kg/m    Body mass index is 26.94 kg/m .  Physical Exam   GENERAL: healthy, alert and no distress  EYES: Eyes grossly normal to inspection, PERRL and conjunctivae and sclerae normal  NECK: no adenopathy, no asymmetry, masses, or scars and thyroid normal to palpation  RESP: lungs clear to auscultation - no rales, rhonchi or wheezes  CV: regular rate and rhythm, normal S1 S2, no S3 or S4, no murmur, click or rub, no peripheral edema and peripheral pulses strong  MS: no gross musculoskeletal defects noted, no edema  SKIN: no suspicious lesions or rashes  NEURO: Normal strength and " tone, mentation intact and speech normal  PSYCH: mentation appears normal, affect normal/bright  LYMPH: no cervical, supraclavicular, axillary, or inguinal adenopathy    Lab on 02/07/2023   Component Date Value Ref Range Status    Creatinine Urine mg/dL 02/07/2023 30.5  mg/dL Final    The reference ranges have not been established in urine creatinine. The results should be integrated into the clinical context for interpretation.    Albumin Urine mg/L 02/07/2023 <12.0  mg/L Final    The reference ranges have not been established in urine albumin. The results should be integrated into the clinical context for interpretation.    Albumin Urine mg/g Cr 02/07/2023    Final    Unable to calculate, urine albumin and/or urine creatinine is outside detectable limits.  Microalbuminuria is defined as an albumin:creatinine ratio of 17 to 299 for males and 25 to 299 for females. A ratio of albumin:creatinine of 300 or higher is indicative of overt proteinuria.  Due to biologic variability, positive results should be confirmed by a second, first-morning random or 24-hour timed urine specimen. If there is discrepancy, a third specimen is recommended. When 2 out of 3 results are in the microalbuminuria range, this is evidence for incipient nephropathy and warrants increased efforts at glucose control, blood pressure control, and institution of therapy with an angiotensin-converting-enzyme (ACE) inhibitor (if the patient can tolerate it).

## 2024-01-04 ENCOUNTER — PATIENT OUTREACH (OUTPATIENT)
Dept: CARE COORDINATION | Facility: CLINIC | Age: 59
End: 2024-01-04
Payer: COMMERCIAL

## 2024-01-08 ENCOUNTER — MYC MEDICAL ADVICE (OUTPATIENT)
Dept: FAMILY MEDICINE | Facility: CLINIC | Age: 59
End: 2024-01-08
Payer: COMMERCIAL

## 2024-01-22 ENCOUNTER — ANCILLARY PROCEDURE (OUTPATIENT)
Dept: MAMMOGRAPHY | Facility: CLINIC | Age: 59
End: 2024-01-22
Attending: FAMILY MEDICINE
Payer: COMMERCIAL

## 2024-01-22 DIAGNOSIS — Z12.31 VISIT FOR SCREENING MAMMOGRAM: ICD-10-CM

## 2024-01-22 PROCEDURE — 77067 SCR MAMMO BI INCL CAD: CPT | Mod: TC | Performed by: RADIOLOGY

## 2024-02-02 ENCOUNTER — MYC MEDICAL ADVICE (OUTPATIENT)
Dept: FAMILY MEDICINE | Facility: CLINIC | Age: 59
End: 2024-02-02
Payer: COMMERCIAL

## 2024-02-02 DIAGNOSIS — I47.10 SVT (SUPRAVENTRICULAR TACHYCARDIA) (H): Primary | ICD-10-CM

## 2024-02-02 PROCEDURE — 99207 ZIO PATCH 8-14 DAYS APPLICATION: CPT | Performed by: FAMILY MEDICINE

## 2024-02-02 NOTE — TELEPHONE ENCOUNTER
"Dr. Paula- see mychart message below and below messaging.  Looks like incorrect order is placed.  Please send correct order.  Urvashi Coombs, RN      February 1, 2024  Johanna Mcmullen   to JethroKary       2/1/24  2:24 PM  Okay, I see - I called patient to let her know.  Thank you     Kary Morelos   to Johanna Mcmullen       2/1/24  2:19 PM  Yes! However, the order that is in the patient chart is not the current order for a Zio Patch! They are sending this message over asking for a new order for the Zio Patch to be placed. The new orders will say \"Zio Patch _____\". The patient would need to talk to the ordering provider (they are within [facility]) to get an updated order for them, as primary care manages their own monitors!    Hope that makes sense!    Johanna Antonio   to Annabel Morelosline       2/1/24 11:31 AM  Herbert Alcantara,  Does the Call Center know about the Ziopatch?  Thank you       ERLINDA    2/1/24 11:19 AM  Hannah Pate routed this conversation to [facility] Scheduling Registration Pool - [facility]  Hannah Pate    2/1/24 11:18 AM  Note  [facility] Call Center     Phone Message     May a detailed message be left on voicemail: yes      Reason for Call: Other: Patient has anold order for Zio patch placement. Please update order and contact patient to schedule in [place]. Thank you      Action Taken: Other: cardiology     Travel Screening: Not Applicable  Thank you!  [facility]                                                                                                                                                                                                                                                                                                                                                                                                                  "

## 2024-02-07 ENCOUNTER — ORDERS ONLY (AUTO-RELEASED) (OUTPATIENT)
Dept: FAMILY MEDICINE | Facility: CLINIC | Age: 59
End: 2024-02-07
Payer: COMMERCIAL

## 2024-02-07 DIAGNOSIS — I47.10 SVT (SUPRAVENTRICULAR TACHYCARDIA) (H): ICD-10-CM

## 2024-02-07 DIAGNOSIS — I47.10 SVT (SUPRAVENTRICULAR TACHYCARDIA) (H): Primary | ICD-10-CM

## 2024-02-23 DIAGNOSIS — G47.33 OBSTRUCTIVE SLEEP APNEA (ADULT) (PEDIATRIC): Primary | ICD-10-CM

## 2024-03-04 ENCOUNTER — HOSPITAL ENCOUNTER (OUTPATIENT)
Dept: CT IMAGING | Facility: CLINIC | Age: 59
Discharge: HOME | End: 2024-03-04
Attending: FAMILY MEDICINE | Admitting: FAMILY MEDICINE
Payer: COMMERCIAL

## 2024-03-04 ENCOUNTER — MYC MEDICAL ADVICE (OUTPATIENT)
Dept: FAMILY MEDICINE | Facility: CLINIC | Age: 59
End: 2024-03-04

## 2024-03-04 DIAGNOSIS — E78.5 HYPERLIPIDEMIA LDL GOAL <100: Primary | ICD-10-CM

## 2024-03-04 DIAGNOSIS — I47.10 SVT (SUPRAVENTRICULAR TACHYCARDIA) (H): ICD-10-CM

## 2024-03-04 DIAGNOSIS — I10 BENIGN HYPERTENSION: ICD-10-CM

## 2024-03-04 DIAGNOSIS — Z82.49 FAMILY HISTORY OF CORONARY ARTERY DISEASE: ICD-10-CM

## 2024-03-04 LAB
CV CALCIUM SCORE AGATSTON LM: 0
CV CALCIUM SCORING AGATSON LAD: 44
CV CALCIUM SCORING AGATSTON CX: 0
CV CALCIUM SCORING AGATSTON RCA: 5
CV CALCIUM SCORING AGATSTON TOTAL: 49

## 2024-03-04 PROCEDURE — 75571 CT HRT W/O DYE W/CA TEST: CPT

## 2024-03-04 PROCEDURE — 93244 EXT ECG>48HR<7D REV&INTERPJ: CPT | Performed by: INTERNAL MEDICINE

## 2024-03-04 PROCEDURE — 75571 CT HRT W/O DYE W/CA TEST: CPT | Mod: 26 | Performed by: INTERNAL MEDICINE

## 2024-03-05 RX ORDER — ROSUVASTATIN CALCIUM 20 MG/1
20 TABLET, COATED ORAL DAILY
Qty: 90 TABLET | Refills: 1 | Status: SHIPPED | OUTPATIENT
Start: 2024-03-05 | End: 2024-05-23 | Stop reason: SINTOL

## 2024-03-05 NOTE — TELEPHONE ENCOUNTER
"Dr. Paula- see [employer] message below.  Please advise.  ARTEMIO Herbert MD  3/4/2024  4:41 PM CST       Moderate calcium buildup - especially noted in the \" maker\" coronary artery.   I do recommend a statin to reduce your risk of heart attack and stroke.    Message me back if you are willing to start and where you would like it sent.   Then we will recheck  number with lab only visit in 3 months     "

## 2024-03-17 ENCOUNTER — HEALTH MAINTENANCE LETTER (OUTPATIENT)
Age: 59
End: 2024-03-17

## 2024-04-29 ENCOUNTER — TELEPHONE (OUTPATIENT)
Dept: FAMILY MEDICINE | Facility: CLINIC | Age: 59
End: 2024-04-29
Payer: COMMERCIAL

## 2024-04-29 NOTE — TELEPHONE ENCOUNTER
Becca Paula MD  Cr [id number] minutes ago (12:50 PM)     ALDO  Lets have her go down to 1/2 tab per day and add the supplement coenzyme Q10 1-2 times per day

## 2024-04-29 NOTE — TELEPHONE ENCOUNTER
Incoming call from patient. Notified Patient of Becca Paula MD's message: Lets have her go down to 1/2 tab per day and add the supplement coenzyme Q10 1-2 times per day     Patient is wondering what dose of coenzyme Q10?    Nadiya Mcrae RN on 4/29/2024 at 2:16 PM

## 2024-04-29 NOTE — TELEPHONE ENCOUNTER
Dr Paula,     Patient was started on rosuvastatin (CRESTOR) 20 MG tablet 3/5/24.    For the last 3 weeks, patient has been having body aches; requiring tylenol and advil to get through day. Stopped medication 2 days ago and has seen improvement in symptoms so far.     Would you like to try an alternative medication/dosage change?     Susan FROST RN on 4/29/2024 at 8:47 AM

## 2024-04-29 NOTE — TELEPHONE ENCOUNTER
Message #1 left to return call to triage nurse     Anna Lemus, Registered Nurse  [facility]

## 2024-04-29 NOTE — TELEPHONE ENCOUNTER
Notified Patient of Becca Paula MD's message:   Becca Paula MD  Cr Triage5 minutes ago (3:01 PM)     ALDO  This is over the counter supplement so there is no consistent dosing.   Can take 1 or 2 capsules per day     Person was given an opportunity to ask questions, verbalized understanding of plan, and is agreeable.    Nadiya Mcrae RN on 4/29/2024 at 3:07 PM

## 2024-05-08 DIAGNOSIS — Z82.49 FAMILY HISTORY OF CORONARY ARTERY DISEASE: ICD-10-CM

## 2024-05-08 DIAGNOSIS — I47.10 SVT (SUPRAVENTRICULAR TACHYCARDIA) (H): ICD-10-CM

## 2024-05-08 RX ORDER — CARVEDILOL 12.5 MG/1
12.5 TABLET ORAL 2 TIMES DAILY WITH MEALS
Qty: 180 TABLET | Refills: 0 | Status: SHIPPED | OUTPATIENT
Start: 2024-05-08 | End: 2024-06-25

## 2024-05-20 ASSESSMENT — SLEEP AND FATIGUE QUESTIONNAIRES
HOW LIKELY ARE YOU TO NOD OFF OR FALL ASLEEP WHILE SITTING AND READING: SLIGHT CHANCE OF DOZING
HOW LIKELY ARE YOU TO NOD OFF OR FALL ASLEEP IN A CAR, WHILE STOPPED FOR A FEW MINUTES IN TRAFFIC: WOULD NEVER DOZE
HOW LIKELY ARE YOU TO NOD OFF OR FALL ASLEEP WHILE WATCHING TV: SLIGHT CHANCE OF DOZING
HOW LIKELY ARE YOU TO NOD OFF OR FALL ASLEEP WHILE LYING DOWN TO REST IN THE AFTERNOON WHEN CIRCUMSTANCES PERMIT: HIGH CHANCE OF DOZING
HOW LIKELY ARE YOU TO NOD OFF OR FALL ASLEEP WHILE SITTING INACTIVE IN A PUBLIC PLACE: WOULD NEVER DOZE
HOW LIKELY ARE YOU TO NOD OFF OR FALL ASLEEP WHILE SITTING QUIETLY AFTER LUNCH WITHOUT ALCOHOL: SLIGHT CHANCE OF DOZING
HOW LIKELY ARE YOU TO NOD OFF OR FALL ASLEEP WHEN YOU ARE A PASSENGER IN A CAR FOR AN HOUR WITHOUT A BREAK: MODERATE CHANCE OF DOZING
HOW LIKELY ARE YOU TO NOD OFF OR FALL ASLEEP WHILE SITTING AND TALKING TO SOMEONE: WOULD NEVER DOZE

## 2024-05-20 NOTE — PROGRESS NOTES
Outpatient Sleep Medicine Consultation:      Name: Katharina Rodríguez MRN# [id number]   Age: 58 year old YOB: 1965     Date of Consultation: May 20, 2024  Consultation is requested by: No referring provider defined for this encounter. No ref. provider found  Primary care provider: Becca Paula       Reason for Sleep Consult:     Katharina Rodríguez is sent by No ref. provider found for a sleep consultation regarding NANY.    Patient s Reason for visit  Katharina Rodríguez main reason for visit: Waking up 50% of nights a couple hours after falling asleep witn either or frequently both: congestion and restless achy legs.  Patient states problem(s) started: Congetstion started a year ago but has gotten consistently worse. Restless legs has been a problem for 15 years but until the last few months it has been manageable.  Katharina Rodríguez's goals for this visit: To get some help figuring out how to manage my congestion and restless legs.           Assessment and Plan:     Summary Sleep Diagnoses and Recommendations:  (G47.33) NANY (obstructive sleep apnea)  (primary encounter diagnosis)  Comment: Katharina has severe NANY diagnosed in 2019. CPAP has made a tremendous difference in her sleep. She uses CPAP nightly, but sometimes has to remove it due to nasal congestion. In the last year, she has been waking with tachycardia (3-4 times in the year). It only happens from sleep, and seems to occur when she has to remove the CPAP. Her download shows her apnea is well-treated.  Her weight is up about 10# since her study, but her download shows a low residual AHI and no significant leak.   Plan: Comprehensive DME        Continue auto CPAP 6 cm. Consider getting a full face mask to use when congestion. A prescription was written for new supplies. We reviewed recommendations for cleaning and replacing supplies.       (G25.81) Restless legs syndrome (RLS)  Comment: Has been having symptoms since 2007 or  2008. When she gets tired, she gets a creepy crawly sensation. If she does not go to sleep right away, she will not sleep well. This has been going on over the last 8 months, worsened when she was on a statin. She got off of that. She is on magnesium. It seemed to help for about a month and then it is not doing much lately  Plan: Ferritin        If her ferritin is low, we will start iron. If not, we will try gabapentin.    (R09.81) Nasal congestion  Comment: has year round congestion. Fluticasone is not very helpful. Oral antihistamines help a little.   Plan: Azelastine nasal spray was prescribed.       Comorbid Diagnoses:  HTN    Summary Counseling:    Sleep Testing Reviewed  Obstructive Sleep Apnea Reviewed  Complications of Untreated Sleep Apnea Reviewed      Patient will follow up in 1 year.  Bennett Goltz, PA-C      Total time spent reviewing medical records, history and physical examination, review of previous testing and interpretation as well as documentation on this date:46 min    CC: No ref. provider found          History of Present Illness:     [facility]. She was previously followed by my colleague, Dr. Torres, last visit in 2020. She  was set up at [facility] on December 16, 2019. Patient received a Resmed AirSense 10 Auto with pressures were set at 6 cm H2O.     She has been waking in the night with abnormal heart rate/rhythm, 3-4 times in the last year. It does not happen in the day. She did a heart monitor in March that did not  any events. It last happened a few weeks ago. She did a valsalva maneuver and that terminated the episode. It often happens in relation to being very congested and having to remove the CPAP. It is not a seasonal congestion. She just got a wedge pillow and it helped.   She takes Allegra daily except in the winter. She tried fluticasone but it did not help. She has sneezing and runny nose,  but no itchiness.  She uses the Eson 2 nasal mask. She occasionally wakes with a little leak, more when it is getting a little old. The CPAP makes a huge improvement in her sleep. She says if she does not wear it, she feels like she is wrestling lions all night. She does not think she snores with it on. Her mouth does sometimes pop open and she might puff or gasp, not often. She does not get a dry mouth and the humidifier does not run out.  She denies condensation in the hose or mask. She is comfortable with the pressure unless congested.   She replaces the filter monthly if she can remember.     The compliance data shows that the patient used the CPAP for 30/30 nights, 73% of nights for >4 hours.  The 95th% pressure is 6 cm.  The 95th% leak is 6.3 lpm.  The average nightly usage is 5:51.  The average AHI is 1/hr.     Past Sleep Evaluations: HST 11/19/19. AHI 41.9/hr. O2 juan 82%, 33 min below 89%. Wt: 165#    SLEEP-WAKE SCHEDULE:     Work/School Days: Patient goes to school/work: Yes   Usually gets into bed at 9:30pm  Takes patient about 5 minutes to fall asleep  Has trouble falling asleep I only occasionally have a hard time falling asleep. nights per week  Wakes up in the middle of the night Average 2 times per night times.  Wakes up due to Snorting self awake;Pain;External stimuli (bed partner, pets, noise, etc);Other, wears ear plugs because others in the house make noise.   She has trouble falling back asleep 3 nights per week times a week.   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 On other days, she wake at 6 AM.   Uses alarm: Yes    Weekends/Non-work Days/All Other Days:  Usually gets into bed at 9:30   Takes patient about 5 minutes to fall asleep  Patient is usually up at 6am  Uses alarm: Yes    Sleep Need  Patient gets  7-8 hours when i am able to sleep thru the night sleep on  average   Patient thinks she needs about 8 hours sleep    Katharina Rodríguez prefers to sleep in this position(s): Back;Side mostly back now due to hip pain.  Patient states they do the following activities in bed: Read    Naps  Patient takes a purposeful nap 7 times a week and naps are usually 25-40 minutes in duration  She feels better after a nap: Yes  She dozes off unintentionally Never days per week  Patient has had a driving accident or near-miss due to sleepiness/drowsiness: No      SLEEP DISRUPTIONS:    Breathing/Snoring  Patient snores:Yes without CPAP  Other people complain about her snoring: Yes  Patient has been told she stops breathing in her sleep:Yes, without the CPAP  She has issues with the following: Morning mouth dryness;Stuffy nose when you wake up. No morning headaches. No nocturnal heartburn or reflux. Yes nasal congestion at night.    Movement:  Patient gets pain, discomfort, with an urge to move:  Yes restless legs symptoms since 2007 or 2008. When she gets tired, she gets a creepy crawly sensation. If she does not go to sleep right away, she will not sleep well. This has been going on over the last 8 months, worsened when she was on a statin. She got off of that. She is on magnesium. It seemed to help for about a month and then it is not doing much lately.   It happens when she is resting:  Yes  It happens more at night:  Yes  Patient has been told she kicks her legs at night:  No     Behaviors in Sleep:  Katharina Rodríguez has experienced the following behaviors while sleeping: Teeth grinding- which was the reason for doing a sleep study. She got a mouth guard but thinks she clenches more when she wears it. No symptoms of bruxing at this time.     Pt denies sleep talking, sleep walking, and dream enactment behavior. Pt denies sleep paralysis, hypnagogue and cataplexy.       Is there anything else you would like your sleep provider to know:        CAFFEINE AND OTHER  SUBSTANCES:    Patient consumes caffeinated beverages per day:  One  Last caffeine use is usually: 7:30am  List of any prescribed or over the counter stimulants that patient takes:    List of any prescribed or over the counter sleep medication patient takes:    List of previous sleep medications that patient has tried:    Patient drinks alcohol to help them sleep: No  Patient drinks alcohol near bedtime: No    Family History:  Patient has a family member been diagnosed with a sleep disorder: No        Social History:  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      SCALES:    EPWORTH SLEEPINESS SCALE         5/20/2024     7:54 PM    Oldenburg Sleepiness Scale ( VJ Iqbal  [id number]<br>ESS - [place]/English - Final version - 21 Nov 07 - [facility] [place].)   Sitting and reading Slight chance of dozing   Watching TV Slight chance of dozing   Sitting, inactive in a public place (e.g. a theatre or a meeting) Would never doze   As a passenger in a car for an hour without a break Moderate chance of dozing   Lying down to rest in the afternoon when circumstances permit High chance of dozing   Sitting and talking to someone Would never doze   Sitting quietly after a lunch without alcohol Slight chance of dozing   In a car, while stopped for a few minutes in traffic Would never doze   Oldenburg Score (MC) 8   Oldenburg Score (Sleep) 8         INSOMNIA SEVERITY INDEX (NESHA)          5/20/2024     7:37 PM   Insomnia Severity Index (NESHA)   Difficulty falling asleep 0   Difficulty staying asleep 4   Problems waking up too early 0   How SATISFIED/DISSATISFIED are you with your CURRENT sleep pattern? 4   How NOTICEABLE to others do you think your sleep problem is in terms of impairing the quality of your life? 4   How WORRIED/DISTRESSED are you about your current sleep problem? 4   To what extent do you consider your sleep problem to INTERFERE with your daily functioning (e.g. daytime fatigue, mood,  "ability to function at work/daily chores, concentration, memory, mood, etc.) CURRENTLY? 4   NESHA Total Score 20       Guidelines for Scoring/Interpretation:  Total score categories:  0-7 = No clinically significant insomnia   8-14 = Subthreshold insomnia   15-21 = Clinical insomnia (moderate severity)  22-28 = Clinical insomnia (severe)  Used via courtesy of www.myhealth.va.gov with permission from Usman Garrido PhD., [facility]      STOP BANG         5/21/2024     3:03 PM   STOP BANG Questionnaire (  2008, the American Society of Anesthesiologists, Inc. [employer]   B/P Clinic: 138/77         GAD7         No data to display                  CAGE-AID         No data to display                CAGE-AID reprinted with permission from the [place] Medical Journal, JARETT Lorenz and APARNA Dawson, \"Conjoint screening questionnaires for alcohol and drug abuse\" [place] Medical Journal 94: 135-140, 1995.      PATIENT HEALTH QUESTIONNAIRE-9 (PHQ - 9)         No data to display                Developed by Petey Calle, Diamante Johnson, Fabricio Williamson and colleagues, with an educational esme from [employer] No permission required to reproduce, translate, display or distribute.        Allergies:    No Known Allergies    Medications:    Current Outpatient Medications   Medication Sig Dispense Refill    azelastine (ASTELIN) 0.1 % nasal spray Spray 1 spray into both nostrils 2 times daily 30 mL 2    carvedilol (COREG) 12.5 MG tablet TAKE 1 TABLET BY MOUTH TWICE A DAY WITH MEALS 180 tablet 0    lactobacillus rhamnosus, GG, (CULTURELL) capsule Take 1 capsule by mouth 2 times daily      lisinopril (ZESTRIL) 10 MG tablet TAKE 1 TABLET BY MOUTH DAILY TAKE DAILY 90 tablet 3    Multiple Vitamin (MULTIVITAMIN OR) Take 1 capsule by mouth daily.      Vitamin D, Cholecalciferol, 1000 units CAPS Take 1 capsule by mouth      rosuvastatin (CRESTOR) 20 MG tablet Take 1 tablet (20 mg) by mouth daily " (Patient not taking: Reported on 5/21/2024) 90 tablet 1       Problem List:  Patient Active Problem List    Diagnosis Date Noted    Pain in joint involving ankle and foot, right 03/25/2022     Priority: Medium    Posterior tibial tendinitis of right lower extremity 2019     Priority: Medium    Bunion, right 01/10/2018     Priority: Medium    Cervical high risk HPV (human papillomavirus) test positive 11/01/2015     Priority: Medium     11/2015: + HPV (not 16 or 18). Plan cotest pap & HPV in 1 year  11/09/16: NIL pap, + HR HPV (not 16 or 18) Plan [place].  02/07/17: [place] Bx's LSIL DEVYN 1. Plan: pap in 6/12 months or cotest in 1 year per provider, pt will cotest in 1 year.  01/10/18: NIL pap, Neg HR HPV result. Plan cotest in 3 years.   11/29/21 NIL, Neg HPV. Plan 3 yr co-test      Osteoarthritis of thumb 05/29/2014     Priority: Medium    Ganglion cyst 05/29/2014     Priority: Medium    Benign hypertension 07/22/2012     Priority: Medium     6/25/2012 started on Atenolol; well controlled; feeling well on meds          Past Medical/Surgical History:  Past Medical History:   Diagnosis Date    Acute midline low back pain without sciatica 09/2021    this has occurred one other time - would like PT in future if this occurs again    Cervical high risk HPV (human papillomavirus) test positive 11/2015,11/09/16    + HPV (not 16 or 18)    History of colposcopy with cervical biopsy 02/07/2017 02/07/17: [place] [facility] LSIL DEVYN 1.     Hypertension     NANY (obstructive sleep apnea) 2019    severe - uses CPAP    Posterior tibial tendinitis of right lower extremity 2019     Past Surgical History:   Procedure Laterality Date    COLONOSCOPY N/A 11/18/2015    RPT 10 years,  Kole Owen MD;  Location:  GI    NONE OF THE ABOVE CORE MEASURE DIAGNOSES         Social History:  Social History     Socioeconomic History    Marital status:      Spouse name: Not on file    Number of children: Not on file    Years of education: Not on  file    Highest education level: Not on file   Occupational History    Not on file   Tobacco Use    Smoking status: Never    Smokeless tobacco: Never   Vaping Use    Vaping status: Never Used   Substance and Sexual Activity    Alcohol use: Not Currently    Drug use: No    Sexual activity: Not Currently     Partners: Male   Other Topics Concern    Parent/sibling w/ CABG, MI or angioplasty before 65F 55M? No   Social History Narrative    Not on file     Social Determinants of Health     Financial Resource Strain: Low Risk  (12/12/2023)    Financial Resource Strain     Within the past 12 months, have you or your family members you live with been unable to get utilities (heat, electricity) when it was really needed?: No   Food Insecurity: Low Risk  (12/12/2023)    Food Insecurity     Within the past 12 months, did you worry that your food would run out before you got money to buy more?: No     Within the past 12 months, did the food you bought just not last and you didn t have money to get more?: No   Transportation Needs: Low Risk  (12/12/2023)    Transportation Needs     Within the past 12 months, has lack of transportation kept you from medical appointments, getting your medicines, non-medical meetings or appointments, work, or from getting things that you need?: No   Physical Activity: Insufficiently Active (2/1/2023)    Exercise Vital Sign     Days of Exercise per Week: 1 day     Minutes of Exercise per Session: 20 min   Stress: No Stress Concern Present (2/1/2023)    [place] [employer] - Occupational Stress Questionnaire     Feeling of Stress : Only a little   Social Connections: Socially Isolated (2/1/2023)    Social Connection and Isolation Panel [NHANES]     Frequency of Communication with Friends and Family: More than three times a week     Frequency of Social Gatherings with Friends and Family: More than three times a week     Attends [employer] Services: Never     Active Member of Clubs  "or Organizations: No     Attends Club or Organization Meetings: Not on file     Marital Status:    Interpersonal Safety: Low Risk  (12/13/2023)    Interpersonal Safety     Do you feel physically and emotionally safe where you currently live?: Yes     Within the past 12 months, have you been hit, slapped, kicked or otherwise physically hurt by someone?: No     Within the past 12 months, have you been humiliated or emotionally abused in other ways by your partner or ex-partner?: No   Housing Stability: Low Risk  (12/12/2023)    Housing Stability     Do you have housing? : Yes     Are you worried about losing your housing?: No       Family History:  Family History   Problem Relation Age of Onset    Hypertension Father     C.A.D. Father     Heart Disease Father     Psychotic Disorder Sister         bi polar    Movement disorder Sister         could be meds for mood       Review of Systems:  A complete review of systems reviewed by me is negative with the exeption of what has been mentioned in the history of present illness.        Physical Examination:  Vitals: /77   Pulse 86   Ht 1.702 m (5' 7.01\")   Wt 79.4 kg (175 lb)   LMP 05/01/2015 (Approximate)   SpO2 95%   BMI 27.40 kg/m    BMI= Body mass index is 27.4 kg/m .    Neck Cir (cm): 37 cm      GENERAL APPEARANCE: healthy, alert, no distress, and cooperative  EYES: Eyes grossly normal to inspection, PERRL, conjunctivae and sclerae normal, and lids and lashes normal  HENT: oropharynx crowded and soft palate dependent  NECK: no adenopathy, no asymmetry, masses, or scars, thyroid normal to palpation, and trachea midline and normal to palpation  RESP: lungs clear to auscultation - no rales, rhonchi or wheezes  CV: regular rates and rhythm, no murmur, click or rub, and no irregular beats  LYMPHATICS: no cervical adenopathy  MS: extremities normal- no gross deformities noted and pitting 1+ lower extremity edema bilaterally  NEURO: Normal strength and " "tone, mentation intact, and speech normal  Mallampati Class: III.  Tonsillar Stage: 1  hidden by pillars.         Data: All pertinent previous laboratory data reviewed     Recent Labs   Lab Test 02/01/23  1547 11/29/21  0948    139   POTASSIUM 4.4 4.3   CHLORIDE 102 106   CO2 26 27   ANIONGAP 11 6   GLC 86 112*   BUN 17.5 14   CR 0.90 0.83   GABBI 9.3 9.2       No results for input(s): \"WBC\", \"RBC\", \"HGB\", \"HCT\", \"MCV\", \"MCH\", \"MCHC\", \"RDW\", \"PLT\" in the last 71345 hours.    Recent Labs   Lab Test 02/01/23  1547   PROTTOTAL 7.1   ALBUMIN 4.4   BILITOTAL <0.2   ALKPHOS 94   AST 21   ALT 15       TSH   Date Value   02/01/2023 0.89 uIU/mL   07/18/2012 0.72 mU/L   12/20/2010 0.75 mU/L       No results found for: \"UAMP\", \"UBARB\", \"BENZODIAZEUR\", \"UCANN\", \"UCOC\", \"OPIT\", \"UPCP\"    No results found for: \"IRONSAT\", \"FR86949\", \"JEREMY\"    No results found for: \"PH\", \"PHARTERIAL\", \"PO2\", \"ZR0QASRVXIE\", \"SAT\", \"PCO2\", \"HCO3\", \"BASEEXCESS\", \"BETTY\", \"BEB\"    @LABRCNTIPR(phv:4,pco2v:4,po2v:4,hco3v:4,mulu:4,o2per:4)@    Echocardiology: No results found for this or any previous visit (from the past 4320 hour(s)).    Chest x-ray: No results found for this or any previous visit from the past 365 days.      Chest CT: No results found for this or any previous visit from the past 365 days.      PFT: Most Recent [place] Pulmonary Function Testing    No results found for: \"20001\"        Bennett Ezra Goltz, PA-C, ANNA 5/20/2024          "

## 2024-05-21 ENCOUNTER — OFFICE VISIT (OUTPATIENT)
Dept: SLEEP MEDICINE | Facility: CLINIC | Age: 59
End: 2024-05-21
Payer: COMMERCIAL

## 2024-05-21 VITALS
BODY MASS INDEX: 27.47 KG/M2 | OXYGEN SATURATION: 95 % | DIASTOLIC BLOOD PRESSURE: 77 MMHG | HEART RATE: 86 BPM | HEIGHT: 67 IN | SYSTOLIC BLOOD PRESSURE: 138 MMHG | WEIGHT: 175 LBS

## 2024-05-21 DIAGNOSIS — G25.81 RESTLESS LEGS SYNDROME (RLS): ICD-10-CM

## 2024-05-21 DIAGNOSIS — R09.81 NASAL CONGESTION: ICD-10-CM

## 2024-05-21 DIAGNOSIS — G47.33 OSA (OBSTRUCTIVE SLEEP APNEA): Primary | ICD-10-CM

## 2024-05-21 PROCEDURE — 99204 OFFICE O/P NEW MOD 45 MIN: CPT | Performed by: PHYSICIAN ASSISTANT

## 2024-05-21 RX ORDER — AZELASTINE 1 MG/ML
1 SPRAY, METERED NASAL 2 TIMES DAILY
Qty: 30 ML | Refills: 2 | Status: SHIPPED | OUTPATIENT
Start: 2024-05-21 | End: 2024-06-18

## 2024-05-21 NOTE — NURSING NOTE
"Chief Complaint   Patient presents with    Sleep Problem     Re establish care, using CPAP       Initial BP (!) 144/83   Pulse 86   Ht 1.702 m (5' 7.01\")   Wt 79.4 kg (175 lb)   LMP 05/01/2015 (Approximate)   SpO2 95%   BMI 27.40 kg/m   Estimated body mass index is 27.4 kg/m  as calculated from the following:    Height as of this encounter: 1.702 m (5' 7.01\").    Weight as of this encounter: 79.4 kg (175 lb).    Medication Reconciliation: complete  ESS 8  Neck circumference: 37 centimeters.  Brian Johnson MA     "

## 2024-05-23 ENCOUNTER — MYC MEDICAL ADVICE (OUTPATIENT)
Dept: FAMILY MEDICINE | Facility: CLINIC | Age: 59
End: 2024-05-23
Payer: COMMERCIAL

## 2024-05-23 NOTE — TELEPHONE ENCOUNTER
Dr. Rivera   Please see my chart message     Thank you   Anna Lemus, Registered Nurse  [facility]

## 2024-05-25 ENCOUNTER — LAB (OUTPATIENT)
Dept: LAB | Facility: CLINIC | Age: 59
End: 2024-05-25
Payer: COMMERCIAL

## 2024-05-25 DIAGNOSIS — G25.81 RESTLESS LEGS SYNDROME (RLS): ICD-10-CM

## 2024-05-25 LAB — FERRITIN SERPL-MCNC: 156 NG/ML (ref 11–328)

## 2024-05-25 PROCEDURE — 36415 COLL VENOUS BLD VENIPUNCTURE: CPT

## 2024-05-25 PROCEDURE — 82728 ASSAY OF FERRITIN: CPT

## 2024-05-28 NOTE — RESULT ENCOUNTER NOTE
Dear Katharina,  Your ferritin result was normal at 156. As a reminder, this lab was drawn to see if low iron was contributing to restless legs syndrome. Ferritin levels below 50 can contribute to restlessness. We can discuss further treatment options at your follow up.  At your last visit, we talked about trying gabapentin to help with the restless legs if the ferritin is normal. If you are interested, I can send in a prescription to your pharmacy.    Bennett Goltz, PA-C

## 2024-06-02 ENCOUNTER — MYC MEDICAL ADVICE (OUTPATIENT)
Dept: SLEEP MEDICINE | Facility: CLINIC | Age: 59
End: 2024-06-02
Payer: COMMERCIAL

## 2024-06-02 DIAGNOSIS — G25.81 RESTLESS LEGS SYNDROME (RLS): Primary | ICD-10-CM

## 2024-06-04 RX ORDER — GABAPENTIN 100 MG/1
CAPSULE ORAL
Qty: 90 CAPSULE | Refills: 0 | Status: SHIPPED | OUTPATIENT
Start: 2024-06-04 | End: 2024-08-20

## 2024-06-04 NOTE — TELEPHONE ENCOUNTER
Gabapentin 100 mg capsules prescribed, #90 caps no refills.  Instructions for taking gabapentin:  Start with 100 mg at bedtime. May increase by 100 mg every 5 days as needed. If 600 mg is reached without benefit, wean off the medication. Call once 300 mg is reached and I can prescribe a 300 mg capsule. We discussed possible side effects including weight gain, swelling and depression. Discontinue the medication if side effects occur.     Bennett Goltz, PA-C

## 2024-06-16 ENCOUNTER — MYC MEDICAL ADVICE (OUTPATIENT)
Dept: FAMILY MEDICINE | Facility: CLINIC | Age: 59
End: 2024-06-16
Payer: COMMERCIAL

## 2024-06-17 NOTE — TELEPHONE ENCOUNTER
Dr. Paula- statin DC'd from med list in previous message.  Patient sending another message.  Any comment?  ARTEMIO Herbert Theresa, ARTEMIO     TR    5/23/24  7:18 AM  Note  Dr. Rivera   Please see my chart message      Thank you   Anna Lemus Registered Nurse  [facility]            Anna Lemus, RN   to Katharina Rodríguez   TR      5/23/24  7:18 AM  J Luis Posadas,      Your message has been sent to Dr. Rivera to review - she is out of the office until 5/28/2024, if your symptoms do not improve or worsen please call triage nurse [phone] to get an assessment and come up with a plan      Take care,   Anna Lemus Registered Nurse  [facility]     Last read by Katharina Rodríguez at  8:33 PM on 6/16/2024.  Katharina Rodríguez   to [facility] (supporting Becca Paula MD)         5/23/24  7:02 AM  Herbert Hudson,     I have stopped taking my statin again. You requested that I half the dose in hope that I would tolerate it better.     I was feeling weak, exhausted and had more pain than I normally have in my hips and legs.     Thank you,  Katharina Rodríguez   [phone]

## 2024-06-24 SDOH — HEALTH STABILITY: PHYSICAL HEALTH: ON AVERAGE, HOW MANY DAYS PER WEEK DO YOU ENGAGE IN MODERATE TO STRENUOUS EXERCISE (LIKE A BRISK WALK)?: 5 DAYS

## 2024-06-24 SDOH — HEALTH STABILITY: PHYSICAL HEALTH: ON AVERAGE, HOW MANY MINUTES DO YOU ENGAGE IN EXERCISE AT THIS LEVEL?: 30 MIN

## 2024-06-24 ASSESSMENT — SOCIAL DETERMINANTS OF HEALTH (SDOH): HOW OFTEN DO YOU GET TOGETHER WITH FRIENDS OR RELATIVES?: MORE THAN THREE TIMES A WEEK

## 2024-06-25 ENCOUNTER — OFFICE VISIT (OUTPATIENT)
Dept: FAMILY MEDICINE | Facility: CLINIC | Age: 59
End: 2024-06-25
Payer: COMMERCIAL

## 2024-06-25 VITALS
RESPIRATION RATE: 12 BRPM | TEMPERATURE: 98.1 F | SYSTOLIC BLOOD PRESSURE: 120 MMHG | HEART RATE: 70 BPM | WEIGHT: 176 LBS | DIASTOLIC BLOOD PRESSURE: 71 MMHG | HEIGHT: 67 IN | BODY MASS INDEX: 27.62 KG/M2 | OXYGEN SATURATION: 98 %

## 2024-06-25 DIAGNOSIS — I10 BENIGN HYPERTENSION: ICD-10-CM

## 2024-06-25 DIAGNOSIS — R87.810 CERVICAL HIGH RISK HPV (HUMAN PAPILLOMAVIRUS) TEST POSITIVE: ICD-10-CM

## 2024-06-25 DIAGNOSIS — E78.5 HYPERLIPIDEMIA LDL GOAL <100: ICD-10-CM

## 2024-06-25 DIAGNOSIS — Z82.49 FAMILY HISTORY OF CORONARY ARTERY DISEASE: ICD-10-CM

## 2024-06-25 DIAGNOSIS — I47.10 SVT (SUPRAVENTRICULAR TACHYCARDIA) (H): ICD-10-CM

## 2024-06-25 DIAGNOSIS — Z00.00 ROUTINE GENERAL MEDICAL EXAMINATION AT A HEALTH CARE FACILITY: Primary | ICD-10-CM

## 2024-06-25 DIAGNOSIS — R73.03 PREDIABETES: ICD-10-CM

## 2024-06-25 DIAGNOSIS — G25.81 RESTLESS LEGS SYNDROME (RLS): ICD-10-CM

## 2024-06-25 LAB
ALBUMIN SERPL BCG-MCNC: 4.2 G/DL (ref 3.5–5.2)
ALP SERPL-CCNC: 77 U/L (ref 40–150)
ALT SERPL W P-5'-P-CCNC: 23 U/L (ref 0–50)
ANION GAP SERPL CALCULATED.3IONS-SCNC: 7 MMOL/L (ref 7–15)
AST SERPL W P-5'-P-CCNC: 26 U/L (ref 0–45)
BILIRUB SERPL-MCNC: 0.3 MG/DL
BUN SERPL-MCNC: 14.1 MG/DL (ref 8–23)
CALCIUM SERPL-MCNC: 9.3 MG/DL (ref 8.8–10.2)
CHLORIDE SERPL-SCNC: 105 MMOL/L (ref 98–107)
CHOLEST SERPL-MCNC: 263 MG/DL
CREAT SERPL-MCNC: 0.99 MG/DL (ref 0.51–0.95)
CREAT UR-MCNC: 118 MG/DL
DEPRECATED HCO3 PLAS-SCNC: 26 MMOL/L (ref 22–29)
EGFRCR SERPLBLD CKD-EPI 2021: 65 ML/MIN/1.73M2
ERYTHROCYTE [DISTWIDTH] IN BLOOD BY AUTOMATED COUNT: 12.7 % (ref 10–15)
FASTING STATUS PATIENT QL REPORTED: YES
FASTING STATUS PATIENT QL REPORTED: YES
GLUCOSE SERPL-MCNC: 101 MG/DL (ref 70–99)
HBA1C MFR BLD: 5.9 % (ref 0–5.6)
HCT VFR BLD AUTO: 40 % (ref 35–47)
HDLC SERPL-MCNC: 51 MG/DL
HGB BLD-MCNC: 13.1 G/DL (ref 11.7–15.7)
LDLC SERPL CALC-MCNC: 182 MG/DL
MCH RBC QN AUTO: 29.1 PG (ref 26.5–33)
MCHC RBC AUTO-ENTMCNC: 32.8 G/DL (ref 31.5–36.5)
MCV RBC AUTO: 89 FL (ref 78–100)
MICROALBUMIN UR-MCNC: <12 MG/L
MICROALBUMIN/CREAT UR: NORMAL MG/G{CREAT}
NONHDLC SERPL-MCNC: 212 MG/DL
PLATELET # BLD AUTO: 265 10E3/UL (ref 150–450)
POTASSIUM SERPL-SCNC: 4.8 MMOL/L (ref 3.4–5.3)
PROT SERPL-MCNC: 7 G/DL (ref 6.4–8.3)
RBC # BLD AUTO: 4.5 10E6/UL (ref 3.8–5.2)
SODIUM SERPL-SCNC: 138 MMOL/L (ref 135–145)
TRIGL SERPL-MCNC: 152 MG/DL
WBC # BLD AUTO: 5.6 10E3/UL (ref 4–11)

## 2024-06-25 PROCEDURE — 99214 OFFICE O/P EST MOD 30 MIN: CPT | Mod: 25 | Performed by: FAMILY MEDICINE

## 2024-06-25 PROCEDURE — 87624 HPV HI-RISK TYP POOLED RSLT: CPT | Performed by: FAMILY MEDICINE

## 2024-06-25 PROCEDURE — 85027 COMPLETE CBC AUTOMATED: CPT | Performed by: FAMILY MEDICINE

## 2024-06-25 PROCEDURE — 83036 HEMOGLOBIN GLYCOSYLATED A1C: CPT | Performed by: FAMILY MEDICINE

## 2024-06-25 PROCEDURE — 82570 ASSAY OF URINE CREATININE: CPT | Performed by: FAMILY MEDICINE

## 2024-06-25 PROCEDURE — 82043 UR ALBUMIN QUANTITATIVE: CPT | Performed by: FAMILY MEDICINE

## 2024-06-25 PROCEDURE — 80061 LIPID PANEL: CPT | Performed by: FAMILY MEDICINE

## 2024-06-25 PROCEDURE — 80053 COMPREHEN METABOLIC PANEL: CPT | Performed by: FAMILY MEDICINE

## 2024-06-25 PROCEDURE — 36415 COLL VENOUS BLD VENIPUNCTURE: CPT | Performed by: FAMILY MEDICINE

## 2024-06-25 PROCEDURE — G0145 SCR C/V CYTO,THINLAYER,RESCR: HCPCS | Performed by: FAMILY MEDICINE

## 2024-06-25 PROCEDURE — 99396 PREV VISIT EST AGE 40-64: CPT | Performed by: FAMILY MEDICINE

## 2024-06-25 RX ORDER — CARVEDILOL 12.5 MG/1
12.5 TABLET ORAL 2 TIMES DAILY WITH MEALS
Qty: 180 TABLET | Refills: 4 | Status: SHIPPED | OUTPATIENT
Start: 2024-06-25

## 2024-06-25 ASSESSMENT — PAIN SCALES - GENERAL: PAINLEVEL: SEVERE PAIN (7)

## 2024-06-25 NOTE — PATIENT INSTRUCTIONS
"Patient Education   Preventive Care Advice   This is general advice we often give to help people stay healthy. Your care team may have specific advice just for you. Please talk to your care team about your own preventive care needs.  Lifestyle  Exercise at least 150 minutes each week (30 minutes a day, 5 days a week).  Do muscle strengthening activities 2 days a week. These help control your weight and prevent disease.  No smoking.  Wear sunscreen to prevent skin cancer.  Have your home tested for radon every 2 to 5 years. Radon is a colorless, odorless gas that can harm your lungs. To learn more, go to www.health.[facility].mn.us and search for \"Radon in Homes.\"  Keep guns unloaded and locked up in a safe place like a safe or gun vault, or, use a gun lock and hide the keys. Always lock away bullets separately. To learn more, visit [place].[place].gov and search for \"safe gun storage.\"  Nutrition  Eat 5 or more servings of fruits and vegetables each day.  Try wheat bread, brown rice and whole grain pasta (instead of white bread, rice, and pasta).  Get enough calcium and vitamin D. Check the label on foods and aim for 100% of the RDA (recommended daily allowance).  Regular exams  Have a dental exam and cleaning every 6 months.  See your health care team every year to talk about:  Any changes in your health.  Any medicines your care team has prescribed.  Preventive care, family planning, and ways to prevent chronic diseases.  Shots (vaccines)   HPV shots (up to age 26), if you've never had them before.  Hepatitis B shots (up to age 59), if you've never had them before.  COVID-19 shot: Get this shot when it's due.  Flu shot: Get a flu shot every year.  Tetanus shot: Get a tetanus shot every 10 years.  Pneumococcal, hepatitis A, and RSV shots: Ask your care team if you need these based on your risk.  Shingles shot (for age 50 and up).  General health tests  Diabetes screening:  Starting at age 35, Get screened for diabetes at least " every 3 years.  If you are younger than age 35, ask your care team if you should be screened for diabetes.  Cholesterol test: At age 39, start having a cholesterol test every 5 years, or more often if advised.  Bone density scan (DEXA): At age 50, ask your care team if you should have this scan for osteoporosis (brittle bones).  Hepatitis C: Get tested at least once in your life.  Abdominal aortic aneurysm screening: Talk to your doctor about having this screening if you:  Have ever smoked; and  Are biologically male; and  Are between the ages of 65 and 75.  STIs (sexually transmitted infections)  Before age 24: Ask your care team if you should be screened for STIs.  After age 24: Get screened for STIs if you're at risk. You are at risk for STIs (including HIV) if:  You are sexually active with more than one person.  You don't use condoms every time.  You or a partner was diagnosed with a sexually transmitted infection.  If you are at risk for HIV, ask about PrEP medicine to prevent HIV.  Get tested for HIV at least once in your life, whether you are at risk for HIV or not.  Cancer screening tests  Cervical cancer screening: If you have a cervix, begin getting regular cervical cancer screening tests at age 21. Most people who have regular screenings with normal results can stop after age 65. Talk about this with your provider.  Breast cancer scan (mammogram): If you've ever had breasts, begin having regular mammograms starting at age 40. This is a scan to check for breast cancer.  Colon cancer screening: It is important to start screening for colon cancer at age 45.  Have a colonoscopy test every 10 years (or more often if you're at risk) Or, ask your provider about stool tests like a FIT test every year or Cologuard test every 3 years.  To learn more about your testing options, visit: www.[employer]/737367.pdf.  For help making a decision, visit: sheldon/fd27792.  Prostate cancer screening test: If you have a  prostate and are age 55 to 69, ask your provider if you would benefit from a yearly prostate cancer screening test.  Lung cancer screening: If you are a current or former smoker age 50 to 80, ask your care team if ongoing lung cancer screenings are right for you.  For informational purposes only. Not to replace the advice of your health care provider. Copyright   2023 [facility]. All rights reserved. Clinically reviewed by the  [employer] [facility] [facility]. [employer] 647488 - REV 04/24.  Substance Use Disorder: Care Instructions  Overview     You can improve your life and health by stopping your use of alcohol or drugs. When you don't drink or use drugs, you may feel and sleep better. You may get along better with your family, friends, and coworkers. There are medicines and programs that can help with substance use disorder.  How can you care for yourself at home?  Here are some ways to help you stay sober and prevent relapse.  If you have been given medicine to help keep you sober or reduce your cravings, be sure to take it exactly as prescribed.  Talk to your doctor about programs that can help you stop using drugs or drinking alcohol.  Do not keep alcohol or drugs in your home.  Plan ahead. Think about what you'll say if other people ask you to drink or use drugs. Try not to spend time with people who drink or use drugs.  Use the time and money spent on drinking or drugs to do something that's important to you.  Preventing a relapse  Have a plan to deal with relapse. Learn to recognize changes in your thinking that lead you to drink or use drugs. Get help before you start to drink or use drugs again.  Try to stay away from situations, friends, or places that may lead you to drink or use drugs.  If you feel the need to drink alcohol or use drugs again, seek help right away. Call a trusted friend or family member. Some people get support from organizations such as Narcotics Anonymous or [employer] or from treatment facilities.  If you relapse, get help as soon as you can. Some people make a plan with another person that outlines what they want that person to do for them if they relapse. The plan usually includes how to handle the relapse and who to notify in case of relapse.  Don't give up. Remember that a relapse doesn't mean that you have failed. Use the experience to learn the triggers that lead you to drink or use drugs. Then quit again. Recovery is a lifelong process. Many people have several relapses before they are able to quit for good.  Follow-up care is a key part of your treatment and safety. Be sure to make and go to all appointments, and call your doctor if you are having problems. It's also a good idea to know your test results and keep a list of the medicines you take.  When should you call for help?   Call 911  anytime you think you may need emergency care. For example, call if you or someone else:    Has overdosed or has withdrawal signs. Be sure to tell the emergency workers that you are or someone else is using or trying to quit using drugs. Overdose or withdrawal signs may include:  Losing consciousness.  Seizure.  Seeing or hearing things that aren't there (hallucinations).     Is thinking or talking about suicide or harming others.   Where to get help 24 hours a day, 7 days a week   If you or someone you know talks about suicide, self-harm, a mental health crisis, a substance use crisis, or any other kind of emotional distress, get help right away. You can:    Call the Suicide and Crisis Lifeline at 988.     Call 1-796-839-TALK (1-994.435.8813).     Text HOME to 658518 to access the Crisis Text Line.   Consider saving these numbers in your phone.  Go to [employer] for more information or to chat online.  Call your doctor now or seek immediate medical care if:    You are having withdrawal symptoms. These may include nausea or vomiting, sweating, shakiness, and anxiety.  "  Watch closely for changes in your health, and be sure to contact your doctor if:    You have a relapse.     You need more help or support to stop.   Where can you learn more?  Go to https://www.[employer].net/patiented  Enter H573 in the search box to learn more about \"Substance Use Disorder: Care Instructions.\"  Current as of: November 15, 2023               Content Version: 14.0    1641-7668 [employer].   Care instructions adapted under license by your healthcare professional. If you have questions about a medical condition or this instruction, always ask your healthcare professional. [employer] disclaims any warranty or liability for your use of this information.         "

## 2024-06-25 NOTE — PROGRESS NOTES
"Preventive Care Visit  [facility]  Becca Paula MD, Family Medicine  Jun 25, 2024      Assessment & Plan     Routine general medical examination at a health care facility    - REVIEW OF HEALTH MAINTENANCE PROTOCOL ORDERS  - Comprehensive metabolic panel  - CBC with platelets  - Lipid panel reflex to direct LDL Fasting  - Adult Cardiology Eval  Referral    SVT (supraventricular tachycardia) (H24)  Stable  Refills per epicare    - Comprehensive metabolic panel  - carvedilol (COREG) 12.5 MG tablet  Dispense: 180 tablet; Refill: 4    Benign hypertension  Under great control  Continue   - Comprehensive metabolic panel  - Albumin Random Urine Quantitative with Creat Ratio    Cervical high risk HPV (human papillomavirus) test positive  Recheck - will do pap every 3 years until 70  - Gynecologic Cytology (Pap) and HPV - Recommended Age 30-65 Years    Hyperlipidemia LDL goal <100  Could not tolerate statin  Will send to cards prevention   - Lipid panel reflex to direct LDL Fasting    Family history of coronary artery disease    - Lipid panel reflex to direct LDL Fasting  - carvedilol (COREG) 12.5 MG tablet  Dispense: 180 tablet; Refill: 4  - Adult Cardiology Eval  Referral    Prediabetes    - Hemoglobin A1c    Restless legs syndrome (RLS)  Stable       Patient has been advised of split billing requirements and indicates understanding: Yes        BMI  Estimated body mass index is 27.57 kg/m  as calculated from the following:    Height as of this encounter: 1.702 m (5' 7\").    Weight as of this encounter: 79.8 kg (176 lb).   Weight management plan: Discussed healthy diet and exercise guidelines    Counseling  Appropriate preventive services were discussed with this patient, including applicable screening as appropriate for fall prevention, nutrition, physical activity, Tobacco-use cessation, weight loss and cognition.  Checklist reviewing preventive services available has been " given to the patient.  Reviewed patient's diet, addressing concerns and/or questions.       FUTURE APPOINTMENTS:       - Follow-up visit in 6 month BP check   See Patient Instructions    Richard Posadas is a 58 year old, presenting for the following:  Physical (Preventative / chronic pain back, ankles, legs) and A.D.H.D (ADHD for woman - very active)  She has issues with joint pain which were way worse on statin that she went on due to family hs of heart disease and high cholesterol and elevated CAC score.   She is off statin now due to pain.       She is also here for recheck of BP.           6/25/2024     8:06 AM   Additional Questions   Roomed by carmel gallegos        Health Care Directive  Patient does not have a Health Care Directive or Living Will: Discussed advance care planning with patient; information given to patient to review.    HPI              6/24/2024   General Health   How would you rate your overall physical health? Good   Feel stress (tense, anxious, or unable to sleep) Not at all            6/24/2024   Nutrition   Three or more servings of calcium each day? Yes   Diet: Low salt   How many servings of fruit and vegetables per day? 4 or more   How many sweetened beverages each day? 0-1            6/24/2024   Exercise   Days per week of moderate/strenous exercise 5 days   Average minutes spent exercising at this level 30 min            6/24/2024   Social Factors   Frequency of gathering with friends or relatives More than three times a week   Worry food won't last until get money to buy more No   Food not last or not have enough money for food? No   Do you have housing? (Housing is defined as stable permanent housing and does not include staying ouside in a car, in a tent, in an abandoned building, in an overnight shelter, or couch-surfing.) Yes   Are you worried about losing your housing? No   Lack of transportation? No   Unable to get utilities (heat,electricity)? No             6/24/2024   Fall Risk   Fallen 2 or more times in the past year? No   Trouble with walking or balance? No             6/24/2024   Dental   Dentist two times every year? Yes            6/24/2024   TB Screening   Were you born outside of the [place]? No              Today's PHQ-2 Score:       5/21/2024     2:37 PM   PHQ-2 ( 1999 Pfizer)   Q1: Little interest or pleasure in doing things 1   Q2: Feeling down, depressed or hopeless 0   PHQ-2 Score 1         6/24/2024   Substance Use   Alcohol more than 3/day or more than 7/wk No   Do you use any other substances recreationally? (!) CANNABIS PRODUCTS        Social History     Tobacco Use    Smoking status: Never    Smokeless tobacco: Never   Vaping Use    Vaping status: Never Used   Substance Use Topics    Alcohol use: Not Currently    Drug use: No           2/3/2022   LAST FHS-7 RESULTS   1st degree relative breast or ovarian cancer No   Any relative bilateral breast cancer No   Any male have breast cancer No   Any ONE woman have BOTH breast AND ovarian cancer No   Any woman with breast cancer before 50yrs No   2 or more relatives with breast AND/OR ovarian cancer No   2 or more relatives with breast AND/OR bowel cancer No           Mammogram Screening - Mammogram every 1-2 years updated in Health Maintenance based on mutual decision making        6/24/2024   STI Screening   New sexual partner(s) since last STI/HIV test? No        History of abnormal Pap smear: yes - every 3 years         Latest Ref Rng & Units 11/29/2021     9:23 AM 1/10/2018     3:52 PM 1/10/2018     1:31 PM   PAP / HPV   PAP  Negative for Intraepithelial Lesion or Malignancy (NILM)      PAP (Historical)    NIL    HPV 16 DNA Negative Negative  Negative     HPV 18 DNA Negative Negative  Negative     Other HR HPV Negative Negative  Negative       ASCVD Risk   The 10-year ASCVD risk score (Michael MERCEDES, et al., 2019) is: 4.9%    Values used to calculate the score:      Age: 58 years      Sex: Female       Is Non- : No      Diabetic: No      Tobacco smoker: No      Systolic Blood Pressure: 120 mmHg      Is BP treated: Yes      HDL Cholesterol: 50 mg/dL      Total Cholesterol: 248 mg/dL           Reviewed and updated as needed this visit by Provider                    Labs reviewed in EPIC  BP Readings from Last 3 Encounters:   06/25/24 120/71   05/21/24 138/77   12/13/23 125/74    Wt Readings from Last 3 Encounters:   06/25/24 79.8 kg (176 lb)   05/21/24 79.4 kg (175 lb)   12/13/23 78 kg (172 lb)                  Patient Active Problem List   Diagnosis    Benign hypertension    Osteoarthritis of thumb    Ganglion cyst    Cervical high risk HPV (human papillomavirus) test positive    Bunion, right    Posterior tibial tendinitis of right lower extremity    Pain in joint involving ankle and foot, right    SVT (supraventricular tachycardia) (H24)     Past Surgical History:   Procedure Laterality Date    COLONOSCOPY N/A 11/18/2015    RPT 10 years,  Kole Owen MD;  Location:  GI    NONE OF THE ABOVE CORE MEASURE DIAGNOSES         Social History     Tobacco Use    Smoking status: Never    Smokeless tobacco: Never   Substance Use Topics    Alcohol use: Not Currently     Family History   Problem Relation Age of Onset    Hypertension Father     C.A.D. Father     Heart Disease Father     Psychotic Disorder Sister         bi polar    Movement disorder Sister         could be meds for mood         Current Outpatient Medications   Medication Sig Dispense Refill    carvedilol (COREG) 12.5 MG tablet TAKE 1 TABLET BY MOUTH TWICE A DAY WITH MEALS 180 tablet 0    gabapentin (NEURONTIN) 100 MG capsule Take 1 capsule by mouth in the evening. May increase by 1 capsule every 5 days if needed. Max 600 mg 90 capsule 0    lactobacillus rhamnosus, GG, (CULTURELL) capsule Take 1 capsule by mouth 2 times daily      lisinopril (ZESTRIL) 10 MG tablet TAKE 1 TABLET BY MOUTH DAILY TAKE DAILY 90 tablet 3    Multiple  "Vitamin (MULTIVITAMIN OR) Take 1 capsule by mouth daily.      Vitamin D, Cholecalciferol, 1000 units CAPS Take 1 capsule by mouth           Review of Systems  Constitutional, HEENT, cardiovascular, pulmonary, gi and gu systems are negative, except as otherwise noted.     Objective    Exam  /71 (BP Location: Right arm, Patient Position: Sitting, Cuff Size: Adult Regular)   Pulse 70   Temp 98.1  F (36.7  C) (Oral)   Resp 12   Ht 1.702 m (5' 7\")   Wt 79.8 kg (176 lb)   LMP 05/01/2015 (Approximate)   SpO2 98%   BMI 27.57 kg/m     Estimated body mass index is 27.57 kg/m  as calculated from the following:    Height as of this encounter: 1.702 m (5' 7\").    Weight as of this encounter: 79.8 kg (176 lb).    Physical Exam  GENERAL: alert and no distress  EYES: Eyes grossly normal to inspection, PERRL and conjunctivae and sclerae normal  HENT: ear canals and TM's normal, nose and mouth without ulcers or lesions  NECK: no adenopathy, no asymmetry, masses, or scars  RESP: lungs clear to auscultation - no rales, rhonchi or wheezes  BREAST: normal without masses, tenderness or nipple discharge and no palpable axillary masses or adenopathy  CV: regular rate and rhythm, normal S1 S2, no S3 or S4, no murmur, click or rub, no peripheral edema  ABDOMEN: soft, nontender, no hepatosplenomegaly, no masses and bowel sounds normal   (female) w/bimanual: normal female external genitalia, normal urethral meatus, normal vaginal mucosa, and normal cervix/adnexa/uterus without masses or discharge  MS: no gross musculoskeletal defects noted, no edema  SKIN: no suspicious lesions or rashes  NEURO: Normal strength and tone, mentation intact and speech normal  PSYCH: mentation appears normal, affect normal/bright  LYMPH: no cervical, supraclavicular, axillary, or inguinal adenopathy        Signed Electronically by: Becca Paula MD    "

## 2024-06-26 LAB
HPV HR 12 DNA CVX QL NAA+PROBE: NEGATIVE
HPV16 DNA CVX QL NAA+PROBE: NEGATIVE
HPV18 DNA CVX QL NAA+PROBE: NEGATIVE
HUMAN PAPILLOMA VIRUS FINAL DIAGNOSIS: NORMAL

## 2024-06-30 LAB
BKR LAB AP GYN ADEQUACY: NORMAL
BKR LAB AP GYN INTERPRETATION: NORMAL
BKR LAB AP PREVIOUS ABNORMAL: NORMAL
PATH REPORT.COMMENTS IMP SPEC: NORMAL
PATH REPORT.COMMENTS IMP SPEC: NORMAL
PATH REPORT.RELEVANT HX SPEC: NORMAL

## 2024-07-01 ENCOUNTER — MYC REFILL (OUTPATIENT)
Dept: SLEEP MEDICINE | Facility: CLINIC | Age: 59
End: 2024-07-01
Payer: COMMERCIAL

## 2024-07-01 DIAGNOSIS — G25.81 RESTLESS LEGS SYNDROME (RLS): ICD-10-CM

## 2024-07-01 RX ORDER — GABAPENTIN 100 MG/1
400 CAPSULE ORAL DAILY
Qty: 120 CAPSULE | Refills: 0 | Status: CANCELLED | OUTPATIENT
Start: 2024-07-01

## 2024-07-01 NOTE — TELEPHONE ENCOUNTER
Patient settled at 400mg and things its where she'll stay.  Script updated and pended.      Pending Prescriptions:                       Disp   Refills    gabapentin (NEURONTIN) 100 MG capsule     90 cap*0            Sig: Take 4 capsules (400 mg) by mouth daily In the           evening          Last Written Prescription Date:  6/4/24  Last Fill Quantity: 90   # refills: 0  Last Office Visit: 5/21/24  Future Office visit:  NA     Routing refill request to provider for review/approval because:  Drug not on the [facility], P or [facility] refill protocol or controlled substance

## 2024-07-02 RX ORDER — GABAPENTIN 400 MG/1
CAPSULE ORAL
Qty: 30 CAPSULE | Refills: 2 | Status: SHIPPED | OUTPATIENT
Start: 2024-07-02 | End: 2024-10-04

## 2024-08-02 ENCOUNTER — MYC REFILL (OUTPATIENT)
Dept: SLEEP MEDICINE | Facility: CLINIC | Age: 59
End: 2024-08-02
Payer: COMMERCIAL

## 2024-08-02 DIAGNOSIS — G25.81 RESTLESS LEGS SYNDROME (RLS): ICD-10-CM

## 2024-08-02 RX ORDER — GABAPENTIN 400 MG/1
CAPSULE ORAL
Qty: 30 CAPSULE | Refills: 2 | Status: CANCELLED | OUTPATIENT
Start: 2024-08-02

## 2024-08-06 ENCOUNTER — MYC MEDICAL ADVICE (OUTPATIENT)
Dept: FAMILY MEDICINE | Facility: CLINIC | Age: 59
End: 2024-08-06
Payer: COMMERCIAL

## 2024-08-06 NOTE — TELEPHONE ENCOUNTER
See pt's [employer] message.     Replied via [employer].     Akila MORALES, RN   Clinic RN  [facility]

## 2024-08-20 ENCOUNTER — OFFICE VISIT (OUTPATIENT)
Dept: CARDIOLOGY | Facility: CLINIC | Age: 59
End: 2024-08-20
Attending: FAMILY MEDICINE
Payer: COMMERCIAL

## 2024-08-20 VITALS
WEIGHT: 175 LBS | DIASTOLIC BLOOD PRESSURE: 74 MMHG | HEIGHT: 68 IN | HEART RATE: 88 BPM | RESPIRATION RATE: 14 BRPM | SYSTOLIC BLOOD PRESSURE: 146 MMHG | BODY MASS INDEX: 26.52 KG/M2

## 2024-08-20 DIAGNOSIS — Z82.49 FAMILY HISTORY OF CORONARY ARTERY DISEASE: ICD-10-CM

## 2024-08-20 DIAGNOSIS — Z00.00 ROUTINE GENERAL MEDICAL EXAMINATION AT A HEALTH CARE FACILITY: ICD-10-CM

## 2024-08-20 DIAGNOSIS — I25.119 CORONARY ARTERY DISEASE INVOLVING NATIVE CORONARY ARTERY OF NATIVE HEART WITH ANGINA PECTORIS (H): Primary | ICD-10-CM

## 2024-08-20 PROCEDURE — 99244 OFF/OP CNSLTJ NEW/EST MOD 40: CPT | Performed by: INTERNAL MEDICINE

## 2024-08-20 RX ORDER — AMOXICILLIN 500 MG
1200 CAPSULE ORAL DAILY
COMMUNITY

## 2024-08-20 NOTE — PATIENT INSTRUCTIONS
Katharina Rodríguez,    It was a pleasure to see you today at [facility].     My recommendations after this visit include:    Stress echo  Use [employer] to monitor your heart rhythm at home    IVETH Pimentel MD, FACC, DENISSE

## 2024-08-20 NOTE — PROGRESS NOTES
Thank you, Dr. Paula, for asking [facility] to evaluate Ms. Katharina Rodríguez.      Assessment/Recommendations   Assessment:    Coronary calcification consistent with coronary atherosclerosis  Hypercholesterolemia, untreated due to statin intolerance  Heart palpitation due to SVT, uncertain EP mechanism, well-controlled with carvedilol  History of frequent PVCs    Plan:  Stress test to rule out obstructive coronary artery disease  We discussed primary prevention of ischemic event including exercise, proper diet and cholesterol management.  She is intolerant of statin.  We will not be able to achieve appropriate levels of LDL cholesterol with Zetia.  Will consider PCSK9 when the results of the stress is available.    In regards to heart palpitations she can obtain [employer] to record one of the heart palpitation episodes.  Should she have breakthrough prolonged episode of heart palpitations will consider ablation.  Otherwise she will stay on carvedilol for now           History of Present Illness    Ms. Katharina Rodríguez is a 58 year old female who comes in for cardiac evaluation.  She has longstanding history of hypercholesterolemia.  She is not known to have obstructive coronary artery disease.  She underwent CT coronary calcium scan for further restratification.  She had mildly elevated levels.  She is not taking any cholesterol medication at present time.  She was on rosuvastatin and she developed severe muscle aching.  She denies exertional chest pains or shortness of breath.    She does have a history of heart palpitations.  She had few episodes of heart racing for several hours.  She has been taking atenolol in the past and now she is on carvedilol.  Her palpitations have improved significantly.  She has not any syncope.  She did have Zio patch monitor.  Doing period of monitoring she did not experience any prolonged heart racing.    Zio patch: March 2024  Indication:  "SVT  7 day Ziopatch monitor  Baseline sinus rhythm with heart rates ranging , average 80 bpm.  10 runs of SVT lasting up to 19 beats.  No sustained arrhythmias.      EKG: 3/4/2010 read by me normal sinus rhythm with PVCs    CT coronary calcium: March 2024    49     Physical Examination Review of Systems   BP (!) 146/74 (BP Location: Left arm, Patient Position: Sitting, Cuff Size: Adult Regular)   Pulse 88   Resp 14   Ht 1.715 m (5' 7.5\")   Wt 79.4 kg (175 lb)   LMP 05/01/2015 (Approximate)   BMI 27.00 kg/m    Body mass index is 27 kg/m .  Wt Readings from Last 3 Encounters:   08/20/24 79.4 kg (175 lb)   06/25/24 79.8 kg (176 lb)   05/21/24 79.4 kg (175 lb)   Patient was offered chaperone for physical examination.  She declined.  General Appearance:   Alert, cooperative, no distress, appears stated age   Head/ENT: Normocephalic, without obvious abnormality. Membranes moist      EYES:  no scleral icterus, normal conjunctivae   Neck: Supple, symmetrical, trachea midline, no adenopathy, thyroid: not enlarged, symmetric, no carotid bruit or JVD   Chest/Lungs:   Lungs are clear to auscultation, respirations unlabored. No tenderness or deformity    Cardiovascular:   Regular rhythm, S1, S2 normal, no murmur, rub or gallop.   Abdomen:  Soft, non-tender, bowel sounds active all four quadrants,  no masses, no organomegaly   Extremities: no cyanosis or clubbing. No edema   Skin: Skin color, texture, turgor normal, no rashes or lesions.    Psychiatric: Normal affect, calm   Neurologic: Alert and oriented x 3, moving all four extremities.     Enc Vitals  BP: (!) 146/74  Pulse: 88  Resp: 14  Weight: 79.4 kg (175 lb)  Height: 171.5 cm (5' 7.5\")                                          Lab Results    Chemistry/lipid CBC Cardiac Enzymes/BNP/TSH/INR   Recent Labs   Lab Test 06/25/24  0928   CHOL 263*   HDL 51   *   TRIG 152*     Recent Labs   Lab Test 06/25/24  0928 02/01/23  1547 11/29/21  0948   * 151* 184* " "    Recent Labs   Lab Test 06/25/24  0928      POTASSIUM 4.8   CHLORIDE 105   CO2 26   *   BUN 14.1   CR 0.99*   GFRESTIMATED 65   GABBI 9.3     Recent Labs   Lab Test 06/25/24  0928 02/01/23  1547 11/29/21  0948   CR 0.99* 0.90 0.83     Recent Labs   Lab Test 06/25/24  0928 02/01/23  1547   A1C 5.9* 5.8*          Recent Labs   Lab Test 06/25/24  0928   WBC 5.6   HGB 13.1   HCT 40.0   MCV 89        Recent Labs   Lab Test 06/25/24  0928   HGB 13.1    No results for input(s): \"TROPONINI\" in the last 00353 hours.  No results for input(s): \"BNP\", \"NTBNPI\", \"NTBNP\" in the last 69053 hours.  Recent Labs   Lab Test 02/01/23  1547   TSH 0.89     No results for input(s): \"INR\" in the last 03570 hours.     Medical History  Surgical History Family History Social History   Past Medical History:   Diagnosis Date    Acute midline low back pain without sciatica 09/2021    this has occurred one other time - would like PT in future if this occurs again    Cervical high risk HPV (human papillomavirus) test positive 11/2015,11/09/16    + HPV (not 16 or 18)    History of colposcopy with cervical biopsy 02/07/2017 02/07/17: [place] Bx's LSIL DEVYN 1.     Hypertension     NANY (obstructive sleep apnea) 2019    severe - uses CPAP    Posterior tibial tendinitis of right lower extremity 2019     Past Surgical History:   Procedure Laterality Date    COLONOSCOPY N/A 11/18/2015    RPT 10 years,  Kole Owen MD;  Location:  GI    NONE OF THE ABOVE CORE MEASURE DIAGNOSES       No premature CAD, SCD,cardiomyopathy   Social History     Socioeconomic History    Marital status:      Spouse name: Not on file    Number of children: Not on file    Years of education: Not on file    Highest education level: Not on file   Occupational History    Not on file   Tobacco Use    Smoking status: Never    Smokeless tobacco: Never   Vaping Use    Vaping status: Never Used   Substance and Sexual Activity    Alcohol use: Not " Currently    Drug use: No    Sexual activity: Not Currently     Partners: Male   Other Topics Concern    Parent/sibling w/ CABG, MI or angioplasty before 65F 55M? No   Social History Narrative    Not on file     Social Determinants of Health     Financial Resource Strain: Low Risk  (6/24/2024)    Financial Resource Strain     Within the past 12 months, have you or your family members you live with been unable to get utilities (heat, electricity) when it was really needed?: No   Food Insecurity: Low Risk  (6/24/2024)    Food Insecurity     Within the past 12 months, did you worry that your food would run out before you got money to buy more?: No     Within the past 12 months, did the food you bought just not last and you didn t have money to get more?: No   Transportation Needs: Low Risk  (6/24/2024)    Transportation Needs     Within the past 12 months, has lack of transportation kept you from medical appointments, getting your medicines, non-medical meetings or appointments, work, or from getting things that you need?: No   Physical Activity: Sufficiently Active (6/24/2024)    Exercise Vital Sign     Days of Exercise per Week: 5 days     Minutes of Exercise per Session: 30 min   Stress: No Stress Concern Present (6/24/2024)    [place] [employer]ational [employer] - Occupational Stress Questionnaire     Feeling of Stress : Not at all   Social Connections: Unknown (6/24/2024)    Social Connection and Isolation Panel [NHANES]     Frequency of Communication with Friends and Family: Not on file     Frequency of Social Gatherings with Friends and Family: More than three times a week     Attends Yarsanism Services: Not on file     Active Member of Clubs or Organizations: Not on file     Attends Club or Organization Meetings: Not on file     Marital Status: Not on file   Interpersonal Safety: Low Risk  (12/13/2023)    Interpersonal Safety     Do you feel physically and emotionally safe where you currently live?: Yes      Within the past 12 months, have you been hit, slapped, kicked or otherwise physically hurt by someone?: No     Within the past 12 months, have you been humiliated or emotionally abused in other ways by your partner or ex-partner?: No   Housing Stability: Low Risk  (6/24/2024)    Housing Stability     Do you have housing? : Yes     Are you worried about losing your housing?: No         Medications  Allergies   Current Outpatient Medications   Medication Sig Dispense Refill    carvedilol (COREG) 12.5 MG tablet Take 1 tablet (12.5 mg) by mouth 2 times daily (with meals) 180 tablet 4    gabapentin (NEURONTIN) 400 MG capsule Take 1 capsule by mouth in the evening 30 capsule 2    HEMP OIL OR EXTRACT OR OTHER CBD CANNABINOID, NOT MEDICAL CANNABIS, Take 1 drop by mouth at bedtime      L-Glutamine 500 MG CAPS Take 1 capsule by mouth daily      lactobacillus rhamnosus, GG, (CULTURELL) capsule Take 1 capsule by mouth daily      lisinopril (ZESTRIL) 10 MG tablet TAKE 1 TABLET BY MOUTH DAILY TAKE DAILY 90 tablet 3    Multiple Vitamin (MULTIVITAMIN OR) Take 1 capsule by mouth daily.      Omega-3 Fatty Acids (FISH OIL) 1200 MG capsule Take 1,200 mg by mouth daily      Vitamin D, Cholecalciferol, 1000 units CAPS Take 1 capsule by mouth daily          Allergies   Allergen Reactions    Crestor [Rosuvastatin] Muscle Pain (Myalgia)     Also joint pain and fatigue - better after 24 hours off medication

## 2024-08-20 NOTE — LETTER
8/20/2024    Becca Paula MD  [address]  [facility] [address]    RE: Katharina Rodríguez       Dear Colleague,     I had the pleasure of seeing Katharina Rodríguez in the [facility].        Thank you, Dr. Paula, for asking [facility] to evaluate Ms. Katharina Rodríguez.      Assessment/Recommendations   Assessment:    Coronary calcification consistent with coronary atherosclerosis  Hypercholesterolemia, untreated due to statin intolerance  Heart palpitation due to SVT, uncertain EP mechanism, well-controlled with carvedilol  History of frequent PVCs    Plan:  Stress test to rule out obstructive coronary artery disease  We discussed primary prevention of ischemic event including exercise, proper diet and cholesterol management.  She is intolerant of statin.  We will not be able to achieve appropriate levels of LDL cholesterol with Zetia.  Will consider PCSK9 when the results of the stress is available.    In regards to heart palpitations she can obtain [employer] to record one of the heart palpitation episodes.  Should she have breakthrough prolonged episode of heart palpitations will consider ablation.  Otherwise she will stay on carvedilol for now           History of Present Illness    Ms. Katharina Rodríguez is a 58 year old female who comes in for cardiac evaluation.  She has longstanding history of hypercholesterolemia.  She is not known to have obstructive coronary artery disease.  She underwent CT coronary calcium scan for further restratification.  She had mildly elevated levels.  She is not taking any cholesterol medication at present time.  She was on rosuvastatin and she developed severe muscle aching.  She denies exertional chest pains or shortness of breath.    She does have a history of heart palpitations.  She had few episodes of heart racing for several hours.  She has been taking atenolol in the past and now she is on carvedilol.  Her  "palpitations have improved significantly.  She has not any syncope.  She did have Zio patch monitor.  Doing period of monitoring she did not experience any prolonged heart racing.    Zio patch: March 2024  Indication: SVT  7 day Ziopatch monitor  Baseline sinus rhythm with heart rates ranging , average 80 bpm.  10 runs of SVT lasting up to 19 beats.  No sustained arrhythmias.      EKG: 3/4/2010 read by me normal sinus rhythm with PVCs    CT coronary calcium: March 2024    49     Physical Examination Review of Systems   BP (!) 146/74 (BP Location: Left arm, Patient Position: Sitting, Cuff Size: Adult Regular)   Pulse 88   Resp 14   Ht 1.715 m (5' 7.5\")   Wt 79.4 kg (175 lb)   LMP 05/01/2015 (Approximate)   BMI 27.00 kg/m    Body mass index is 27 kg/m .  Wt Readings from Last 3 Encounters:   08/20/24 79.4 kg (175 lb)   06/25/24 79.8 kg (176 lb)   05/21/24 79.4 kg (175 lb)   Patient was offered chaperone for physical examination.  She declined.  General Appearance:   Alert, cooperative, no distress, appears stated age   Head/ENT: Normocephalic, without obvious abnormality. Membranes moist      EYES:  no scleral icterus, normal conjunctivae   Neck: Supple, symmetrical, trachea midline, no adenopathy, thyroid: not enlarged, symmetric, no carotid bruit or JVD   Chest/Lungs:   Lungs are clear to auscultation, respirations unlabored. No tenderness or deformity    Cardiovascular:   Regular rhythm, S1, S2 normal, no murmur, rub or gallop.   Abdomen:  Soft, non-tender, bowel sounds active all four quadrants,  no masses, no organomegaly   Extremities: no cyanosis or clubbing. No edema   Skin: Skin color, texture, turgor normal, no rashes or lesions.    Psychiatric: Normal affect, calm   Neurologic: Alert and oriented x 3, moving all four extremities.     Enc Vitals  BP: (!) 146/74  Pulse: 88  Resp: 14  Weight: 79.4 kg (175 lb)  Height: 171.5 cm (5' 7.5\")                                          Lab Results  " "  Chemistry/lipid CBC Cardiac Enzymes/BNP/TSH/INR   Recent Labs   Lab Test 06/25/24  0928   CHOL 263*   HDL 51   *   TRIG 152*     Recent Labs   Lab Test 06/25/24  0928 02/01/23  1547 11/29/21  0948   * 151* 184*     Recent Labs   Lab Test 06/25/24  0928      POTASSIUM 4.8   CHLORIDE 105   CO2 26   *   BUN 14.1   CR 0.99*   GFRESTIMATED 65   GABBI 9.3     Recent Labs   Lab Test 06/25/24  0928 02/01/23  1547 11/29/21  0948   CR 0.99* 0.90 0.83     Recent Labs   Lab Test 06/25/24  0928 02/01/23  1547   A1C 5.9* 5.8*          Recent Labs   Lab Test 06/25/24  0928   WBC 5.6   HGB 13.1   HCT 40.0   MCV 89        Recent Labs   Lab Test 06/25/24  0928   HGB 13.1    No results for input(s): \"TROPONINI\" in the last 40383 hours.  No results for input(s): \"BNP\", \"NTBNPI\", \"NTBNP\" in the last 80059 hours.  Recent Labs   Lab Test 02/01/23  1547   TSH 0.89     No results for input(s): \"INR\" in the last 90441 hours.     Medical History  Surgical History Family History Social History   Past Medical History:   Diagnosis Date     Acute midline low back pain without sciatica 09/2021    this has occurred one other time - would like PT in future if this occurs again     Cervical high risk HPV (human papillomavirus) test positive 11/2015,11/09/16    + HPV (not 16 or 18)     History of colposcopy with cervical biopsy 02/07/2017 02/07/17: [place] Bx's LSIL DEVYN 1.      Hypertension      NANY (obstructive sleep apnea) 2019    severe - uses CPAP     Posterior tibial tendinitis of right lower extremity 2019     Past Surgical History:   Procedure Laterality Date     COLONOSCOPY N/A 11/18/2015    RPT 10 years,  Kole Owen MD;  Location: [facility]     NONE OF THE ABOVE CORE MEASURE DIAGNOSES       No premature CAD, SCD,cardiomyopathy   Social History     Socioeconomic History     Marital status:      Spouse name: Not on file     Number of children: Not on file     Years of education: Not on file     " Highest education level: Not on file   Occupational History     Not on file   Tobacco Use     Smoking status: Never     Smokeless tobacco: Never   Vaping Use     Vaping status: Never Used   Substance and Sexual Activity     Alcohol use: Not Currently     Drug use: No     Sexual activity: Not Currently     Partners: Male   Other Topics Concern     Parent/sibling w/ CABG, MI or angioplasty before 65F 55M? No   Social History Narrative     Not on file     Social Determinants of Health     Financial Resource Strain: Low Risk  (6/24/2024)    Financial Resource Strain      Within the past 12 months, have you or your family members you live with been unable to get utilities (heat, electricity) when it was really needed?: No   Food Insecurity: Low Risk  (6/24/2024)    Food Insecurity      Within the past 12 months, did you worry that your food would run out before you got money to buy more?: No      Within the past 12 months, did the food you bought just not last and you didn t have money to get more?: No   Transportation Needs: Low Risk  (6/24/2024)    Transportation Needs      Within the past 12 months, has lack of transportation kept you from medical appointments, getting your medicines, non-medical meetings or appointments, work, or from getting things that you need?: No   Physical Activity: Sufficiently Active (6/24/2024)    Exercise Vital Sign      Days of Exercise per Week: 5 days      Minutes of Exercise per Session: 30 min   Stress: No Stress Concern Present (6/24/2024)    [place]izen [place] of Occupational Health - Occupational Stress Questionnaire      Feeling of Stress : Not at all   Social Connections: Unknown (6/24/2024)    Social Connection and Isolation Panel [NHANES]      Frequency of Communication with Friends and Family: Not on file      Frequency of Social Gatherings with Friends and Family: More than three times a week      Attends Nondenominational Services: Not on file      Active Member of Clubs or  Organizations: Not on file      Attends Club or Organization Meetings: Not on file      Marital Status: Not on file   Interpersonal Safety: Low Risk  (12/13/2023)    Interpersonal Safety      Do you feel physically and emotionally safe where you currently live?: Yes      Within the past 12 months, have you been hit, slapped, kicked or otherwise physically hurt by someone?: No      Within the past 12 months, have you been humiliated or emotionally abused in other ways by your partner or ex-partner?: No   Housing Stability: Low Risk  (6/24/2024)    Housing Stability      Do you have housing? : Yes      Are you worried about losing your housing?: No         Medications  Allergies   Current Outpatient Medications   Medication Sig Dispense Refill     carvedilol (COREG) 12.5 MG tablet Take 1 tablet (12.5 mg) by mouth 2 times daily (with meals) 180 tablet 4     gabapentin (NEURONTIN) 400 MG capsule Take 1 capsule by mouth in the evening 30 capsule 2     HEMP OIL OR EXTRACT OR OTHER CBD CANNABINOID, NOT MEDICAL CANNABIS, Take 1 drop by mouth at bedtime       L-Glutamine 500 MG CAPS Take 1 capsule by mouth daily       lactobacillus rhamnosus, GG, (CULTURELL) capsule Take 1 capsule by mouth daily       lisinopril (ZESTRIL) 10 MG tablet TAKE 1 TABLET BY MOUTH DAILY TAKE DAILY 90 tablet 3     Multiple Vitamin (MULTIVITAMIN OR) Take 1 capsule by mouth daily.       Omega-3 Fatty Acids (FISH OIL) 1200 MG capsule Take 1,200 mg by mouth daily       Vitamin D, Cholecalciferol, 1000 units CAPS Take 1 capsule by mouth daily          Allergies   Allergen Reactions     Crestor [Rosuvastatin] Muscle Pain (Myalgia)     Also joint pain and fatigue - better after 24 hours off medication                         Thank you for allowing me to participate in the care of your patient.      Sincerely,     Gaston Pimentel MD     [facility]  cc:   Becca Paula MD  [id number] [facility]   [place] [address]

## 2024-08-26 ENCOUNTER — HOSPITAL ENCOUNTER (OUTPATIENT)
Dept: CARDIOLOGY | Facility: CLINIC | Age: 59
Discharge: HOME | End: 2024-08-26
Attending: INTERNAL MEDICINE | Admitting: INTERNAL MEDICINE
Payer: COMMERCIAL

## 2024-08-26 DIAGNOSIS — I25.119 CORONARY ARTERY DISEASE INVOLVING NATIVE CORONARY ARTERY OF NATIVE HEART WITH ANGINA PECTORIS (H): ICD-10-CM

## 2024-08-26 LAB
CV STRESS CURRENT BP HE: NORMAL
CV STRESS CURRENT HR HE: 103
CV STRESS CURRENT HR HE: 106
CV STRESS CURRENT HR HE: 118
CV STRESS CURRENT HR HE: 118
CV STRESS CURRENT HR HE: 122
CV STRESS CURRENT HR HE: 127
CV STRESS CURRENT HR HE: 138
CV STRESS CURRENT HR HE: 138
CV STRESS CURRENT HR HE: 146
CV STRESS CURRENT HR HE: 157
CV STRESS CURRENT HR HE: 160
CV STRESS CURRENT HR HE: 166
CV STRESS CURRENT HR HE: 166
CV STRESS CURRENT HR HE: 74
CV STRESS CURRENT HR HE: 75
CV STRESS CURRENT HR HE: 82
CV STRESS CURRENT HR HE: 86
CV STRESS CURRENT HR HE: 86
CV STRESS CURRENT HR HE: 87
CV STRESS CURRENT HR HE: 93
CV STRESS CURRENT HR HE: 94
CV STRESS CURRENT HR HE: 95
CV STRESS CURRENT HR HE: 96
CV STRESS CURRENT HR HE: 98
CV STRESS CURRENT HR HE: 99
CV STRESS DEVIATION TIME HE: NORMAL
CV STRESS ECHO PERCENT HR HE: NORMAL
CV STRESS EXERCISE STAGE HE: NORMAL
CV STRESS EXERCISE STAGE REACHED HE: NORMAL
CV STRESS FINAL RESTING BP HE: NORMAL
CV STRESS FINAL RESTING HR HE: 86
CV STRESS MAX HR HE: 166
CV STRESS MAX TREADMILL GRADE HE: 14
CV STRESS MAX TREADMILL SPEED HE: 3.4
CV STRESS PEAK DIA BP HE: NORMAL
CV STRESS PEAK SYS BP HE: NORMAL
CV STRESS PHASE HE: NORMAL
CV STRESS PROTOCOL HE: NORMAL
CV STRESS REASON STOPPED HE: NORMAL
CV STRESS RESTING PT POSITION HE: NORMAL
CV STRESS ST DEVIATION AMOUNT HE: NORMAL
CV STRESS ST DEVIATION ELEVATION HE: NORMAL
CV STRESS ST EVELATION AMOUNT HE: NORMAL
CV STRESS SYMPTOMS HE: NORMAL
CV STRESS TEST TYPE HE: NORMAL
CV STRESS TOTAL STAGE TIME MIN 1 HE: NORMAL
STRESS ECHO BASELINE DIASTOLIC HE: 104
STRESS ECHO BASELINE HR: 76
STRESS ECHO BASELINE SYSTOLIC BP: 174
STRESS ECHO LAST STRESS DIASTOLIC BP: 92
STRESS ECHO LAST STRESS HR: 166
STRESS ECHO LAST STRESS SYSTOLIC BP: 188
STRESS ECHO POST ESTIMATED WORKLOAD: 7.1
STRESS ECHO POST EXERCISE DUR MIN: 6
STRESS ECHO POST EXERCISE DUR SEC: 0
STRESS ECHO TARGET HR: 136

## 2024-08-26 PROCEDURE — 93350 STRESS TTE ONLY: CPT | Mod: TC

## 2024-08-26 PROCEDURE — 93325 DOPPLER ECHO COLOR FLOW MAPG: CPT | Mod: TC

## 2024-08-26 PROCEDURE — 93018 CV STRESS TEST I&R ONLY: CPT | Performed by: INTERNAL MEDICINE

## 2024-08-26 PROCEDURE — 93016 CV STRESS TEST SUPVJ ONLY: CPT | Performed by: INTERNAL MEDICINE

## 2024-08-26 PROCEDURE — 93350 STRESS TTE ONLY: CPT | Mod: 26 | Performed by: INTERNAL MEDICINE

## 2024-08-26 PROCEDURE — 93325 DOPPLER ECHO COLOR FLOW MAPG: CPT | Mod: 26 | Performed by: INTERNAL MEDICINE

## 2024-08-26 PROCEDURE — 93321 DOPPLER ECHO F-UP/LMTD STD: CPT | Mod: 26 | Performed by: INTERNAL MEDICINE

## 2024-08-27 ENCOUNTER — MYC MEDICAL ADVICE (OUTPATIENT)
Dept: CARDIOLOGY | Facility: CLINIC | Age: 59
End: 2024-08-27
Payer: COMMERCIAL

## 2024-08-27 DIAGNOSIS — E78.5 HYPERLIPIDEMIA LDL GOAL <70: ICD-10-CM

## 2024-08-27 DIAGNOSIS — I25.119 CORONARY ARTERY DISEASE INVOLVING NATIVE CORONARY ARTERY OF NATIVE HEART WITH ANGINA PECTORIS (H): Primary | ICD-10-CM

## 2024-08-28 ENCOUNTER — TELEPHONE (OUTPATIENT)
Dept: CARDIOLOGY | Facility: CLINIC | Age: 59
End: 2024-08-28
Payer: COMMERCIAL

## 2024-08-28 NOTE — TELEPHONE ENCOUNTER
Central Prior Authorization Team   Phone: [phone]    PA Initiation    Medication: Repatha SureClick 140MG/ML auto-injectors    Insurance Company: [employer] - Phone [phone] Fax [phone]  Pharmacy Filling the Rx: [id number] - [place], [place] - [address]  Filling Pharmacy Phone: [phone]  Filling Pharmacy Fax:    Start Date: 8/28/2024

## 2024-08-29 NOTE — TELEPHONE ENCOUNTER
Prior Authorization Approval    Authorization Effective Date: 8/29/2024  Authorization Expiration Date: 8/29/2025  Medication: Repatha SureClick 140MG/ML auto-injectors    Approved Dose/Quantity:   Reference #:     Insurance Company: [employer] - Phone [phone] Fax [phone]  Expected CoPay:       CoPay Card Available:      Foundation Assistance Needed:    Which Pharmacy is filling the prescription (Not needed for infusion/clinic administered): [id number] [place] [address]  Pharmacy Notified:  yes  Patient Notified:  yes- Pharmacy will contact patient when ready to /ship

## 2024-10-04 ENCOUNTER — MYC REFILL (OUTPATIENT)
Dept: SLEEP MEDICINE | Facility: CLINIC | Age: 59
End: 2024-10-04
Payer: COMMERCIAL

## 2024-10-04 DIAGNOSIS — G25.81 RESTLESS LEGS SYNDROME (RLS): ICD-10-CM

## 2024-10-07 RX ORDER — GABAPENTIN 400 MG/1
CAPSULE ORAL
Qty: 90 CAPSULE | Refills: 3 | Status: SHIPPED | OUTPATIENT
Start: 2024-10-07

## 2024-10-07 NOTE — TELEPHONE ENCOUNTER
Pending Prescriptions:                       Disp   Refills    gabapentin (NEURONTIN) 400 MG capsule     30 cap*2            Sig: Take 1 capsule by mouth in the evening          Last Written Prescription Date: 7/2/24    Last Fill Quantity: 30   # refills: 2  Last Office Visit: 5/21/24  Future Office visit: JASIEL Keane refill request to provider for review/approval because:  Drug not on the G, P or [facility] refill protocol or controlled substance

## 2024-10-17 ENCOUNTER — DOCUMENTATION ONLY (OUTPATIENT)
Dept: SLEEP MEDICINE | Facility: CLINIC | Age: 59
End: 2024-10-17
Payer: COMMERCIAL

## 2024-10-17 NOTE — PROGRESS NOTES
Patient seen for a mask consult at [facility]. She was recommended by her sleep doctor to try a full face mask due to nasal congestion. Patient chose the [employer] Vitera medium cushion full face mask.

## 2024-11-25 ENCOUNTER — LAB (OUTPATIENT)
Dept: CARDIOLOGY | Facility: CLINIC | Age: 59
End: 2024-11-25
Payer: COMMERCIAL

## 2024-11-25 DIAGNOSIS — I25.119 CORONARY ARTERY DISEASE INVOLVING NATIVE CORONARY ARTERY OF NATIVE HEART WITH ANGINA PECTORIS (H): ICD-10-CM

## 2024-11-25 DIAGNOSIS — E78.5 HYPERLIPIDEMIA LDL GOAL <70: ICD-10-CM

## 2024-11-25 LAB
CHOLEST SERPL-MCNC: 144 MG/DL
FASTING STATUS PATIENT QL REPORTED: YES
HDLC SERPL-MCNC: 51 MG/DL
LDLC SERPL CALC-MCNC: 57 MG/DL
NONHDLC SERPL-MCNC: 93 MG/DL
TRIGL SERPL-MCNC: 180 MG/DL

## 2024-11-25 PROCEDURE — 36415 COLL VENOUS BLD VENIPUNCTURE: CPT

## 2024-11-25 PROCEDURE — 80061 LIPID PANEL: CPT

## 2024-12-30 ENCOUNTER — OFFICE VISIT (OUTPATIENT)
Dept: FAMILY MEDICINE | Facility: CLINIC | Age: 59
End: 2024-12-30
Payer: COMMERCIAL

## 2024-12-30 VITALS
HEIGHT: 68 IN | HEART RATE: 87 BPM | TEMPERATURE: 98.5 F | WEIGHT: 170.2 LBS | RESPIRATION RATE: 12 BRPM | SYSTOLIC BLOOD PRESSURE: 138 MMHG | BODY MASS INDEX: 25.79 KG/M2 | DIASTOLIC BLOOD PRESSURE: 78 MMHG | OXYGEN SATURATION: 98 %

## 2024-12-30 DIAGNOSIS — I10 BENIGN HYPERTENSION: Primary | ICD-10-CM

## 2024-12-30 DIAGNOSIS — I47.10 SVT (SUPRAVENTRICULAR TACHYCARDIA) (H): ICD-10-CM

## 2024-12-30 DIAGNOSIS — Z82.49 FAMILY HISTORY OF CORONARY ARTERY DISEASE: ICD-10-CM

## 2024-12-30 DIAGNOSIS — G25.81 RESTLESS LEGS SYNDROME (RLS): ICD-10-CM

## 2024-12-30 PROBLEM — I25.119 CORONARY ARTERY DISEASE INVOLVING NATIVE CORONARY ARTERY OF NATIVE HEART WITH ANGINA PECTORIS (H): Status: RESOLVED | Noted: 2024-08-20 | Resolved: 2024-12-30

## 2024-12-30 PROCEDURE — 99214 OFFICE O/P EST MOD 30 MIN: CPT | Performed by: FAMILY MEDICINE

## 2024-12-30 RX ORDER — LISINOPRIL 10 MG/1
TABLET ORAL
Qty: 90 TABLET | Refills: 3 | Status: CANCELLED | OUTPATIENT
Start: 2024-12-30

## 2024-12-30 RX ORDER — LISINOPRIL 10 MG/1
TABLET ORAL
Qty: 90 TABLET | Refills: 3 | Status: SHIPPED | OUTPATIENT
Start: 2024-12-30

## 2024-12-30 RX ORDER — ASPIRIN 81 MG/1
81 TABLET ORAL DAILY
Status: SHIPPED
Start: 2024-12-30

## 2024-12-30 RX ORDER — UREA 10 %
45 LOTION (ML) TOPICAL DAILY
Qty: 90 TABLET | Refills: 4 | COMMUNITY
Start: 2024-12-30

## 2024-12-30 ASSESSMENT — PAIN SCALES - GENERAL: PAINLEVEL_OUTOF10: SEVERE PAIN (6)

## 2024-12-30 NOTE — PROGRESS NOTES
"  Assessment & Plan     Benign hypertension  Under good  control  Refills per [facility]are    - lisinopril (ZESTRIL) 10 MG tablet  Dispense: 90 tablet; Refill: 3    SVT (supraventricular tachycardia) (H)  Under control  No concerns     Family history of coronary artery disease  Has CAC score of 49  - aspirin 81 MG EC tablet    Restless legs syndrome (RLS)  Add low dose asa - could consider increase dose - patient wishes to give this some more time   - ferrous sulfate 45 MG TBCR CR tablet  Dispense: 90 tablet; Refill: 4            BMI  Estimated body mass index is 26.26 kg/m  as calculated from the following:    Height as of this encounter: 1.715 m (5' 7.5\").    Weight as of this encounter: 77.2 kg (170 lb 3.2 oz).         FUTURE APPOINTMENTS:       - Follow-up visit in 6 months for physical   See Patient Instructions    Richard Posadas is a 59 year old, presenting for the following health issues:   she is here for blood pressure recheck.   She has now been on repatha for a few months for her elevated calcium score.    She is not on asa.    She is tolerating the medication fine.   Her RLS seems a little worse lately.      Recheck Medication (gabapentin (NEURONTIN) 400 MG capsule - recheck on meds//), Hypertension (Recheck hypertension / lisinopril (ZESTRIL) 10 MG tablet /), and Musculoskeletal Problem (Right foot still an issue with pain since 2019 / left foot two toes injured Saturday morning)        12/30/2024     1:44 PM   Additional Questions   Roomed by carmel gallegos     Musculoskeletal Problem    History of Present Illness       Reason for visit:  Annual visit She is missing 1 dose(s) of medications per week.  She is not taking prescribed medications regularly due to remembering to take.             Pain History:  When did you first notice your pain? Saturday injury on the left foot   Have you seen anyone else for your pain? No  How has your pain affected your ability to work?   Where in your body do you " have pain? Musculoskeletal problem/pain  Onset/Duration: left pinky toe  Description  Location: foot - left  Joint Swelling: YES  Redness: YES  Pain: YES  Warmth: YES  Intensity:  moderate  Progression of Symptoms:  worsening  Accompanying signs and symptoms:   Fevers:   Numbness/tingling/weakness:   History  Trauma to the area: YES  Recent illness:  No  Previous similar problem: No  Previous evaluation:  No  Precipitating or alleviating factors:  Aggravating factors include: standing and walking  Therapies tried and outcome:     Past Medical History:   Diagnosis Date    Acute midline low back pain without sciatica 09/2021    this has occurred one other time - would like PT in future if this occurs again    Cervical high risk HPV (human papillomavirus) test positive 11/2015,11/09/16    + HPV (not 16 or 18)    History of colposcopy with cervical biopsy 02/07/2017 02/07/17: [facility] LSIL DEVYN 1.     Hypertension     NANY (obstructive sleep apnea) 2019    severe - uses CPAP    Posterior tibial tendinitis of right lower extremity 2019       Past Surgical History:   Procedure Laterality Date    COLONOSCOPY N/A 11/18/2015    RPT 10 years,  Kole Owen MD;  Location:  GI    NONE OF THE ABOVE CORE MEASURE DIAGNOSES         MEDICATIONS:  Current Outpatient Medications   Medication Sig Dispense Refill    aspirin 81 MG EC tablet Take 1 tablet (81 mg) by mouth daily.      carvedilol (COREG) 12.5 MG tablet Take 1 tablet (12.5 mg) by mouth 2 times daily (with meals) 180 tablet 4    evolocumab (REPATHA) 140 MG/ML prefilled autoinjector Inject 1 mL (140 mg) subcutaneously every 14 days. 2 mL 11    ferrous sulfate 45 MG TBCR CR tablet Take 1 tablet (45 mg) by mouth daily. 90 tablet 4    gabapentin (NEURONTIN) 400 MG capsule Take 1 capsule by mouth in the evening 90 capsule 3    HEMP OIL OR EXTRACT OR OTHER CBD CANNABINOID, NOT MEDICAL CANNABIS, Take 1 drop by mouth at bedtime      L-Glutamine 500 MG CAPS Take 1 capsule by  "mouth daily      lactobacillus rhamnosus, GG, (CULTURELL) capsule Take 1 capsule by mouth daily      lisinopril (ZESTRIL) 10 MG tablet TAKE 1 TABLET BY MOUTH DAILY TAKE DAILY 90 tablet 3    Multiple Vitamin (MULTIVITAMIN OR) Take 1 capsule by mouth daily.      Omega-3 Fatty Acids (FISH OIL) 1200 MG capsule Take 1,200 mg by mouth daily      Vitamin D, Cholecalciferol, 1000 units CAPS Take 1 capsule by mouth daily       No current facility-administered medications for this visit.       SOCIAL HISTORY:  Social History     Tobacco Use    Smoking status: Never    Smokeless tobacco: Never   Substance Use Topics    Alcohol use: Not Currently       Family History   Problem Relation Age of Onset    Hypertension Father     C.A.D. Father     Heart Disease Father     Psychotic Disorder Sister         bi polar    Movement disorder Sister         could be meds for mood             Review of Systems  Constitutional, HEENT, cardiovascular, pulmonary, gi and gu systems are negative, except as otherwise noted.      Objective    /78 (BP Location: Left arm, Patient Position: Sitting, Cuff Size: Adult Regular)   Pulse 87   Temp 98.5  F (36.9  C) (Oral)   Resp 12   Ht 1.715 m (5' 7.5\")   Wt 77.2 kg (170 lb 3.2 oz)   LMP 05/01/2015 (Approximate)   SpO2 98%   BMI 26.26 kg/m    Body mass index is 26.26 kg/m .  Physical Exam   GENERAL: alert and no distress  EYES: Eyes grossly normal to inspection, PERRL and conjunctivae and sclerae normal  NECK: no adenopathy, no asymmetry, masses, or scars  RESP: lungs clear to auscultation - no rales, rhonchi or wheezes  CV: regular rate and rhythm, normal S1 S2, no S3 or S4, no murmur, click or rub, no peripheral edema  MS: no gross musculoskeletal defects noted, no edema  SKIN: no suspicious lesions or rashes  NEURO: Normal strength and tone, mentation intact and speech normal  PSYCH: mentation appears normal, affect normal/bright  LYMPH: no cervical, supraclavicular, axillary, or inguinal " adenopathy    Lab on 11/25/2024   Component Date Value Ref Range Status    Cholesterol 11/25/2024 144  <200 mg/dL Final    Triglycerides 11/25/2024 180 (H)  <150 mg/dL Final    Direct Measure HDL 11/25/2024 51  >=50 mg/dL Final    LDL Cholesterol Calculated 11/25/2024 57  <100 mg/dL Final    Non HDL Cholesterol 11/25/2024 93  <130 mg/dL Final    Patient Fasting > 8hrs? 11/25/2024 Yes   Final           Signed Electronically by: Becca Paula MD

## 2025-04-22 NOTE — TELEPHONE ENCOUNTER
Caller: Mony Echeverria     Best call back number: [phone]     Requested Prescriptions:   famotidine (Pepcid) 20 MG tablet      Pharmacy where request should be sent:   [facility]    Additional details provided by patient:  Medication was given in the ER, she thought she had a good amount left but realized they were . Would like a prescription sent to [facility] please.     Does the patient have less than a 3 day supply:  [x] Yes  [] No     Would you like a call back once the refill request has been completed: [x] Yes [] No     If the office needs to give you a call back, can they leave a voicemail: [x] Yes [] No   Patient stopped in and is requesting that her BP med refill be CHANGED and sent to [facility]. It was sent to [facility] order but they didn't refill it and they said they didn't have it. Any questions please call her at [phone]. Call her when script has been sent over as well.

## 2025-05-06 ENCOUNTER — ANCILLARY PROCEDURE (OUTPATIENT)
Dept: MAMMOGRAPHY | Facility: CLINIC | Age: 60
End: 2025-05-06
Attending: FAMILY MEDICINE
Payer: COMMERCIAL

## 2025-05-06 DIAGNOSIS — Z12.31 VISIT FOR SCREENING MAMMOGRAM: ICD-10-CM

## 2025-05-13 ENCOUNTER — ANCILLARY PROCEDURE (OUTPATIENT)
Dept: MAMMOGRAPHY | Facility: CLINIC | Age: 60
End: 2025-05-13
Attending: FAMILY MEDICINE
Payer: COMMERCIAL

## 2025-05-13 DIAGNOSIS — Z12.31 VISIT FOR SCREENING MAMMOGRAM: ICD-10-CM

## 2025-05-13 PROCEDURE — 77063 BREAST TOMOSYNTHESIS BI: CPT | Mod: TC | Performed by: RADIOLOGY

## 2025-05-13 PROCEDURE — 77067 SCR MAMMO BI INCL CAD: CPT | Mod: TC | Performed by: RADIOLOGY

## 2025-06-25 DIAGNOSIS — I25.119 CORONARY ARTERY DISEASE INVOLVING NATIVE CORONARY ARTERY OF NATIVE HEART WITH ANGINA PECTORIS: ICD-10-CM

## 2025-06-25 DIAGNOSIS — E78.5 HYPERLIPIDEMIA LDL GOAL <70: ICD-10-CM

## 2025-06-26 RX ORDER — EVOLOCUMAB 140 MG/ML
INJECTION, SOLUTION SUBCUTANEOUS
Qty: 2 ML | Refills: 1 | Status: SHIPPED | OUTPATIENT
Start: 2025-06-26

## 2025-06-30 ENCOUNTER — OFFICE VISIT (OUTPATIENT)
Dept: PEDIATRICS | Facility: CLINIC | Age: 60
End: 2025-06-30
Payer: COMMERCIAL

## 2025-06-30 VITALS
TEMPERATURE: 98 F | RESPIRATION RATE: 18 BRPM | HEIGHT: 68 IN | SYSTOLIC BLOOD PRESSURE: 135 MMHG | HEART RATE: 68 BPM | BODY MASS INDEX: 26.22 KG/M2 | OXYGEN SATURATION: 98 % | WEIGHT: 173 LBS | DIASTOLIC BLOOD PRESSURE: 81 MMHG

## 2025-06-30 DIAGNOSIS — I10 BENIGN HYPERTENSION: ICD-10-CM

## 2025-06-30 DIAGNOSIS — Z00.00 ROUTINE GENERAL MEDICAL EXAMINATION AT A HEALTH CARE FACILITY: Primary | ICD-10-CM

## 2025-06-30 DIAGNOSIS — M25.551 BILATERAL HIP PAIN: ICD-10-CM

## 2025-06-30 DIAGNOSIS — M25.552 BILATERAL HIP PAIN: ICD-10-CM

## 2025-06-30 DIAGNOSIS — Z82.49 FAMILY HISTORY OF CORONARY ARTERY DISEASE: ICD-10-CM

## 2025-06-30 DIAGNOSIS — G25.81 RESTLESS LEGS SYNDROME (RLS): ICD-10-CM

## 2025-06-30 DIAGNOSIS — I47.10 SVT (SUPRAVENTRICULAR TACHYCARDIA): ICD-10-CM

## 2025-06-30 LAB
ALBUMIN SERPL BCG-MCNC: 4.3 G/DL (ref 3.5–5.2)
ALP SERPL-CCNC: 81 U/L (ref 40–150)
ALT SERPL W P-5'-P-CCNC: 17 U/L (ref 0–50)
ANION GAP SERPL CALCULATED.3IONS-SCNC: 10 MMOL/L (ref 7–15)
AST SERPL W P-5'-P-CCNC: 24 U/L (ref 0–45)
BILIRUB SERPL-MCNC: 0.2 MG/DL
BUN SERPL-MCNC: 12.4 MG/DL (ref 8–23)
CALCIUM SERPL-MCNC: 9.5 MG/DL (ref 8.8–10.4)
CHLORIDE SERPL-SCNC: 106 MMOL/L (ref 98–107)
CHOLEST SERPL-MCNC: 144 MG/DL
CREAT SERPL-MCNC: 0.9 MG/DL (ref 0.51–0.95)
EGFRCR SERPLBLD CKD-EPI 2021: 72 ML/MIN/1.73M2
ERYTHROCYTE [DISTWIDTH] IN BLOOD BY AUTOMATED COUNT: 12.5 % (ref 10–15)
FASTING STATUS PATIENT QL REPORTED: ABNORMAL
FASTING STATUS PATIENT QL REPORTED: ABNORMAL
FERRITIN SERPL-MCNC: 61 NG/ML (ref 11–328)
FOLATE SERPL-MCNC: 24.1 NG/ML (ref 4.6–34.8)
GLUCOSE SERPL-MCNC: 103 MG/DL (ref 70–99)
HCO3 SERPL-SCNC: 27 MMOL/L (ref 22–29)
HCT VFR BLD AUTO: 41.5 % (ref 35–47)
HDLC SERPL-MCNC: 54 MG/DL
HGB BLD-MCNC: 13.6 G/DL (ref 11.7–15.7)
IRON BINDING CAPACITY (ROCHE): 315 UG/DL (ref 240–430)
IRON SATN MFR SERPL: 29 % (ref 15–46)
IRON SERPL-MCNC: 91 UG/DL (ref 37–145)
LDLC SERPL CALC-MCNC: 53 MG/DL
MCH RBC QN AUTO: 29.1 PG (ref 26.5–33)
MCHC RBC AUTO-ENTMCNC: 32.8 G/DL (ref 31.5–36.5)
MCV RBC AUTO: 89 FL (ref 78–100)
NONHDLC SERPL-MCNC: 90 MG/DL
PLATELET # BLD AUTO: 264 10E3/UL (ref 150–450)
POTASSIUM SERPL-SCNC: 4.6 MMOL/L (ref 3.4–5.3)
PROT SERPL-MCNC: 7 G/DL (ref 6.4–8.3)
RBC # BLD AUTO: 4.68 10E6/UL (ref 3.8–5.2)
SODIUM SERPL-SCNC: 143 MMOL/L (ref 135–145)
TRIGL SERPL-MCNC: 185 MG/DL
VIT B12 SERPL-MCNC: 559 PG/ML (ref 232–1245)
WBC # BLD AUTO: 6 10E3/UL (ref 4–11)

## 2025-06-30 PROCEDURE — 99214 OFFICE O/P EST MOD 30 MIN: CPT | Mod: 25 | Performed by: INTERNAL MEDICINE

## 2025-06-30 PROCEDURE — 82607 VITAMIN B-12: CPT | Performed by: INTERNAL MEDICINE

## 2025-06-30 PROCEDURE — 82746 ASSAY OF FOLIC ACID SERUM: CPT | Performed by: INTERNAL MEDICINE

## 2025-06-30 PROCEDURE — 85027 COMPLETE CBC AUTOMATED: CPT | Performed by: INTERNAL MEDICINE

## 2025-06-30 PROCEDURE — 83540 ASSAY OF IRON: CPT | Performed by: INTERNAL MEDICINE

## 2025-06-30 PROCEDURE — 3079F DIAST BP 80-89 MM HG: CPT | Performed by: INTERNAL MEDICINE

## 2025-06-30 PROCEDURE — G2211 COMPLEX E/M VISIT ADD ON: HCPCS | Performed by: INTERNAL MEDICINE

## 2025-06-30 PROCEDURE — 80061 LIPID PANEL: CPT | Performed by: INTERNAL MEDICINE

## 2025-06-30 PROCEDURE — 3075F SYST BP GE 130 - 139MM HG: CPT | Performed by: INTERNAL MEDICINE

## 2025-06-30 PROCEDURE — 1126F AMNT PAIN NOTED NONE PRSNT: CPT | Performed by: INTERNAL MEDICINE

## 2025-06-30 PROCEDURE — 80053 COMPREHEN METABOLIC PANEL: CPT | Performed by: INTERNAL MEDICINE

## 2025-06-30 PROCEDURE — 83550 IRON BINDING TEST: CPT | Performed by: INTERNAL MEDICINE

## 2025-06-30 PROCEDURE — 36415 COLL VENOUS BLD VENIPUNCTURE: CPT | Performed by: INTERNAL MEDICINE

## 2025-06-30 PROCEDURE — 99396 PREV VISIT EST AGE 40-64: CPT | Performed by: INTERNAL MEDICINE

## 2025-06-30 PROCEDURE — 82728 ASSAY OF FERRITIN: CPT | Performed by: INTERNAL MEDICINE

## 2025-06-30 RX ORDER — CARVEDILOL 12.5 MG/1
12.5 TABLET ORAL 2 TIMES DAILY WITH MEALS
Qty: 180 TABLET | Refills: 4 | Status: SHIPPED | OUTPATIENT
Start: 2025-06-30

## 2025-06-30 RX ORDER — GABAPENTIN 300 MG/1
600 CAPSULE ORAL AT BEDTIME
Qty: 180 CAPSULE | Refills: 3 | Status: SHIPPED | OUTPATIENT
Start: 2025-06-30

## 2025-06-30 RX ORDER — LISINOPRIL 10 MG/1
TABLET ORAL
Qty: 90 TABLET | Refills: 3 | Status: SHIPPED | OUTPATIENT
Start: 2025-06-30

## 2025-06-30 SDOH — HEALTH STABILITY: PHYSICAL HEALTH: ON AVERAGE, HOW MANY DAYS PER WEEK DO YOU ENGAGE IN MODERATE TO STRENUOUS EXERCISE (LIKE A BRISK WALK)?: 5 DAYS

## 2025-06-30 SDOH — HEALTH STABILITY: PHYSICAL HEALTH: ON AVERAGE, HOW MANY MINUTES DO YOU ENGAGE IN EXERCISE AT THIS LEVEL?: 30 MIN

## 2025-06-30 ASSESSMENT — PAIN SCALES - GENERAL: PAINLEVEL_OUTOF10: NO PAIN (0)

## 2025-06-30 ASSESSMENT — SOCIAL DETERMINANTS OF HEALTH (SDOH): HOW OFTEN DO YOU GET TOGETHER WITH FRIENDS OR RELATIVES?: MORE THAN THREE TIMES A WEEK

## 2025-06-30 NOTE — PROGRESS NOTES
Preventive Care Visit  [facility] JOSEPH Ramirez MD, Internal Medicine - Pediatrics  Jun 30, 2025      Assessment & Plan     Routine general medical examination at a health care facility  New pt, establishing care.    Family history of coronary artery disease  Positive CAC score as well.  On repatha (statin intolerance) and ASA per cardiology.  On carvedilol for SVT.  - carvedilol (COREG) 12.5 MG tablet; Take 1 tablet (12.5 mg) by mouth 2 times daily (with meals).  - Lipid panel reflex to direct LDL Fasting; Future  - Comprehensive metabolic panel (BMP + Alb, Alk Phos, ALT, AST, Total. Bili, TP); Future    SVT (supraventricular tachycardia)  - Stable, no side effects with medications, refilled meds today    - carvedilol (COREG) 12.5 MG tablet; Take 1 tablet (12.5 mg) by mouth 2 times daily (with meals).    Benign hypertension  - Stable, no side effects with medications, refilled meds today    - lisinopril (ZESTRIL) 10 MG tablet; TAKE 1 TABLET BY MOUTH DAILY TAKE DAILY    Restless legs syndrome (RLS)  Difficult to tell if some of this is exacerbated by MSK hip pain (see below).  - Iron and iron binding capacity; Future  - Ferritin; Future  - CBC with platelets; Future  - Vitamin B12; Future  - Folate; Future  - gabapentin (NEURONTIN) 300 MG capsule; Take 2 capsules (600 mg) by mouth at bedtime. Take 1 capsule by mouth in the evening    Bilateral hip pain  Tight hip flexors that cause more problems with increased physical activity, such as gardening this summer.  ROM is somewhat limited on both sides, but this may be due to hip flexor tightness vs arthritis.  Will start with PT.    - Physical Therapy  Referral; Future      The longitudinal plan of care for the diagnosis(es)/condition(s) as documented were addressed during this visit. Due to the added complexity in care, I will continue to support Katharina in the subsequent management and with ongoing continuity of  "care.      BMI  Estimated body mass index is 26.7 kg/m  as calculated from the following:    Height as of this encounter: 1.715 m (5' 7.5\").    Weight as of this encounter: 78.5 kg (173 lb).       Counseling  Appropriate preventive services were addressed with this patient via screening, questionnaire, or discussion as appropriate for fall prevention, nutrition, physical activity, Tobacco-use cessation, social engagement, weight loss and cognition.  Checklist reviewing preventive services available has been given to the patient.  Reviewed patient's diet, addressing concerns and/or questions.   She is at risk for psychosocial distress and has been provided with information to reduce risk.         Follow-up    Follow-up Visit   Expected date:  Jun 30, 2026 (Approximate)      Follow Up Appointment Details:     Follow-up with whom?: PCP    Follow-Up for what?: Adult Preventive    How?: In Person                 Richard Posadas is a 59 year old, presenting for the following:  Annual Visit (Est. care)        6/30/2025     8:49 AM   Additional Questions   Roomed by olga   Accompanied by chente         6/30/2025   Forms   Any forms needing to be completed Yes         6/30/2025     8:49 AM   Patient Reported Additional Medications   Patient reports taking the following new medications na          HPI  NANY - CPAP machine    Teacher - bryce.             Advance Care Planning    Discussed advance care planning with patient; informed AVS has link to [facility].        6/30/2025   General Health   How would you rate your overall physical health? Good   Feel stress (tense, anxious, or unable to sleep) To some extent   (!) STRESS CONCERN      6/30/2025   Nutrition   Three or more servings of calcium each day? Yes   Diet: Regular (no restrictions)   How many servings of fruit and vegetables per day? (!) 2-3   How many sweetened beverages each day? 0-1         6/30/2025   Exercise   Days per week of moderate/strenous " exercise 5 days   Average minutes spent exercising at this level 30 min         6/30/2025   Social Factors   Frequency of gathering with friends or relatives More than three times a week   Worry food won't last until get money to buy more No   Food not last or not have enough money for food? No   Do you have housing? (Housing is defined as stable permanent housing and does not include staying outside in a car, in a tent, in an abandoned building, in an overnight shelter, or couch-surfing.) Yes   Are you worried about losing your housing? No   Lack of transportation? No   Unable to get utilities (heat,electricity)? No         6/30/2025   Fall Risk   Fallen 2 or more times in the past year? No   Trouble with walking or balance? No          6/30/2025   Dental   Dentist two times every year? Yes         Today's PHQ-2 Score:       6/30/2025     6:58 AM   PHQ-2 ( 1999 Pf[employer])   Q1: Little interest or pleasure in doing things 1   Q2: Feeling down, depressed or hopeless 1   PHQ-2 Score 2    Q1: Little interest or pleasure in doing things Several days   Q2: Feeling down, depressed or hopeless Several days   PHQ-2 Score 2       Patient-reported           6/30/2025   Substance Use   Alcohol more than 3/day or more than 7/wk No   Do you use any other substances recreationally? No     Social History     Tobacco Use    Smoking status: Never     Passive exposure: Never    Smokeless tobacco: Never   Vaping Use    Vaping status: Never Used   Substance Use Topics    Alcohol use: Not Currently    Drug use: No           5/13/2025   LAST FHS-7 RESULTS   1st degree relative breast or ovarian cancer No   Any relative bilateral breast cancer No   Any male have breast cancer No   Any ONE woman have BOTH breast AND ovarian cancer No   Any woman with breast cancer before 50yrs No   2 or more relatives with breast AND/OR ovarian cancer No   2 or more relatives with breast AND/OR bowel cancer No        Mammogram Screening - Mammogram every 1-2  "years updated in Health Maintenance based on mutual decision making        6/30/2025   STI Screening   New sexual partner(s) since last STI/HIV test? No     History of abnormal Pap smear: No - age 30- 64 PAP with HPV every 5 years recommended        Latest Ref Rng & Units 6/25/2024     8:51 AM 11/29/2021     9:23 AM 1/10/2018     3:52 PM   PAP / HPV   PAP  Negative for Intraepithelial Lesion or Malignancy (NILM)  Negative for Intraepithelial Lesion or Malignancy (NILM)     HPV 16 DNA Negative Negative  Negative  Negative    HPV 18 DNA Negative Negative  Negative  Negative    Other HR HPV Negative Negative  Negative  Negative      ASCVD Risk   The 10-year ASCVD risk score (Michael MERCEDES, et al., 2019) is: 4.5%    Values used to calculate the score:      Age: 59 years      Sex: Female      Is Non- : No      Diabetic: No      Tobacco smoker: No      Systolic Blood Pressure: 135 mmHg      Is BP treated: Yes      HDL Cholesterol: 51 mg/dL      Total Cholesterol: 144 mg/dL           Reviewed and updated as needed this visit by Provider                    Lab work is in process      Review of Systems  All other systems on a 10-point review are negative, unless otherwise noted in HPI       Objective    Exam  /81 (BP Location: Right arm, Patient Position: Sitting, Cuff Size: Adult Regular)   Pulse 68   Temp 98  F (36.7  C) (Temporal)   Resp 18   Ht 1.715 m (5' 7.5\")   Wt 78.5 kg (173 lb)   LMP 05/01/2015 (Approximate)   SpO2 98%   BMI 26.70 kg/m     Estimated body mass index is 26.7 kg/m  as calculated from the following:    Height as of this encounter: 1.715 m (5' 7.5\").    Weight as of this encounter: 78.5 kg (173 lb).    Physical Exam  GENERAL: alert and no distress  EYES: Eyes grossly normal to inspection, PERRL and conjunctivae and sclerae normal  HENT: ear canals and TM's normal, nose and mouth without ulcers or lesions  NECK: no adenopathy, no asymmetry, masses, or " scars  RESP: lungs clear to auscultation - no rales, rhonchi or wheezes  CV: regular rate and rhythm, normal S1 S2, no S3 or S4, no murmur, click or rub, no peripheral edema  ABDOMEN: soft, nontender, no hepatosplenomegaly, no masses and bowel sounds normal  MS: no gross musculoskeletal defects noted, no edema  SKIN: no suspicious lesions or rashes  NEURO: Normal strength and tone, mentation intact and speech normal  PSYCH: mentation appears normal, affect normal/bright        Signed Electronically by: Froylan Ramirez MD

## 2025-06-30 NOTE — PATIENT INSTRUCTIONS
Patient Education   Preventive Care Advice   This is general advice given by our system to help you stay healthy. However, your care team may have specific advice just for you. Please talk to your care team about your preventive care needs.  Nutrition  Eat 5 or more servings of fruits and vegetables each day.  Try wheat bread, brown rice and whole grain pasta (instead of white bread, rice, and pasta).  Get enough calcium and vitamin D. Check the label on foods and aim for 100% of the RDA (recommended daily allowance).  Lifestyle  Exercise at least 150 minutes each week  (30 minutes a day, 5 days a week).  Do muscle strengthening activities 2 days a week. These help control your weight and prevent disease.  No smoking.  Wear sunscreen to prevent skin cancer.  Have a dental exam and cleaning every 6 months.  Yearly exams  See your health care team every year to talk about:  Any changes in your health.  Any medicines your care team has prescribed.  Preventive care, family planning, and ways to prevent chronic diseases.  Shots (vaccines)   HPV shots (up to age 26), if you've never had them before.  Hepatitis B shots (up to age 59), if you've never had them before.  COVID-19 shot: Get this shot when it's due.  Flu shot: Get a flu shot every year.  Tetanus shot: Get a tetanus shot every 10 years.  Pneumococcal, hepatitis A, and RSV shots: Ask your care team if you need these based on your risk.  Shingles shot (for age 50 and up)  General health tests  Diabetes screening:  Starting at age 35, Get screened for diabetes at least every 3 years.  If you are younger than age 35, ask your care team if you should be screened for diabetes.  Cholesterol test: At age 39, start having a cholesterol test every 5 years, or more often if advised.  Bone density scan (DEXA): At age 50, ask your care team if you should have this scan for osteoporosis (brittle bones).  Hepatitis C: Get tested at least once in your life.  STIs (sexually  transmitted infections)  Before age 24: Ask your care team if you should be screened for STIs.  After age 24: Get screened for STIs if you're at risk. You are at risk for STIs (including HIV) if:  You are sexually active with more than one person.  You don't use condoms every time.  You or a partner was diagnosed with a sexually transmitted infection.  If you are at risk for HIV, ask about PrEP medicine to prevent HIV.  Get tested for HIV at least once in your life, whether you are at risk for HIV or not.  Cancer screening tests  Cervical cancer screening: If you have a cervix, begin getting regular cervical cancer screening tests starting at age 21.  Breast cancer scan (mammogram): If you've ever had breasts, begin having regular mammograms starting at age 40. This is a scan to check for breast cancer.  Colon cancer screening: It is important to start screening for colon cancer at age 45.  Have a colonoscopy test every 10 years (or more often if you're at risk) Or, ask your provider about stool tests like a FIT test every year or Cologuard test every 3 years.  To learn more about your testing options, visit:   .  For help making a decision, visit:   https://bit.ly/vy24516.  Prostate cancer screening test: If you have a prostate, ask your care team if a prostate cancer screening test (PSA) at age 55 is right for you.  Lung cancer screening: If you are a current or former smoker ages 50 to 80, ask your care team if ongoing lung cancer screenings are right for you.  For informational purposes only. Not to replace the advice of your health care provider. Copyright   2023 [facility]. All rights reserved. Clinically reviewed by the [facility]. [employer] 906086 - REV 01/24.  Learning About Stress  What is stress?     Stress is your body's response to a hard situation. Your body can have a physical, emotional, or mental response. Stress is a fact of life for most people, and it  affects everyone differently. What causes stress for you may not be stressful for someone else.  A lot of things can cause stress. You may feel stress when you go on a job interview, take a test, or run a race. This kind of short-term stress is normal and even useful. It can help you if you need to work hard or react quickly. For example, stress can help you finish an important job on time.  Long-term stress is caused by ongoing stressful situations or events. Examples of long-term stress include long-term health problems, ongoing problems at work, or conflicts in your family. Long-term stress can harm your health.  How does stress affect your health?  When you are stressed, your body responds as though you are in danger. It makes hormones that speed up your heart, make you breathe faster, and give you a burst of energy. This is called the fight-or-flight stress response. If the stress is over quickly, your body goes back to normal and no harm is done.  But if stress happens too often or lasts too long, it can have bad effects. Long-term stress can make you more likely to get sick, and it can make symptoms of some diseases worse. If you tense up when you are stressed, you may develop neck, shoulder, or low back pain. Stress is linked to high blood pressure and heart disease.  Stress also harms your emotional health. It can make you ellis, tense, or depressed. Your relationships may suffer, and you may not do well at work or school.  What can you do to manage stress?  You can try these things to help manage stress:   Do something active. Exercise or activity can help reduce stress. Walking is a great way to get started. Even everyday activities such as housecleaning or yard work can help.  Try yoga or mayte chi. These techniques combine exercise and meditation. You may need some training at first to learn them.  Do something you enjoy. For example, listen to music or go to a movie. Practice your hobby or do volunteer  "work.  Meditate. This can help you relax, because you are not worrying about what happened before or what may happen in the future.  Do guided imagery. Imagine yourself in any setting that helps you feel calm. You can use online videos, books, or a teacher to guide you.  Do breathing exercises. For example:  From a standing position, bend forward from the waist with your knees slightly bent. Let your arms dangle close to the floor.  Breathe in slowly and deeply as you return to a standing position. Roll up slowly and lift your head last.  Hold your breath for just a few seconds in the standing position.  Breathe out slowly and bend forward from the waist.  Let your feelings out. Talk, laugh, cry, and express anger when you need to. Talking with supportive friends or family, a counselor, or a adelaide leader about your feelings is a healthy way to relieve stress. Avoid discussing your feelings with people who make you feel worse.  Write. It may help to write about things that are bothering you. This helps you find out how much stress you feel and what is causing it. When you know this, you can find better ways to cope.  What can you do to prevent stress?  You might try some of these things to help prevent stress:  Manage your time. This helps you find time to do the things you want and need to do.  Get enough sleep. Your body recovers from the stresses of the day while you are sleeping.  Get support. Your family, friends, and community can make a difference in how you experience stress.  Limit your news feed. Avoid or limit time on social media or news that may make you feel stressed.  Do something active. Exercise or activity can help reduce stress. Walking is a great way to get started.  Where can you learn more?  Go to https://www.Mamba.net/patiented  Enter N032 in the search box to learn more about \"Learning About Stress.\"  Current as of: October 24, 2024  Content Version: 14.5 2024-2025 [employer].   Care instructions adapted under license by your healthcare professional. If you have questions about a medical condition or this instruction, always ask your healthcare professional. [employer] disclaims any warranty or liability for your use of this information.

## 2025-07-03 ENCOUNTER — RESULTS FOLLOW-UP (OUTPATIENT)
Dept: PEDIATRICS | Facility: CLINIC | Age: 60
End: 2025-07-03

## 2025-07-21 ASSESSMENT — ACTIVITIES OF DAILY LIVING (ADL)
HOW_WOULD_YOU_RATE_YOUR_CURRENT_LEVEL_OF_FUNCTION_DURING_YOUR_SPORTS_RELATED_ACTIVITIES_FROM_0_TO_100_WITH_100_BEING_YOUR_LEVEL_OF_FUNCTION_PRIOR_TO_YOUR_HIP_PROBLEM_AND_0_BEING_THE_INABILITY_TO_PERFORM_ANY_OF_YOUR_USUAL_DAILY_ACTIVITIES?: 35
SITTING_FOR_15_MINUTES: MODERATE DIFFICULTY
SPORTS_HIGHEST_POTENTIAL_SCORE: 36
ADL_SCORE(%): 0
GETTING_INTO_AND_OUT_OF_A_BATHTUB: EXTREME DIFFICULTY
WALKING_15_MINUTES_OR_GREATER: MODERATE DIFFICULTY
WALKING_FOR_APPROXIMATELY_10_MINUTES: SLIGHT DIFFICULTY
STEPPING UP AND DOWN CURBS: NO DIFFICULTY AT ALL
PLEASE_INDICATE_YOR_PRIMARY_REASON_FOR_REFERRAL_TO_THERAPY:: HIP
GETTING INTO AND OUT OF AN AVERAGE CAR: NO DIFFICULTY AT ALL
HOS_ADL_HIGHEST_POTENTIAL_SCORE: 68
HOS_ADL_ITEM_SCORE_TOTAL: 48
RECREATIONAL_ACTIVITIES: MODERATE DIFFICULTY
WALKING_INITIALLY: MODERATE DIFFICULTY
HOS_ADL_SCORE(%): 70.59
GETTING_INTO_AND_OUT_OF_A_BATHTUB: EXTREME DIFFICULTY
WALKING_DOWN_STEEP_HILLS: SLIGHT DIFFICULTY
GOING DOWN 1 FLIGHT OF STAIRS: SLIGHT DIFFICULTY
STANDING FOR 15 MINUTES: NO DIFFICULTY AT ALL
SPORTS_COUNT: 9
WALKING_UP_STEEP_HILLS: NO DIFFICULTY AT ALL
STEPPING_UP_AND_DOWN_CURBS: NO DIFFICULTY AT ALL
SPORTS_TOTAL_ITEM_SCORE: 0
GETTING_INTO_AND_OUT_OF_AN_AVERAGE_CAR: NO DIFFICULTY AT ALL
LIGHT_TO_MODERATE_WORK: SLIGHT DIFFICULTY
TWISTING/PIVOTING ON INVOLVED LEG: SLIGHT DIFFICULTY
HOW_WOULD_YOU_RATE_YOUR_CURRENT_LEVEL_OF_FUNCTION_DURING_YOUR_USUAL_ACTIVITIES_OF_DAILY_LIVING_FROM_0_TO_100_WITH_100_BEING_YOUR_LEVEL_OF_FUNCTION_PRIOR_TO_YOUR_HIP_PROBLEM_AND_0_BEING_THE_INABILITY_TO_PERFORM_ANY_OF_YOUR_USUAL_DAILY_ACTIVITIES?: 35
TWISTING/PIVOTING_ON_INVOLVED_LEG: SLIGHT DIFFICULTY
ADL_TOTAL_ITEM_SCORE: 0
WALKING_15_MINUTES_OR_GREATER: MODERATE DIFFICULTY
WALKING_DOWN_STEEP_HILLS: SLIGHT DIFFICULTY
WALKING_APPROXIMATELY_10_MINUTES: SLIGHT DIFFICULTY
ROLLING OVER IN BED: SLIGHT DIFFICULTY
HOW_WOULD_YOU_RATE_YOUR_CURRENT_LEVEL_OF_FUNCTION_DURING_YOUR_USUAL_ACTIVITIES_OF_DAILY_LIVING_FROM_0_TO_100_WITH_100_BEING_YOUR_LEVEL_OF_FUNCTION_PRIOR_TO_YOUR_HIP_PROBLEM_AND_0_BEING_THE_INABILITY_TO_PERFORM_ANY_OF_YOUR_USUAL_DAILY_ACTIVITIES?: 35
HEAVY_WORK: MODERATE DIFFICULTY
RECREATIONAL ACTIVITIES: MODERATE DIFFICULTY
DEEP SQUATTING: MODERATE DIFFICULTY
ROLLING_OVER_IN_BED: SLIGHT DIFFICULTY
WALKING_INITIALLY: MODERATE DIFFICULTY
GOING_UP_1_FLIGHT_OF_STAIRS: SLIGHT DIFFICULTY
PUTTING_ON_SOCKS_AND_SHOES: NO DIFFICULTY AT ALL
SPORTS_SCORE(%): 0
GOING UP 1 FLIGHT OF STAIRS: SLIGHT DIFFICULTY
PUTTING ON SOCKS AND SHOES: NO DIFFICULTY AT ALL
DEEP_SQUATTING: MODERATE DIFFICULTY
ADL_COUNT: 17
SITTING FOR 15 MINUTES: MODERATE DIFFICULTY
HEAVY_WORK: MODERATE DIFFICULTY
LIGHT_TO_MODERATE_WORK: SLIGHT DIFFICULTY
STANDING_FOR_15_MINUTES: NO DIFFICULTY AT ALL
ADL_HIGHEST_POTENTIAL_SCORE: 68
HOW_WOULD_YOU_RATE_YOUR_CURRENT_LEVEL_OF_FUNCTION?: ABNORMAL
GOING_DOWN_1_FLIGHT_OF_STAIRS: SLIGHT DIFFICULTY
WALKING_UP_STEEP_HILLS: NO DIFFICULTY AT ALL

## 2025-07-23 ENCOUNTER — THERAPY VISIT (OUTPATIENT)
Dept: PHYSICAL THERAPY | Facility: CLINIC | Age: 60
End: 2025-07-23
Attending: INTERNAL MEDICINE
Payer: COMMERCIAL

## 2025-07-23 DIAGNOSIS — M25.552 BILATERAL HIP PAIN: ICD-10-CM

## 2025-07-23 DIAGNOSIS — M25.551 BILATERAL HIP PAIN: ICD-10-CM

## 2025-07-23 PROCEDURE — 97161 PT EVAL LOW COMPLEX 20 MIN: CPT | Mod: GP | Performed by: PHYSICAL THERAPIST

## 2025-07-23 PROCEDURE — 97110 THERAPEUTIC EXERCISES: CPT | Mod: GP | Performed by: PHYSICAL THERAPIST

## 2025-07-23 NOTE — PROGRESS NOTES
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  During that time, both hips have been off/on bothering her.  Pt notes that she has aching/soreness in left hip, and a burning sensation in her L knee.  Pt does a lot of rolling out of her L leg muscles, but feels like she needs it every day and relief is temporary.  Pt also has R hip pain, but that is not currently inflammed.  Pt rates PL 5-6/10 at worst in L knee and hip.  Pain is worst while sitting, or any position of inactivity.  Date of onset: 06/30/25    Relevant medical history: High blood pressure; Migraines or headaches; Neck injury; Pain at night or rest; Sleep disorder like apnea   Dates & types of surgery:      Prior diagnostic imaging/testing results:       Prior therapy history for the same diagnosis, illness or injury: No      Prior Level of Function  Transfers:   Ambulation:   ADL:   IADL:     Living Environment  Social support: With family members   Type of home: House; 2-story   Stairs to enter the home: Yes 12 Is there a railing: No     Ramp: No   Stairs inside the home: Yes 12 Is there a railing: Yes     Help at home: None  Equipment owned:       Employment: Yes /[employer]  Hobbies/Interests: Walking dogs, reading, gardening    Patient goals for therapy: Sleep, sit, stand       Objective   HIP EVALUATION  PAIN: Pain Level at Rest: 4/10  Pain Level with Use: 2/10  Pain Location: lumbar spine and hip  Pain Quality: Aching and Burning  Pain Frequency: intermittent  Pain is Worst: daytime  Pain is Exacerbated By: inactivity, sitting  Pain is Relieved By: use  Pain Progression:  Worsened  INTEGUMENTARY (edema, incisions):   POSTURE: Standing Posture: wnl  GAIT:   Weightbearing Status:   Assistive Device(s):   Gait Deviations:   BALANCE/PROPRIOCEPTION:   WEIGHTBEARING ALIGNMENT:   NON-WEIGHTBEARING ALIGNMENT:    ROM:   (Degrees) Left AROM Left PROM  Right AROM Right PROM   Hip Flexion       Hip Extension       Hip Abduction       Hip Adduction       Hip Internal Rotation Min loss erp  Min loss erp    Hip External Rotation       Knee Flexion       Knee Extension       Lumbar Side glide Min loss Min loss   Lumbar Flexion Min loss   Lumbar Extension Min loss   Pain:   End feel:     PELVIC/SI SCREEN:   STRENGTH: core strength: fair.  L hip abd 4+/5, ext 4+/5.  R hip ext 4+/5, abd 4+/5  LE FLEXIBILITY:   SPECIAL TESTS:   FUNCTIONAL TESTS:   PALPATION: hypertonic lumbar paraspinals      Assessment & Plan   CLINICAL IMPRESSIONS  Medical Diagnosis: Bilateral hip pain    Treatment Diagnosis: B hip pain   Impression/Assessment: Patient is a 59 year old female with B hip/low back complaints.  The following significant findings have been identified: Pain, Decreased ROM/flexibility, Decreased joint mobility, Decreased strength, Impaired sensation, Inflammation, Impaired gait, Impaired muscle performance, Decreased activity tolerance, and Impaired posture. These impairments interfere with their ability to perform self care tasks, work tasks, recreational activities, household chores, driving , household mobility, and community mobility as compared to previous level of function.     Clinical Decision Making (Complexity):  Clinical Presentation: Stable/Uncomplicated  Clinical Presentation Rationale: based on medical and personal factors listed in PT evaluation  Clinical Decision Making (Complexity): Low complexity    PLAN OF CARE  Treatment Interventions:  Interventions: Gait Training, Manual Therapy, Neuromuscular Re-education, Therapeutic Activity, Therapeutic Exercise, Self-Care/Home Management    Long  Term Goals     PT Goal 1  Goal Identifier: sitting  Goal Description: Pt will be able to sit 2 hour pain free  Rationale: to maximize safety and independence with performance of ADLs and functional tasks  Target Date: 09/17/25      Frequency of Treatment: 1x/week  Duration of Treatment: 8 weeks    Recommended Referrals to Other Professionals:   Education Assessment:   Learner/Method: Patient;Listening;Reading;Demonstration;Pictures/Video  Education Comments: Pt instructed on findings of evaluation, expectations regarding frequency/intensity of HEP.    Risks and benefits of evaluation/treatment have been explained.   Patient/Family/caregiver agrees with Plan of Care.     Evaluation Time:     PT Eval, Low Complexity Minutes (47362): 15       Signing Clinician: Vinod Decker PT

## 2025-07-30 ENCOUNTER — THERAPY VISIT (OUTPATIENT)
Dept: PHYSICAL THERAPY | Facility: CLINIC | Age: 60
End: 2025-07-30
Attending: INTERNAL MEDICINE
Payer: COMMERCIAL

## 2025-07-30 DIAGNOSIS — M25.551 BILATERAL HIP PAIN: Primary | ICD-10-CM

## 2025-07-30 DIAGNOSIS — M25.552 BILATERAL HIP PAIN: Primary | ICD-10-CM

## 2025-07-30 PROCEDURE — 97110 THERAPEUTIC EXERCISES: CPT | Mod: GP | Performed by: PHYSICAL THERAPIST
